# Patient Record
Sex: FEMALE | Race: WHITE | NOT HISPANIC OR LATINO
[De-identification: names, ages, dates, MRNs, and addresses within clinical notes are randomized per-mention and may not be internally consistent; named-entity substitution may affect disease eponyms.]

---

## 2018-12-03 ENCOUNTER — APPOINTMENT (OUTPATIENT)
Dept: INTERNAL MEDICINE | Facility: CLINIC | Age: 73
End: 2018-12-03
Payer: MEDICARE

## 2018-12-03 DIAGNOSIS — Z87.19 PERSONAL HISTORY OF OTHER DISEASES OF THE DIGESTIVE SYSTEM: ICD-10-CM

## 2018-12-03 DIAGNOSIS — Z87.39 PERSONAL HISTORY OF OTHER DISEASES OF THE MUSCULOSKELETAL SYSTEM AND CONNECTIVE TISSUE: ICD-10-CM

## 2018-12-03 DIAGNOSIS — Z80.3 FAMILY HISTORY OF MALIGNANT NEOPLASM OF BREAST: ICD-10-CM

## 2018-12-03 DIAGNOSIS — Z85.6 PERSONAL HISTORY OF LEUKEMIA: ICD-10-CM

## 2018-12-03 DIAGNOSIS — F31.70 BIPOLAR DISORDER, CURRENTLY IN REMISSION, MOST RECENT EPISODE UNSPECIFIED: ICD-10-CM

## 2018-12-03 PROCEDURE — 99204 OFFICE O/P NEW MOD 45 MIN: CPT

## 2018-12-03 NOTE — HEALTH RISK ASSESSMENT
[No falls in past year] : Patient reported no falls in the past year [0] : 2) Feeling down, depressed, or hopeless: Not at all (0) [] : Yes

## 2018-12-04 ENCOUNTER — RX RENEWAL (OUTPATIENT)
Age: 73
End: 2018-12-04

## 2018-12-17 ENCOUNTER — TRANSCRIPTION ENCOUNTER (OUTPATIENT)
Age: 73
End: 2018-12-17

## 2018-12-18 PROBLEM — F31.70 HISTORY OF DEPRESSED BIPOLAR DISORDER: Status: RESOLVED | Noted: 2018-12-03 | Resolved: 2018-12-18

## 2018-12-18 PROBLEM — Z87.19 HISTORY OF GASTROESOPHAGEAL REFLUX (GERD): Status: RESOLVED | Noted: 2018-12-03 | Resolved: 2018-12-18

## 2018-12-18 PROBLEM — Z80.3 FAMILY HISTORY OF MALIGNANT NEOPLASM OF FEMALE BREAST: Status: ACTIVE | Noted: 2018-12-03

## 2018-12-18 PROBLEM — Z87.19 HISTORY OF IRRITABLE BOWEL SYNDROME: Status: RESOLVED | Noted: 2018-12-03 | Resolved: 2018-12-18

## 2018-12-18 PROBLEM — Z85.6: Status: RESOLVED | Noted: 2018-12-03 | Resolved: 2018-12-18

## 2018-12-18 PROBLEM — Z87.39 HISTORY OF ARTHRITIS: Status: RESOLVED | Noted: 2018-12-03 | Resolved: 2018-12-18

## 2018-12-18 NOTE — PLAN
[FreeTextEntry1] : 1. spoke to patient for a long time. Ativan 4mg 4 times a day is not something i am willing to continue. She will have to find a psychiatrist for medication management. \par 2. cold virus , no intervention for now

## 2019-01-03 ENCOUNTER — RX RENEWAL (OUTPATIENT)
Age: 74
End: 2019-01-03

## 2019-01-03 RX ORDER — QUETIAPINE FUMARATE 100 MG/1
100 TABLET ORAL AT BEDTIME
Qty: 30 | Refills: 0 | Status: ACTIVE | COMMUNITY
Start: 2018-12-03 | End: 1900-01-01

## 2019-01-04 ENCOUNTER — RX RENEWAL (OUTPATIENT)
Age: 74
End: 2019-01-04

## 2019-01-04 ENCOUNTER — LABORATORY RESULT (OUTPATIENT)
Age: 74
End: 2019-01-04

## 2019-01-04 ENCOUNTER — APPOINTMENT (OUTPATIENT)
Dept: INTERNAL MEDICINE | Facility: CLINIC | Age: 74
End: 2019-01-04
Payer: MEDICARE

## 2019-01-04 ENCOUNTER — NON-APPOINTMENT (OUTPATIENT)
Age: 74
End: 2019-01-04

## 2019-01-04 VITALS
DIASTOLIC BLOOD PRESSURE: 72 MMHG | OXYGEN SATURATION: 98 % | BODY MASS INDEX: 8.64 KG/M2 | HEART RATE: 76 BPM | HEIGHT: 60 IN | SYSTOLIC BLOOD PRESSURE: 108 MMHG | WEIGHT: 44 LBS | TEMPERATURE: 98.2 F

## 2019-01-04 PROCEDURE — 93000 ELECTROCARDIOGRAM COMPLETE: CPT

## 2019-01-04 PROCEDURE — 36415 COLL VENOUS BLD VENIPUNCTURE: CPT

## 2019-01-04 PROCEDURE — G0439: CPT

## 2019-01-04 NOTE — HISTORY OF PRESENT ILLNESS
[FreeTextEntry1] : physical  [de-identified] : 1. patient is here for physical exam. \par 2 currently is  following a psychiatry and is on medication and getting therapy. \par 3. diagnosis of CLL and being monitored by hematology

## 2019-01-04 NOTE — HEALTH RISK ASSESSMENT
[Two or more falls in past year] : Patient reported two or more falls in the past year [0] : 2) Feeling down, depressed, or hopeless: Not at all (0) [Good] : ~his/her~  mood as  good [] : No [de-identified] : patient stopped smoking a 3 months ago [Change in mental status noted] : No change in mental status noted [Language] : denies difficulty with language [Behavior] : denies difficulty with behavior [Learning/Retaining New Information] : denies difficulty learning/retaining new information [Handling Complex Tasks] : denies difficulty handling complex tasks [Reasoning] : denies difficulty with reasoning [Spatial Ability and Orientation] : denies difficulty with spatial ability and orientation [Reports changes in hearing] : Reports no changes in hearing [Reports changes in vision] : Reports no changes in vision [Reports changes in dental health] : Reports no changes in dental health [Smoke Detector] : no smoke detector [Safety elements used in home] : no safety elements used in home [Seat Belt] : does not use seat belt [TB Exposure] : is not being exposed to tuberculosis [MammogramDate] : 2017 [PapSmearDate] : 2015 [BoneDensityDate] : 2015 [ColonoscopyDate] : many years

## 2019-01-08 LAB
25(OH)D3 SERPL-MCNC: 27.7 NG/ML
ALBUMIN SERPL ELPH-MCNC: 4.1 G/DL
ALP BLD-CCNC: 71 U/L
ALT SERPL-CCNC: 12 U/L
ANION GAP SERPL CALC-SCNC: 12 MMOL/L
AST SERPL-CCNC: 18 U/L
BASOPHILS # BLD AUTO: 0.18 K/UL
BASOPHILS NFR BLD AUTO: 0.2 %
BILIRUB SERPL-MCNC: 0.5 MG/DL
BUN SERPL-MCNC: 22 MG/DL
CALCIUM SERPL-MCNC: 9.1 MG/DL
CHLORIDE SERPL-SCNC: 105 MMOL/L
CHOLEST SERPL-MCNC: 248 MG/DL
CHOLEST/HDLC SERPL: 4.2 RATIO
CO2 SERPL-SCNC: 27 MMOL/L
CREAT SERPL-MCNC: 0.77 MG/DL
EOSINOPHIL # BLD AUTO: 0.29 K/UL
EOSINOPHIL NFR BLD AUTO: 0.4 %
GLUCOSE SERPL-MCNC: 77 MG/DL
HBA1C MFR BLD HPLC: 6.1 %
HCT VFR BLD CALC: 35.9 %
HCV AB SER QL: ABNORMAL
HCV S/CO RATIO: 1.4 S/CO
HDLC SERPL-MCNC: 59 MG/DL
HGB BLD-MCNC: 11.1 G/DL
HIV1+2 AB SPEC QL IA.RAPID: NONREACTIVE
IMM GRANULOCYTES NFR BLD AUTO: 0.1 %
LDLC SERPL CALC-MCNC: 160 MG/DL
LYMPHOCYTES # BLD AUTO: 77.47 K/UL
LYMPHOCYTES NFR BLD AUTO: 94.9 %
MAN DIFF?: NORMAL
MCHC RBC-ENTMCNC: 30.9 GM/DL
MCHC RBC-ENTMCNC: 31.6 PG
MCV RBC AUTO: 102.3 FL
MONOCYTES # BLD AUTO: 0.66 K/UL
MONOCYTES NFR BLD AUTO: 0.8 %
NEUTROPHILS # BLD AUTO: 2.91 K/UL
NEUTROPHILS NFR BLD AUTO: 3.6 %
PLATELET # BLD AUTO: 139 K/UL
POTASSIUM SERPL-SCNC: 4.2 MMOL/L
PROT SERPL-MCNC: 6.1 G/DL
RBC # BLD: 3.51 M/UL
RBC # FLD: 14.7 %
SODIUM SERPL-SCNC: 144 MMOL/L
T4 SERPL-MCNC: 6.7 UG/DL
TRIGL SERPL-MCNC: 145 MG/DL
TSH SERPL-ACNC: 7.16 UIU/ML
VIT B12 SERPL-MCNC: 443 PG/ML
WBC # FLD AUTO: 81.61 K/UL

## 2019-04-03 ENCOUNTER — TRANSCRIPTION ENCOUNTER (OUTPATIENT)
Age: 74
End: 2019-04-03

## 2019-04-10 ENCOUNTER — LABORATORY RESULT (OUTPATIENT)
Age: 74
End: 2019-04-10

## 2019-04-10 ENCOUNTER — APPOINTMENT (OUTPATIENT)
Dept: INTERNAL MEDICINE | Facility: CLINIC | Age: 74
End: 2019-04-10
Payer: MEDICARE

## 2019-04-10 VITALS
SYSTOLIC BLOOD PRESSURE: 120 MMHG | DIASTOLIC BLOOD PRESSURE: 80 MMHG | WEIGHT: 146 LBS | OXYGEN SATURATION: 98 % | HEIGHT: 64 IN | BODY MASS INDEX: 24.92 KG/M2 | HEART RATE: 76 BPM

## 2019-04-10 DIAGNOSIS — R35.0 FREQUENCY OF MICTURITION: ICD-10-CM

## 2019-04-10 DIAGNOSIS — R22.1 LOCALIZED SWELLING, MASS AND LUMP, NECK: ICD-10-CM

## 2019-04-10 PROCEDURE — 99214 OFFICE O/P EST MOD 30 MIN: CPT

## 2019-04-10 NOTE — PHYSICAL EXAM
[No Acute Distress] : no acute distress [Well Developed] : well developed [Well Nourished] : well nourished [Well-Appearing] : well-appearing [Normal Sclera/Conjunctiva] : normal sclera/conjunctiva [PERRL] : pupils equal round and reactive to light [EOMI] : extraocular movements intact [Normal Oropharynx] : the oropharynx was normal [Normal Outer Ear/Nose] : the outer ears and nose were normal in appearance [No Lymphadenopathy] : no lymphadenopathy [No JVD] : no jugular venous distention [Supple] : supple [Thyroid Normal, No Nodules] : the thyroid was normal and there were no nodules present [Clear to Auscultation] : lungs were clear to auscultation bilaterally [No Respiratory Distress] : no respiratory distress  [No Accessory Muscle Use] : no accessory muscle use [Normal Rate] : normal rate  [Regular Rhythm] : with a regular rhythm [Normal S1, S2] : normal S1 and S2 [No Murmur] : no murmur heard [No Carotid Bruits] : no carotid bruits [No Varicosities] : no varicosities [No Abdominal Bruit] : a ~M bruit was not heard ~T in the abdomen [Pedal Pulses Present] : the pedal pulses are present [No Edema] : there was no peripheral edema [No Extremity Clubbing/Cyanosis] : no extremity clubbing/cyanosis [Soft] : abdomen soft [No Palpable Aorta] : no palpable aorta [Non-distended] : non-distended [Non Tender] : non-tender [No Masses] : no abdominal mass palpated [Normal Bowel Sounds] : normal bowel sounds [No HSM] : no HSM [Normal Posterior Cervical Nodes] : no posterior cervical lymphadenopathy [Normal Anterior Cervical Nodes] : no anterior cervical lymphadenopathy [No CVA Tenderness] : no CVA  tenderness [No Spinal Tenderness] : no spinal tenderness [No Joint Swelling] : no joint swelling [No Rash] : no rash [Grossly Normal Strength/Tone] : grossly normal strength/tone [Normal Gait] : normal gait [Coordination Grossly Intact] : coordination grossly intact [No Focal Deficits] : no focal deficits [Deep Tendon Reflexes (DTR)] : deep tendon reflexes were 2+ and symmetric [Normal Affect] : the affect was normal [Normal Insight/Judgement] : insight and judgment were intact

## 2019-04-11 NOTE — HISTORY OF PRESENT ILLNESS
[FreeTextEntry8] : 1. patient reports she is always tired. She does not have the energy to do basic house work. \par 2. she would like to see a hematologist in the area for management of her CLL\par 3. She has frequent urination and would like to be evaluated by urology.

## 2019-04-15 LAB
APPEARANCE: CLEAR
BILIRUBIN URINE: NEGATIVE
BLOOD URINE: ABNORMAL
COLOR: YELLOW
GLUCOSE QUALITATIVE U: NEGATIVE
KETONES URINE: NEGATIVE
LEUKOCYTE ESTERASE URINE: ABNORMAL
NITRITE URINE: NEGATIVE
PH URINE: 5
PROTEIN URINE: NORMAL
SPECIFIC GRAVITY URINE: 1.02
UROBILINOGEN URINE: NORMAL

## 2019-04-19 ENCOUNTER — APPOINTMENT (OUTPATIENT)
Dept: HEMATOLOGY ONCOLOGY | Facility: CLINIC | Age: 74
End: 2019-04-19
Payer: MEDICARE

## 2019-05-06 ENCOUNTER — RESULT REVIEW (OUTPATIENT)
Age: 74
End: 2019-05-06

## 2019-05-06 ENCOUNTER — APPOINTMENT (OUTPATIENT)
Dept: HEMATOLOGY ONCOLOGY | Facility: CLINIC | Age: 74
End: 2019-05-06
Payer: MEDICARE

## 2019-05-06 VITALS
HEART RATE: 72 BPM | TEMPERATURE: 98.2 F | DIASTOLIC BLOOD PRESSURE: 74 MMHG | WEIGHT: 146 LBS | HEIGHT: 60.24 IN | RESPIRATION RATE: 20 BRPM | OXYGEN SATURATION: 97 % | SYSTOLIC BLOOD PRESSURE: 129 MMHG | BODY MASS INDEX: 28.29 KG/M2

## 2019-05-06 DIAGNOSIS — Z80.8 FAMILY HISTORY OF MALIGNANT NEOPLASM OF OTHER ORGANS OR SYSTEMS: ICD-10-CM

## 2019-05-06 DIAGNOSIS — M19.90 UNSPECIFIED OSTEOARTHRITIS, UNSPECIFIED SITE: ICD-10-CM

## 2019-05-06 PROCEDURE — 99205 OFFICE O/P NEW HI 60 MIN: CPT

## 2019-05-06 RX ORDER — CLONAZEPAM 1 MG/1
1 TABLET ORAL
Refills: 0 | Status: ACTIVE | COMMUNITY

## 2019-05-06 NOTE — ASSESSMENT
[FreeTextEntry1] : CLL \par Diagnosed in 2012\par Peripheral blood FISH showed 13q del\par Previously cared for by Dr Selina Carver ([facility])\par CT 1/2017 without any lymphadenopathy\par Mcintyre stage 0, low risk\par \par Co fatigue\par Has mild anemia Hgb 11.1, platelets 139 per labs 1/2019\par Repeat blood work including anemia panel\par DEXA scan given her reported ho osteoporosis\par \par Follow up in a few weeks to review findings\par May need imaging CT/PET \par NO repeat labs on follow up

## 2019-05-06 NOTE — HISTORY OF PRESENT ILLNESS
[de-identified] : 73 year old female presents today for CLL, referred by Dr. Jim.  \par \par Around 2012- She was not feeling well, weakness, fatigue, frequent infections- saw her PCP- who did blood work which showed elevated WBC- was referred to hematologist (Dr Selina Carver) - who diagnosed with CLL- she has been following with her since. Has intermittently had issues, which were not satisfactorily answered. \par She was last seen 4-5 years back. SHe saw Dr Monet another oncologist in the same practice in 2016 who did the FISH panel which showed del 13 q\par SHe decided to switch care and is here to establish care\par \par She continues to be tired. Has to sit after minimal activity\par She does not sleep very well - due to neuropathy - has had tingling numbness, burning bl feet x 3 years\par \par No fevers chills night sweats\par No fatigue, tiredness, apathy\par No appetite loss or weight loss/weight gain\par No chest pain, shortness of breath, palpitation, dizziness\par No abdominal pain, nausea, vomiting, diarrhea, constipation\par No bright red blood per rectum, hematemesis or melena\par No hematuria, dysuria, frequency, urgency\par No headaches, visual changes, focal weakness, tingling, numbness\par

## 2019-05-13 ENCOUNTER — RESULT REVIEW (OUTPATIENT)
Age: 74
End: 2019-05-13

## 2019-05-20 ENCOUNTER — APPOINTMENT (OUTPATIENT)
Dept: THORACIC SURGERY | Facility: CLINIC | Age: 74
End: 2019-05-20
Payer: MEDICARE

## 2019-05-20 PROCEDURE — 99205 OFFICE O/P NEW HI 60 MIN: CPT

## 2019-05-21 ENCOUNTER — APPOINTMENT (OUTPATIENT)
Dept: GASTROENTEROLOGY | Facility: CLINIC | Age: 74
End: 2019-05-21
Payer: MEDICARE

## 2019-05-21 VITALS
BODY MASS INDEX: 26.87 KG/M2 | DIASTOLIC BLOOD PRESSURE: 69 MMHG | SYSTOLIC BLOOD PRESSURE: 126 MMHG | HEIGHT: 62 IN | HEART RATE: 71 BPM | WEIGHT: 146 LBS

## 2019-05-21 PROCEDURE — 99203 OFFICE O/P NEW LOW 30 MIN: CPT

## 2019-05-21 RX ORDER — CLONAZEPAM 0.5 MG/1
0.5 TABLET ORAL
Qty: 60 | Refills: 0 | Status: DISCONTINUED | COMMUNITY
Start: 2018-12-04 | End: 2019-05-21

## 2019-05-21 RX ORDER — NITROFURANTOIN (MONOHYDRATE/MACROCRYSTALS) 25; 75 MG/1; MG/1
100 CAPSULE ORAL
Qty: 10 | Refills: 0 | Status: DISCONTINUED | COMMUNITY
Start: 2019-04-15 | End: 2019-05-21

## 2019-05-21 RX ORDER — QUETIAPINE 200 MG/1
200 TABLET, FILM COATED ORAL
Refills: 0 | Status: DISCONTINUED | COMMUNITY
End: 2019-05-21

## 2019-05-22 ENCOUNTER — RESULT REVIEW (OUTPATIENT)
Age: 74
End: 2019-05-22

## 2019-05-22 NOTE — HISTORY OF PRESENT ILLNESS
[FreeTextEntry1] : This is a 72 y/o F with pmhx of bipolar d/o, CLL, hiatal hernia (prior fundoplication) with GERD & Meza's Esophagitis who was referred to our office for surgical consultation for repair of recurrent hiatal hernia.  \par \par She states that she has had GERD symptoms for many years.  Her last endoscopy (approx 4 years ago) revealed recurrent hiatal hernia.  She states that she has taken many medications with very little symptomatic relief.  She sleeps with multiple pillows at night.  \par \par She admits to dpigastric pain, difficult and painful swallowing, choking on food at times.  She denies weight loss, nausea, vomiting.

## 2019-05-22 NOTE — PHYSICAL EXAM
[General Appearance - Alert] : alert [General Appearance - In No Acute Distress] : in no acute distress [Sclera] : the sclera and conjunctiva were normal [PERRL With Normal Accommodation] : pupils were equal in size, round, and reactive to light [Extraocular Movements] : extraocular movements were intact [Outer Ear] : the ears and nose were normal in appearance [Oropharynx] : the oropharynx was normal [Neck Appearance] : the appearance of the neck was normal [Neck Cervical Mass (___cm)] : no neck mass was observed [Jugular Venous Distention Increased] : there was no jugular-venous distention [Thyroid Diffuse Enlargement] : the thyroid was not enlarged [Thyroid Nodule] : there were no palpable thyroid nodules [Auscultation Breath Sounds / Voice Sounds] : lungs were clear to auscultation bilaterally [Heart Rate And Rhythm] : heart rate was normal and rhythm regular [Heart Sounds] : normal S1 and S2 [Heart Sounds Gallop] : no gallops [Murmurs] : no murmurs [Heart Sounds Pericardial Friction Rub] : no pericardial rub [Examination Of The Chest] : the chest was normal in appearance [Chest Visual Inspection Thoracic Asymmetry] : no chest asymmetry [Diminished Respiratory Excursion] : normal chest expansion [Bowel Sounds] : normal bowel sounds [Abdomen Soft] : soft [Abdomen Tenderness] : non-tender [Abdomen Mass (___ Cm)] : no abdominal mass palpated [Cervical Lymph Nodes Enlarged Posterior Bilaterally] : posterior cervical [Cervical Lymph Nodes Enlarged Anterior Bilaterally] : anterior cervical [Supraclavicular Lymph Nodes Enlarged Bilaterally] : supraclavicular [Axillary Lymph Nodes Enlarged Bilaterally] : axillary [Femoral Lymph Nodes Enlarged Bilaterally] : femoral [Inguinal Lymph Nodes Enlarged Bilaterally] : inguinal [No CVA Tenderness] : no ~M costovertebral angle tenderness [No Spinal Tenderness] : no spinal tenderness [Abnormal Walk] : normal gait [Nail Clubbing] : no clubbing  or cyanosis of the fingernails [Musculoskeletal - Swelling] : no joint swelling seen [Motor Tone] : muscle strength and tone were normal [Skin Color & Pigmentation] : normal skin color and pigmentation [Skin Turgor] : normal skin turgor [] : no rash [Deep Tendon Reflexes (DTR)] : deep tendon reflexes were 2+ and symmetric [Sensation] : the sensory exam was normal to light touch and pinprick [No Focal Deficits] : no focal deficits [Oriented To Time, Place, And Person] : oriented to person, place, and time [Impaired Insight] : insight and judgment were intact [Affect] : the affect was normal [FreeTextEntry1] : multiple 1 - 2 cm well healed scar from prior laparoscopic fundoplication

## 2019-05-22 NOTE — DATA REVIEWED
[No studies available for review at this time.] : No studies available for review at this time. [FreeTextEntry1] : N

## 2019-05-22 NOTE — ASSESSMENT
[FreeTextEntry1] : This is a 74 y/o F with hiatal hernia (s/p fundoplication in 2012) and refractory GERD symptoms.  She was told many years ago that her fundoplication had failed and she had a recurrent hiatal hernia.  Since then she has tried multiple medications without relief. She may a good candidate for robotic assisted reoperative hiatal hernia repair.  Prior to scheduling her for surgery we will need to obtain more accurate and up to date studies.  At this time she has been instructed to undergo CT chest, barium swallow and endoscopy to determine size of hiatal hernia.  She will return to our office with the results and we will reevaluate her for surgical repair.

## 2019-05-22 NOTE — CONSULT LETTER
[Dear  ___] : Dear  [unfilled], [Courtesy Letter:] : I had the pleasure of seeing your patient, [unfilled], in my office today. [Please see my note below.] : Please see my note below. [Consult Closing:] : Thank you very much for allowing me to participate in the care of this patient.  If you have any questions, please do not hesitate to contact me. [Sincerely,] : Sincerely, [FreeTextEntry3] : Mookie Monroy MD\par Thoracic Surgery\par [facility]

## 2019-05-22 NOTE — HISTORY OF PRESENT ILLNESS
[FreeTextEntry1] : this patient, in her first visit, has presented for several issues...\par 1.  mmain issue is gerd, patient has a history of a fundoplication operation,  laporoscopically, dr Roman, at [facility]., \par \par patient continued to have reflux...\par \par a mesh was placed..and a hernia was discovered at the time of the surgery..\par \par appar a ventral hernia.\par \par patient continued to need ppi treatment by dr Neal, GI at [facility], and was treated with several PPI drugs, including...nexium and one other \par they were unsuccessful...SHe continued to get regurgitation, and acid reflux,or heartburn along with the regurgitation.\par \par uses TUms, which helps..the most.\par \par it was recomm that the PPI meds be continued, but they seemed to have relativeluy little if any efect on the symptoms\par \par after the surgery, patient was later on seen at [facility].. and another surgical consult was advised..as a repair of the original surgery..\par \par patient decided against the surgery, partly because she might require an open procedure.\par \par but patient had already been dx with Leukemia [CLL], so it was decided not to do the Surgery.\par \par patient has continued to have heartburn almost every day or daily..\par It doesn’t seem to matter what she has to eat..\par \par has to avoid onions, red sauce, pepper, etc.\par there is also odynophagiqa\par \par even swallowing water can be painful..\par and for the last two or three years it is painful just to swallow food.\par \par sometimes there is dysphagia.\par \par for solids.\par \par episode of dysphagia swallowing a cracker.\par \par patient's previous md had treated patient with Nexium for a no of years, and antacids\par but she has not had endoscopy or EGD for about seven years, or six to seven years.\par \par patient has not been on therapy for her CLL\par \par the patient ihas sx of IBS..\par last colonoscopy over seven years ago\par \par she has altern constipation and diarrhea..\par and she gets bloated..\par \par patient has lost a considerable amount of weight voluntarily over the last four years, from 240 to 146 pounds.\par \par there is no cardio vascular disease disease, no exertional chest pain or angina\par \par not a smoker\par has five children three daughters and two sons\par \par no melena, no blood in bowlel movement

## 2019-05-22 NOTE — ASSESSMENT
[FreeTextEntry1] : patient has considerable ongoing reflux and regurgitation, occas dysphagia\par plan is to perform egd in assessing her for re operation\par \par More than 50% of the face to face time was devoted to counseling and /or coordination of care.  THis coordination of care may have included reviewing other medical notes and reports, and communicating with other health professionals\par

## 2019-05-22 NOTE — REVIEW OF SYSTEMS
[Abdominal Pain] : abdominal pain [Vomiting] : vomiting [Heartburn] : heartburn [Negative] : Psychiatric [Constipation] : no constipation [Diarrhea] : no diarrhea [Melena] : no melena

## 2019-05-23 ENCOUNTER — APPOINTMENT (OUTPATIENT)
Dept: GASTROENTEROLOGY | Facility: HOSPITAL | Age: 74
End: 2019-05-23

## 2019-05-28 ENCOUNTER — APPOINTMENT (OUTPATIENT)
Dept: HEMATOLOGY ONCOLOGY | Facility: CLINIC | Age: 74
End: 2019-05-28
Payer: MEDICARE

## 2019-05-28 VITALS
DIASTOLIC BLOOD PRESSURE: 75 MMHG | HEART RATE: 78 BPM | TEMPERATURE: 97.3 F | OXYGEN SATURATION: 99 % | HEIGHT: 62.01 IN | RESPIRATION RATE: 20 BRPM | SYSTOLIC BLOOD PRESSURE: 122 MMHG | WEIGHT: 148 LBS | BODY MASS INDEX: 26.89 KG/M2

## 2019-05-28 PROCEDURE — 99214 OFFICE O/P EST MOD 30 MIN: CPT

## 2019-05-28 NOTE — HISTORY OF PRESENT ILLNESS
[de-identified] : 73 year old female presents today for CLL, referred by Dr. Jim.  \par \par Around 2012- She was not feeling well, weakness, fatigue, frequent infections- saw her PCP- who did blood work which showed elevated WBC- was referred to hematologist (Dr Selina Carver) - who diagnosed with CLL- she has been following with her since. Has intermittently had issues, which were not satisfactorily answered. \par She was last seen 4-5 years back. SHe saw Dr Monet another oncologist in the same practice in 2016 who did the FISH panel which showed del 13 q\par SHe decided to switch care and is here to establish care\par \par She continues to be tired. Has to sit after minimal activity\par She does not sleep very well - due to neuropathy - has had tingling numbness, burning bl feet x 3 years\par \par No fevers chills night sweats\par No fatigue, tiredness, apathy\par No appetite loss or weight loss/weight gain\par No chest pain, shortness of breath, palpitation, dizziness\par No abdominal pain, nausea, vomiting, diarrhea, constipation\par No bright red blood per rectum, hematemesis or melena\par No hematuria, dysuria, frequency, urgency\par No headaches, visual changes, focal weakness, tingling, numbness\par   [de-identified] : She is seen today for follow up\par \par s/p IV injectafer on 5/21/19; next dose today\par \par She feels dramatic improvement in her symptoms\par She has more energy, is more awake\par \par She saw Dr Austen Haley (GI) -scheduled for CT, \par Had EGD (5/19/19)- FINAL ENDOSCOPIC DIAGNOSES:\par 1.  Erosive esophagitis with grade A erosions x2 in a patient with a previous fundoplication surgery but intractable symptoms of reflux and regurgitation and occasional dysphagia.\par 2.  Patchy granularity in the descending duodenum, of uncertain significance.

## 2019-05-28 NOTE — CONSULT LETTER
[Dear  ___] : Dear  [unfilled], [Please see my note below.] : Please see my note below. [Consult Closing:] : Thank you very much for allowing me to participate in the care of this patient.  If you have any questions, please do not hesitate to contact me. [Sincerely,] : Sincerely, [Courtesy Letter:] : I had the pleasure of seeing your patient, [unfilled], in my office today. [FreeTextEntry3] : Ron Bennett MD, MPH\par Attending Physician\par Hematology Oncology\par [facility]\par [facility]\par  [DrCamilo  ___] : Dr. KIMBROUGH

## 2019-05-28 NOTE — ASSESSMENT
[FreeTextEntry1] : CLL \par Diagnosed in 2012\par Peripheral blood FISH showed 13q del\par Previously cared for by Dr Selina Carver ([facility])\par CT 1/2017 without any lymphadenopathy\par Mcintyre stage 0, low risk\par \par Had fatigue and mild anemia Hgb 11.1\par Findings suggested ALEXIS- dramatic improvement in symptoms with IV iron\par Followed up with Dr Austen godinez and getting work up\par Close observation of counts for now\par \par DEXA scan shows osteopenia\par \par Follow up in 6 weeks. CBC, CMP, ferritin\par

## 2019-05-29 ENCOUNTER — RESULT REVIEW (OUTPATIENT)
Age: 74
End: 2019-05-29

## 2019-05-30 ENCOUNTER — RESULT REVIEW (OUTPATIENT)
Age: 74
End: 2019-05-30

## 2019-06-06 ENCOUNTER — RX CHANGE (OUTPATIENT)
Age: 74
End: 2019-06-06

## 2019-06-10 ENCOUNTER — APPOINTMENT (OUTPATIENT)
Dept: THORACIC SURGERY | Facility: CLINIC | Age: 74
End: 2019-06-10
Payer: MEDICARE

## 2019-06-16 ENCOUNTER — RX CHANGE (OUTPATIENT)
Age: 74
End: 2019-06-16

## 2019-06-19 ENCOUNTER — MEDICATION RENEWAL (OUTPATIENT)
Age: 74
End: 2019-06-19

## 2019-06-24 ENCOUNTER — RESULT REVIEW (OUTPATIENT)
Age: 74
End: 2019-06-24

## 2019-06-24 ENCOUNTER — APPOINTMENT (OUTPATIENT)
Dept: HEMATOLOGY ONCOLOGY | Facility: CLINIC | Age: 74
End: 2019-06-24
Payer: MEDICARE

## 2019-06-24 ENCOUNTER — APPOINTMENT (OUTPATIENT)
Dept: THORACIC SURGERY | Facility: CLINIC | Age: 74
End: 2019-06-24
Payer: MEDICARE

## 2019-06-24 VITALS
RESPIRATION RATE: 18 BRPM | BODY MASS INDEX: 26.17 KG/M2 | SYSTOLIC BLOOD PRESSURE: 132 MMHG | TEMPERATURE: 98.1 F | HEIGHT: 62.01 IN | OXYGEN SATURATION: 97 % | DIASTOLIC BLOOD PRESSURE: 81 MMHG | WEIGHT: 144 LBS | HEART RATE: 79 BPM

## 2019-06-24 DIAGNOSIS — B96.81 GASTRITIS, UNSPECIFIED, W/OUT BLEEDING: ICD-10-CM

## 2019-06-24 DIAGNOSIS — K29.70 GASTRITIS, UNSPECIFIED, W/OUT BLEEDING: ICD-10-CM

## 2019-06-24 PROCEDURE — 99214 OFFICE O/P EST MOD 30 MIN: CPT

## 2019-06-24 PROCEDURE — 99215 OFFICE O/P EST HI 40 MIN: CPT

## 2019-06-24 NOTE — PHYSICAL EXAM
[Sclera] : the sclera and conjunctiva were normal [PERRL With Normal Accommodation] : pupils were equal in size, round, and reactive to light [Extraocular Movements] : extraocular movements were intact [Neck Cervical Mass (___cm)] : no neck mass was observed [Neck Appearance] : the appearance of the neck was normal [Thyroid Nodule] : there were no palpable thyroid nodules [Jugular Venous Distention Increased] : there was no jugular-venous distention [Thyroid Diffuse Enlargement] : the thyroid was not enlarged [Heart Rate And Rhythm] : heart rate was normal and rhythm regular [Auscultation Breath Sounds / Voice Sounds] : lungs were clear to auscultation bilaterally [Heart Sounds Gallop] : no gallops [Heart Sounds] : normal S1 and S2 [Murmurs] : no murmurs [Heart Sounds Pericardial Friction Rub] : no pericardial rub [Examination Of The Chest] : the chest was normal in appearance [Chest Visual Inspection Thoracic Asymmetry] : no chest asymmetry [Diminished Respiratory Excursion] : normal chest expansion [Bowel Sounds] : normal bowel sounds [Abdomen Soft] : soft [Abdomen Tenderness] : non-tender [Abdomen Mass (___ Cm)] : no abdominal mass palpated [Cervical Lymph Nodes Enlarged Posterior Bilaterally] : posterior cervical [Cervical Lymph Nodes Enlarged Anterior Bilaterally] : anterior cervical [Axillary Lymph Nodes Enlarged Bilaterally] : axillary [Femoral Lymph Nodes Enlarged Bilaterally] : femoral [Supraclavicular Lymph Nodes Enlarged Bilaterally] : supraclavicular [No Spinal Tenderness] : no spinal tenderness [Inguinal Lymph Nodes Enlarged Bilaterally] : inguinal [No CVA Tenderness] : no ~M costovertebral angle tenderness [Nail Clubbing] : no clubbing  or cyanosis of the fingernails [Abnormal Walk] : normal gait [Musculoskeletal - Swelling] : no joint swelling seen [Motor Tone] : muscle strength and tone were normal [Skin Color & Pigmentation] : normal skin color and pigmentation [] : no rash [Skin Turgor] : normal skin turgor [Sensation] : the sensory exam was normal to light touch and pinprick [Deep Tendon Reflexes (DTR)] : deep tendon reflexes were 2+ and symmetric [No Focal Deficits] : no focal deficits [Oriented To Time, Place, And Person] : oriented to person, place, and time [Impaired Insight] : insight and judgment were intact [Affect] : the affect was normal

## 2019-06-26 NOTE — ASSESSMENT
[FreeTextEntry1] : This is a 74 y/o F with hiatal hernia (s/p fundoplication in 2012) and refractory GERD symptoms who initially presented to our office 1 month ago for evaluation of her recurrent hiatal hernia.   At that time she was instructed to undergo CT chest, barium swallow and endoscopy to determine size of hiatal hernia. Results of this imaging demonstrated a small paraesophageal hernia & moderate GERD.  She is continuing to follow with her gastroenterologist and has been started on Dexilant. She states that this medication has helped significantly. \par \par GIven the fact that she has improved with medication, it would be appropriate to continue to monitor her.  She has multiple comorbidites and this would be a higher risk operation given her prior history of hernia repair.  \par \par She has been instructed to continue to follow with our office as needed.

## 2019-06-26 NOTE — DATA REVIEWED
[FreeTextEntry1] : Esophagogastroduodenoscopy OR Path 5/24/19:  Duodenum- duodenal-type follicular lymphoma\par Stomach - focal intestinal metaplasia & (=) H. pylori associated chronic active gastritis with prominent reactive lymphoid aggregates\par Esophagus - esophageal squamous mucosa showing hyperplastic changes\par \par Esophagogastroduodenoscopy Endoscopic Diagnosis:  erosive esophagitis with Grade A erosions x 2 in a patient with a previous fundoplication surgery but intractable symptoms of reflux and regurgiation and occasional dyspnea.  Patchy granularity in the descending duodenum. \par \par XR Esophagram: 5/30/19:  Intermittent tertiary contraction s in lower esophagus.  Hiatal hernia measuring 5 cm in width.  Multiple episodes of moderate spontaneous GERD observed. \par \par CT Chest 5/29/19:  Small paraesophageal hernia.  No lung mass, infiltrate or suspicious pulmonary nodules noted.  2 cm bullae present anteriorly in the RUL

## 2019-06-26 NOTE — CONSULT LETTER
[Dear  ___] : Dear  [unfilled], [Please see my note below.] : Please see my note below. [Courtesy Letter:] : I had the pleasure of seeing your patient, [unfilled], in my office today. [Sincerely,] : Sincerely, [Consult Closing:] : Thank you very much for allowing me to participate in the care of this patient.  If you have any questions, please do not hesitate to contact me. [DrCamilo  ___] : Dr. KIMBROUGH [DrCamilo ___] : Dr. KIMBROUGH [FreeTextEntry3] : Mookie Monroy MD\par Thoracic Surgery\par [facility]

## 2019-07-05 ENCOUNTER — APPOINTMENT (OUTPATIENT)
Dept: UROLOGY | Facility: CLINIC | Age: 74
End: 2019-07-05

## 2019-07-05 ENCOUNTER — APPOINTMENT (OUTPATIENT)
Dept: HEMATOLOGY ONCOLOGY | Facility: CLINIC | Age: 74
End: 2019-07-05
Payer: MEDICARE

## 2019-07-08 ENCOUNTER — APPOINTMENT (OUTPATIENT)
Dept: GASTROENTEROLOGY | Facility: CLINIC | Age: 74
End: 2019-07-08

## 2019-07-30 ENCOUNTER — RX CHANGE (OUTPATIENT)
Age: 74
End: 2019-07-30

## 2019-08-03 ENCOUNTER — RX RENEWAL (OUTPATIENT)
Age: 74
End: 2019-08-03

## 2019-08-03 DIAGNOSIS — K44.9 DIAPHRAGMATIC HERNIA W/OUT OBSTRUCTION OR GANGRENE: ICD-10-CM

## 2019-08-20 ENCOUNTER — APPOINTMENT (OUTPATIENT)
Dept: OBGYN | Facility: CLINIC | Age: 74
End: 2019-08-20

## 2019-08-20 ENCOUNTER — APPOINTMENT (OUTPATIENT)
Dept: GASTROENTEROLOGY | Facility: CLINIC | Age: 74
End: 2019-08-20
Payer: MEDICARE

## 2019-08-20 VITALS
HEIGHT: 62 IN | WEIGHT: 145 LBS | DIASTOLIC BLOOD PRESSURE: 60 MMHG | HEART RATE: 76 BPM | BODY MASS INDEX: 26.68 KG/M2 | SYSTOLIC BLOOD PRESSURE: 110 MMHG

## 2019-08-20 PROCEDURE — 99214 OFFICE O/P EST MOD 30 MIN: CPT

## 2019-08-20 RX ORDER — AMOXICILLIN 500 MG/1
500 TABLET, FILM COATED ORAL TWICE DAILY
Qty: 56 | Refills: 1 | Status: DISCONTINUED | COMMUNITY
Start: 2019-06-06 | End: 2019-08-20

## 2019-08-20 RX ORDER — TETRACYCLINE HYDROCHLORIDE 500 MG/1
500 CAPSULE ORAL EVERY 6 HOURS
Qty: 56 | Refills: 1 | Status: DISCONTINUED | COMMUNITY
Start: 2019-06-16 | End: 2019-08-20

## 2019-08-20 RX ORDER — OMEPRAZOLE 20 MG/1
20 CAPSULE, DELAYED RELEASE ORAL TWICE DAILY
Qty: 180 | Refills: 3 | Status: DISCONTINUED | COMMUNITY
Start: 2019-08-03 | End: 2019-08-20

## 2019-08-20 RX ORDER — OMEPRAZOLE 20 MG/1
20 CAPSULE, DELAYED RELEASE ORAL EVERY 8 HOURS
Qty: 90 | Refills: 5 | Status: DISCONTINUED | COMMUNITY
Start: 2019-07-30 | End: 2019-08-20

## 2019-08-20 RX ORDER — CLARITHROMYCIN 500 MG/1
500 TABLET, FILM COATED ORAL TWICE DAILY
Qty: 28 | Refills: 1 | Status: DISCONTINUED | COMMUNITY
Start: 2019-06-06 | End: 2019-08-20

## 2019-08-20 RX ORDER — CLONAZEPAM 0.5 MG/1
0.5 TABLET ORAL TWICE DAILY
Qty: 120 | Refills: 0 | Status: DISCONTINUED | COMMUNITY
Start: 2018-12-07 | End: 2019-08-20

## 2019-08-20 RX ORDER — METRONIDAZOLE 250 MG/1
250 TABLET ORAL EVERY 6 HOURS
Qty: 56 | Refills: 1 | Status: DISCONTINUED | COMMUNITY
Start: 2019-06-16 | End: 2019-08-20

## 2019-08-20 RX ORDER — CLONAZEPAM 1 MG/1
1 TABLET ORAL TWICE DAILY
Qty: 60 | Refills: 0 | Status: DISCONTINUED | COMMUNITY
Start: 2018-12-03 | End: 2019-08-20

## 2019-08-20 NOTE — HISTORY OF PRESENT ILLNESS
[FreeTextEntry1] : 1.  patient is here post treatment of her h pylori, with combo therapy including\par a.  tetracycline\par b.  peptobismol\par c.  flagyl\par d.  bid omeprazole\par \par all for a total of fourteen days\par she tolerated the therapy well.\par \par meanwhile, in addition to the h pylori gastritis, this patient, who is under treatment and evaluation for her CLL, was found in addition to have Follicular Lymphoma of the descending duodenum\par \par THis was a most unusual circumstance;  I observed two dime sized patches of some increased granularity, wasn’t sure of the etiology, biopsied both, and somewhat to my surprise, the biopsies came back 'follicular lymphoma'\par \par 3.  meanwhile the patient, who originally was being evaluated by Dr Monroy for possible reop of Hiatal Hernia repair and anti reflux surgery..has continued to experience frequent but not as frequent heartburn\par \par she avoids spicy foods, greasy foods, to limit her symptoms\par \par there is also, still rather frequent dysphagia, with hesitation as she swallows, even liquids, but solids seeming to hesitate in throat, but then a sense that the food hesitates in lower esophagus, so she needs to swallow more liquids  to get the food into stomach.\par \par finally, there has not been a colonoscopy for ten or more years..

## 2019-08-20 NOTE — REASON FOR VISIT
[Follow-Up: _____] : a [unfilled] follow-up visit [FreeTextEntry1] : h pylori gastritis, intractable esophageal reflux, duodenal follicular lymphoma

## 2019-08-20 NOTE — ASSESSMENT
[FreeTextEntry1] : 1.We will plan and schedule colonoscopy which most certainly is due as it has not been done for 10 years\par \par 2.  EGD is also being repeatedfirst to see if there is anythingI can do a balloon on since the patient is clinically improved but still suffers from hesitation of the food-just possibly I can balloon opened the GE junction even if the impingement of the lumen is rather modest\par \par 3. Finally I would very much like to look at the descending duodenum again; partly to see if the patches of follicular lymphoma have improved in any way now that we've treated the H. pylori; I know this is a bit of a stretch but we have seen this so frequently with MALT lymphoma of stomach, and I would like to see how this affects the duodenal abnormalities, espec since they were visible.\par \par 4.  also, I need to do more random duodenal biopsies, just to make sure there are no diffuse changes of Lymphoma.\par \par 5.  it is possible that these abnl if still present, could be treated with Laser \par \par finally, we should be prepared if needed to do a Capsule endoscopy\par \par AS WE OBTAIN INFORMED CONSENT FOR COLONOSCOPY, UPPER ENDOSCOPY [EGD], OR BOTH PROCEDURES TOGETHER;\par \par As with all procedures, there are risks of which the patient should be aware\par \par 1.  Anesthesia; deep sedation with Propofol;  there is a small risk of aspiration and pulmonary infection.  The Anesthesiologist meets with the patient the morning of the procedure to discuss in more detail\par \par 2.  risk of bleeding; from removal of a polyp, or rarely, from biopsies, 1 % or less\par \par 3.  risk of injury or perforation of the colon or upper GI tract; one in a thousand or less,  from removing a polyp or from advancing the instrument\par \par \par More than 50% of the face to face time was devoted to counseling and /or coordination of care.  THis coordination of care may have included reviewing other medical notes and reports, and communicating with other health professionals\par \par \par \par Addendum; \par I have spoken to Dr Bennett, who called back from a conference in [place]...\par We are going to hold off on the EGD for the time being, since it is possible that after a couple months post treatment for H pylori, there might be a greater chance of improvement.\par also, dr Bennett feels ben this follicular lymphoma might end up as a very benign issue that does not change the whole picture.\par \par therefore, I will have Nancy make a fu visit to Dr Rivera to discuss having a Capsule Endoscopy

## 2019-08-24 ENCOUNTER — APPOINTMENT (OUTPATIENT)
Dept: GASTROENTEROLOGY | Facility: HOSPITAL | Age: 74
End: 2019-08-24

## 2019-08-28 ENCOUNTER — RESULT REVIEW (OUTPATIENT)
Age: 74
End: 2019-08-28

## 2019-08-28 ENCOUNTER — APPOINTMENT (OUTPATIENT)
Dept: HEMATOLOGY ONCOLOGY | Facility: CLINIC | Age: 74
End: 2019-08-28
Payer: MEDICARE

## 2019-08-28 VITALS
OXYGEN SATURATION: 96 % | WEIGHT: 142 LBS | RESPIRATION RATE: 18 BRPM | TEMPERATURE: 99 F | DIASTOLIC BLOOD PRESSURE: 69 MMHG | HEIGHT: 62.01 IN | HEART RATE: 85 BPM | BODY MASS INDEX: 25.8 KG/M2 | SYSTOLIC BLOOD PRESSURE: 120 MMHG

## 2019-08-28 PROCEDURE — 99215 OFFICE O/P EST HI 40 MIN: CPT

## 2019-08-28 NOTE — ASSESSMENT
[FreeTextEntry1] : CLL \par Diagnosed in 2012\par Peripheral blood FISH showed 13q del\par Previously cared for by Dr Selina Carver ([facility])\par CT 1/2017 without any lymphadenopathy\par Mcintyre stage 0, low risk\par \par Overall feels good\par Fatigue better\par Labs show stable Hgb, better WBC\par However platelets continue to be less than 100K\par No major bleeding or bruising\par Will monitor closely for now\par \par Duodenal type follicular lymphoma\par Incidental finding on EGD\par PET/CT (7/1/19) - Left retropharyngeal Lymph node (SUV 6.7)\par Unlikely causing any symptoms\par Monitor for now\par Can reasses with EGD in a few months\par ??? May resolve with H Pylori therapy\par \par DEXA scan shows osteopenia\par \par Follow up in 3 months. CBC, CMP, ferritin, peripheral blood FISH for CLL and Rowe code BCLL\par

## 2019-08-28 NOTE — CONSULT LETTER
[Dear  ___] : Dear  [unfilled], [Courtesy Letter:] : I had the pleasure of seeing your patient, [unfilled], in my office today. [Please see my note below.] : Please see my note below. [Consult Closing:] : Thank you very much for allowing me to participate in the care of this patient.  If you have any questions, please do not hesitate to contact me. [Sincerely,] : Sincerely, [DrCamilo  ___] : Dr. KIMBROUGH [FreeTextEntry3] : Ron Bennett MD, MPH\par Attending Physician\par Hematology Oncology\par [facility] [place]\par [facility]\par

## 2019-08-28 NOTE — HISTORY OF PRESENT ILLNESS
[de-identified] : 73 year old female presents today for CLL, referred by Dr. Jim.  \par \par Around 2012- She was not feeling well, weakness, fatigue, frequent infections- saw her PCP- who did blood work which showed elevated WBC- was referred to hematologist (Dr Selina Carver) - who diagnosed with CLL- she has been following with her since. Has intermittently had issues, which were not satisfactorily answered. \par She was last seen 4-5 years back. SHe saw Dr Monet another oncologist in the same practice in 2016 who did the FISH panel which showed del 13 q\par SHe decided to switch care and is here to establish care\par \par She continues to be tired. Has to sit after minimal activity\par She does not sleep very well - due to neuropathy - has had tingling numbness, burning bl feet x 3 years\par \par No fevers chills night sweats\par No fatigue, tiredness, apathy\par No appetite loss or weight loss/weight gain\par No chest pain, shortness of breath, palpitation, dizziness\par No abdominal pain, nausea, vomiting, diarrhea, constipation\par No bright red blood per rectum, hematemesis or melena\par No hematuria, dysuria, frequency, urgency\par No headaches, visual changes, focal weakness, tingling, numbness\par  \par s/p IV injectafer on 5/21/19; next dose today\par \par She feels dramatic improvement in her symptoms\par She has more energy, is more awake\par \par She saw Dr Austen Haley (GI) -scheduled for CT, \par Had EGD (5/19/19)- FINAL ENDOSCOPIC DIAGNOSES:\par 1.  Erosive esophagitis with grade A erosions x2 in a patient with a previous fundoplication surgery but intractable symptoms of reflux and regurgitation and occasional dysphagia.\par 2.  Patchy granularity in the descending duodenum, of uncertain significance. [de-identified] : She is seen today for follow up\par Accompanied by her daughter\par \par She completed triple therapy for Hpylori since \par Her energy levels continue to be good\par No obvious bleeding or bruising. Except for a bruise under the right arm which she incidentally noted today\par No B symtpoms

## 2019-09-05 PROBLEM — K29.70 HELICOBACTER PYLORI GASTRITIS: Status: ACTIVE | Noted: 2019-06-06

## 2019-09-05 NOTE — CONSULT LETTER
[Dear  ___] : Dear  [unfilled], [Please see my note below.] : Please see my note below. [Courtesy Letter:] : I had the pleasure of seeing your patient, [unfilled], in my office today. [Consult Closing:] : Thank you very much for allowing me to participate in the care of this patient.  If you have any questions, please do not hesitate to contact me. [Sincerely,] : Sincerely, [DrCamilo  ___] : Dr. KIMBROUGH [FreeTextEntry3] : Ron Bennett MD, MPH\par Attending Physician\par Hematology Oncology\par [facility] [place]\par [facility]\par

## 2019-09-05 NOTE — HISTORY OF PRESENT ILLNESS
[de-identified] : 73 year old female presents today for CLL, referred by Dr. Jim.  \par \par Around 2012- She was not feeling well, weakness, fatigue, frequent infections- saw her PCP- who did blood work which showed elevated WBC- was referred to hematologist (Dr Selina Carver) - who diagnosed with CLL- she has been following with her since. Has intermittently had issues, which were not satisfactorily answered. \par She was last seen 4-5 years back. SHe saw Dr Monet another oncologist in the same practice in 2016 who did the FISH panel which showed del 13 q\par SHe decided to switch care and is here to establish care\par \par She continues to be tired. Has to sit after minimal activity\par She does not sleep very well - due to neuropathy - has had tingling numbness, burning bl feet x 3 years\par \par No fevers chills night sweats\par No fatigue, tiredness, apathy\par No appetite loss or weight loss/weight gain\par No chest pain, shortness of breath, palpitation, dizziness\par No abdominal pain, nausea, vomiting, diarrhea, constipation\par No bright red blood per rectum, hematemesis or melena\par No hematuria, dysuria, frequency, urgency\par No headaches, visual changes, focal weakness, tingling, numbness\par  \par s/p IV injectafer on 5/21/19; next dose today\par \par She feels dramatic improvement in her symptoms\par She has more energy, is more awake\par \par She saw Dr Austen Haley (GI) -scheduled for CT, \par Had EGD (5/19/19)- FINAL ENDOSCOPIC DIAGNOSES:\par 1.  Erosive esophagitis with grade A erosions x2 in a patient with a previous fundoplication surgery but intractable symptoms of reflux and regurgitation and occasional dysphagia.\par 2.  Patchy granularity in the descending duodenum, of uncertain significance. [de-identified] : SHe is seen today for follow up\par On triple therapy for Hpylori since 6/18/19-present\par Her energy levels are better but has different symptoms from triple therapy\par \par No fevers, chills, night sweats\par

## 2019-09-05 NOTE — ASSESSMENT
[FreeTextEntry1] : Follicular lymphoma- duodenal type\par Discussed with patient that this is an incidental finding\par Majority of times this is a indolent variety\par Schedule PET/CT \par \par CLL \par Diagnosed in 2012\par Peripheral blood FISH showed 13q del\par Previously cared for by Dr Selina Carver ([facility])\par CT 1/2017 without any lymphadenopathy\par Mcintyre stage 0, low risk\par \par Had fatigue and mild anemia Hgb 11.1\par Findings suggested ALEXIS- dramatic improvement in symptoms with IV iron\par Followed up with Dr Austen godinez and getting work up\par Close observation of counts for now\par \par DEXA scan shows osteopenia\par \par Follow up in 8-12 weeks. CBC, CMP, ferritin\par

## 2019-09-23 ENCOUNTER — APPOINTMENT (OUTPATIENT)
Dept: INTERNAL MEDICINE | Facility: CLINIC | Age: 74
End: 2019-09-23
Payer: MEDICARE

## 2019-09-23 VITALS
DIASTOLIC BLOOD PRESSURE: 78 MMHG | HEART RATE: 82 BPM | WEIGHT: 146 LBS | BODY MASS INDEX: 26.87 KG/M2 | SYSTOLIC BLOOD PRESSURE: 124 MMHG | OXYGEN SATURATION: 98 % | TEMPERATURE: 97.1 F | HEIGHT: 62 IN

## 2019-09-23 DIAGNOSIS — R32 UNSPECIFIED URINARY INCONTINENCE: ICD-10-CM

## 2019-09-23 PROCEDURE — 99213 OFFICE O/P EST LOW 20 MIN: CPT | Mod: 25

## 2019-09-23 PROCEDURE — 81002 URINALYSIS NONAUTO W/O SCOPE: CPT

## 2019-09-23 RX ORDER — QUETIAPINE FUMARATE 25 MG/1
25 TABLET ORAL ONCE
Qty: 30 | Refills: 0 | Status: COMPLETED | COMMUNITY
Start: 2018-12-03 | End: 2019-09-23

## 2019-09-23 RX ORDER — DOXYCYCLINE HYCLATE 100 MG/1
100 CAPSULE ORAL TWICE DAILY
Qty: 28 | Refills: 0 | Status: COMPLETED | COMMUNITY
Start: 2019-06-19 | End: 2019-09-23

## 2019-09-24 LAB
APPEARANCE: CLEAR
BACTERIA: NEGATIVE
BILIRUB UR QL STRIP: NEGATIVE
BILIRUBIN URINE: NEGATIVE
BLOOD URINE: NEGATIVE
CLARITY UR: CLEAR
COLLECTION METHOD: NORMAL
COLOR: NORMAL
GLUCOSE QUALITATIVE U: NEGATIVE
GLUCOSE UR-MCNC: NEGATIVE
HCG UR QL: 0.2 EU/DL
HGB UR QL STRIP.AUTO: NORMAL
HYALINE CASTS: 0 /LPF
KETONES UR-MCNC: NEGATIVE
KETONES URINE: NEGATIVE
LEUKOCYTE ESTERASE UR QL STRIP: NEGATIVE
LEUKOCYTE ESTERASE URINE: NEGATIVE
MICROSCOPIC-UA: NORMAL
NITRITE UR QL STRIP: NEGATIVE
NITRITE URINE: NEGATIVE
PH UR STRIP: 5.5
PH URINE: 5.5
PROT UR STRIP-MCNC: NEGATIVE
PROTEIN URINE: NEGATIVE
RED BLOOD CELLS URINE: 4 /HPF
SP GR UR STRIP: 1
SPECIFIC GRAVITY URINE: 1.01
SQUAMOUS EPITHELIAL CELLS: 0 /HPF
UROBILINOGEN URINE: NORMAL
WHITE BLOOD CELLS URINE: 0 /HPF

## 2019-09-25 ENCOUNTER — APPOINTMENT (OUTPATIENT)
Dept: GASTROENTEROLOGY | Facility: HOSPITAL | Age: 74
End: 2019-09-25

## 2019-09-25 LAB — BACTERIA UR CULT: NORMAL

## 2019-10-21 ENCOUNTER — APPOINTMENT (OUTPATIENT)
Dept: GASTROENTEROLOGY | Facility: CLINIC | Age: 74
End: 2019-10-21

## 2019-10-30 ENCOUNTER — APPOINTMENT (OUTPATIENT)
Dept: OBGYN | Facility: CLINIC | Age: 74
End: 2019-10-30

## 2019-12-02 ENCOUNTER — APPOINTMENT (OUTPATIENT)
Dept: INTERNAL MEDICINE | Facility: CLINIC | Age: 74
End: 2019-12-02

## 2019-12-10 ENCOUNTER — APPOINTMENT (OUTPATIENT)
Dept: HEMATOLOGY ONCOLOGY | Facility: CLINIC | Age: 74
End: 2019-12-10

## 2019-12-13 ENCOUNTER — RESULT REVIEW (OUTPATIENT)
Age: 74
End: 2019-12-13

## 2019-12-13 ENCOUNTER — APPOINTMENT (OUTPATIENT)
Dept: HEMATOLOGY ONCOLOGY | Facility: CLINIC | Age: 74
End: 2019-12-13
Payer: MEDICARE

## 2019-12-13 ENCOUNTER — APPOINTMENT (OUTPATIENT)
Dept: FAMILY MEDICINE | Facility: CLINIC | Age: 74
End: 2019-12-13

## 2019-12-13 VITALS
RESPIRATION RATE: 16 BRPM | SYSTOLIC BLOOD PRESSURE: 107 MMHG | DIASTOLIC BLOOD PRESSURE: 68 MMHG | TEMPERATURE: 98 F | HEIGHT: 62.01 IN | OXYGEN SATURATION: 98 % | BODY MASS INDEX: 27.07 KG/M2 | HEART RATE: 80 BPM | WEIGHT: 149 LBS

## 2019-12-13 PROCEDURE — 99215 OFFICE O/P EST HI 40 MIN: CPT

## 2019-12-13 NOTE — HISTORY OF PRESENT ILLNESS
[de-identified] : 73 year old female presents today for CLL, referred by Dr. Jim.  \par \par Around 2012- She was not feeling well, weakness, fatigue, frequent infections- saw her PCP- who did blood work which showed elevated WBC- was referred to hematologist (Dr Selina Carver) - who diagnosed with CLL- she has been following with her since. Has intermittently had issues, which were not satisfactorily answered. \par She was last seen 4-5 years back. SHe saw Dr Monet another oncologist in the same practice in 2016 who did the FISH panel which showed del 13 q\par SHe decided to switch care and is here to establish care\par \par She continues to be tired. Has to sit after minimal activity\par She does not sleep very well - due to neuropathy - has had tingling numbness, burning bl feet x 3 years\par \par No fevers chills night sweats\par No fatigue, tiredness, apathy\par No appetite loss or weight loss/weight gain\par No chest pain, shortness of breath, palpitation, dizziness\par No abdominal pain, nausea, vomiting, diarrhea, constipation\par No bright red blood per rectum, hematemesis or melena\par No hematuria, dysuria, frequency, urgency\par No headaches, visual changes, focal weakness, tingling, numbness\par  \par s/p IV injectafer on 5/21/19; next dose today\par \par She feels dramatic improvement in her symptoms\par She has more energy, is more awake\par \par She saw Dr Austen Haley (GI) -scheduled for CT, \par Had EGD (5/19/19)- FINAL ENDOSCOPIC DIAGNOSES:\par 1.  Erosive esophagitis with grade A erosions x2 in a patient with a previous fundoplication surgery but intractable symptoms of reflux and regurgitation and occasional dysphagia.\par 2.  Patchy granularity in the descending duodenum, of uncertain significance. [de-identified] : She is seen today for follow up\par \par She had tick bite x 3 in November- went to urgent care- required antibiotics\par \par Has bl feet numbness,burning pain, which is chronic (going on for years)\par Also get cramps UE and LE\par \par Also has GERD- is going to follow up with Dr Haley\par \par Her energy levels continue to be good\par No B symtpoms

## 2019-12-13 NOTE — ASSESSMENT
[FreeTextEntry1] : CLL \par Diagnosed in 2012\par Peripheral blood FISH showed 13q del\par Previously cared for by Dr Selina Carver ([facility])\par CT 1/2017 without any lymphadenopathy\par Mcintyre stage 0, low risk\par \par Overall feels good\par Fatigue better\par Labs show rising WBC, stable Hgb, platelets\par Follow up peripheral blood FISH for CLL and Rowe code BCLL from today\par However platelets continue to be less than 100K\par Close monitoring\par \par Duodenal type follicular lymphoma\par Incidental finding on EGD\par PET/CT (7/1/19) - Left retropharyngeal Lymph node (SUV 6.7)\par Unlikely causing any symptoms\par Monitor for now\par Follwo up with Dr Cordova for repeat EGD \par \par DEXA scan shows osteopenia\par \par Follow up in 3 months. CBC, CMP, ferritin, \par

## 2019-12-13 NOTE — CONSULT LETTER
[Dear  ___] : Dear  [unfilled], [Please see my note below.] : Please see my note below. [Courtesy Letter:] : I had the pleasure of seeing your patient, [unfilled], in my office today. [Consult Closing:] : Thank you very much for allowing me to participate in the care of this patient.  If you have any questions, please do not hesitate to contact me. [Sincerely,] : Sincerely, [DrCamilo  ___] : Dr. KIMBROUGH [FreeTextEntry3] : Ron Bennett MD, MPH\par Attending Physician\par Hematology Oncology\par [facility] [place]\par [facility]\par

## 2019-12-20 ENCOUNTER — APPOINTMENT (OUTPATIENT)
Dept: NEUROLOGY | Facility: CLINIC | Age: 74
End: 2019-12-20
Payer: MEDICARE

## 2019-12-20 VITALS
HEIGHT: 60 IN | OXYGEN SATURATION: 99 % | HEART RATE: 78 BPM | BODY MASS INDEX: 29.25 KG/M2 | TEMPERATURE: 97.2 F | WEIGHT: 149 LBS | DIASTOLIC BLOOD PRESSURE: 64 MMHG | SYSTOLIC BLOOD PRESSURE: 117 MMHG

## 2019-12-20 DIAGNOSIS — R29.810 FACIAL WEAKNESS: ICD-10-CM

## 2019-12-20 PROCEDURE — 99205 OFFICE O/P NEW HI 60 MIN: CPT

## 2019-12-20 NOTE — REASON FOR VISIT
[Consultation] : a consultation visit [FreeTextEntry1] : PT has been expericencing numbess and cold feet. Tight pressure on ankles. Sensatoions of tingling and feels like pins and needles.

## 2019-12-20 NOTE — HISTORY OF PRESENT ILLNESS
[FreeTextEntry1] : This is a pleasant 74-year-old right-handed woman who is being seen in neurologic consultation for evaluation of numbness and pain in her feet. Patient states that the numbness and tingling has been present for greater than a year but of late has been getting very intense. She describes constantly having cold feet. There is intense tingling. There is a lot of pins and needles and pain in her feet. It is present day and night. It tends to bother her more at night because she cannot sleep. She is endorsing intense muscle spasms and cramps.She has been losing balance and falling more frequently. Patient also states that she has noticed her left eyelid drooping. She also has a facial droop and thinks it has been present for greater than a year. She does not recall any episode of Bell's palsy.

## 2019-12-20 NOTE — ASSESSMENT
[FreeTextEntry1] : She needs imaging and electrodiagnostic studies. She went to the ED and was placed on gabapentin 100 mg 3 times a day. I will increase this to 300 mg twice daily. She may need further blood work but I will base this on her EMG findings. She will also begin magnesium 250 mg for the muscle cramps she is experiencing.

## 2019-12-20 NOTE — PHYSICAL EXAM
[FreeTextEntry1] : Physical examination \par General: No acute distress, Awake, Alert. \par Fundus: Unable\par Neck: no Carotid bruit. \par Cardiovascular: Normal rate, Regular rhythm, No murmur. \par Chest - clear bilaterally\par \par Mental status \par Awake, alert, and oriented to person, time and place, Normal attention span and concentration, Recent and remote memory intact, Language intact, Fund of knowledge intact. \par Cranial Nerves \par II: VFF \par III, IV, VI: PERRL, EOMI. ?mild left eyelid ptosis\par V: Facial sensation is normal on the right, dec on the left.\par VII: dec NL fold on the left\par VIII: Gross hearing is intact. \par IX, X: Palate is midline and elevates symmetrically. \par XI: Trapezius normal strength. \par XII: Tongue midline without atrophy or fasciculations. \par \par Motor exam \par Muscle tone - no evidence of rigidity or resistance in all 4 extremities. \par No atrophy or fasciculations \par Muscle Strength: arms and legs, proximal and distal flexors and extensors are normal \par No UE drift.\par \par Reflexes \par Present in bilateral upper extremities; reduced at knees, absent at ankles\par Plantars right: mute. \par Plantars left: mute. \par \par Coordination \par Finger to nose: Normal. \par Heel to shin: Normal. \par \par Sensory \par Decreased pp in s/g distribution; absent vibration; impaired proprioception; Romberg positive\par \par Gait \par Wide-based; difficulty with heel knee shin; unable to tip toe.

## 2019-12-24 ENCOUNTER — RESULT REVIEW (OUTPATIENT)
Age: 74
End: 2019-12-24

## 2019-12-31 ENCOUNTER — APPOINTMENT (OUTPATIENT)
Dept: VASCULAR SURGERY | Facility: CLINIC | Age: 74
End: 2019-12-31
Payer: MEDICARE

## 2019-12-31 PROCEDURE — 99204 OFFICE O/P NEW MOD 45 MIN: CPT

## 2020-01-23 NOTE — ASSESSMENT
[FreeTextEntry1] : 73 yo F with multiple comorbidities and severe LE pain. \par Patient's legs are warm and well perfused. She has palpable pulses and mild edema. Her pain is unlikely to be due to vascular etiology. \par \par

## 2020-01-23 NOTE — HISTORY OF PRESENT ILLNESS
[FreeTextEntry1] : Patient is a 75 yo F with PMHx of CLL, HTN, HLD, facial droop, bipolar disorder presented for an evaluation of her chronic leg pain that has been persistent for a year. The feet are tingling, burning and painful in a sock-like distribution. Pain is not alleviated by leg elevation and is not worsened by prolonged standing.  Symptoms are most severe at night, preventing her from sleeping. She complains of being tired and exhausted. She was seen by a neurologist and started on gabapentin without significant relief. \par No rest pain or claudication. \par No ulcers.

## 2020-01-23 NOTE — REVIEW OF SYSTEMS
[As Noted in HPI] : as noted in HPI [Chills] : no chills [Fever] : no fever [Feeling Poorly] : not feeling poorly [Feeling Tired] : feeling tired [Leg Claudication] : no intermittent leg claudication [Lower Ext Edema] : lower extremity edema [Skin Lesions] : no skin lesions [Skin Wound] : no skin wound [Negative] : Heme/Lymph

## 2020-01-23 NOTE — PHYSICAL EXAM
[JVD] : no jugular venous distention  [Normal Breath Sounds] : Normal breath sounds [Normal Heart Sounds] : normal heart sounds [2+] : left 2+ [Ankle Swelling (On Exam)] : present [Ankle Swelling Bilaterally] : bilaterally  [Ankle Swelling On The Right] : mild [Varicose Veins Of Lower Extremities] : bilaterally [] : bilaterally [Alert] : alert [No HSM] : no hepatosplenomegaly [Oriented to Person] : oriented to person [Oriented to Place] : oriented to place [Oriented to Time] : oriented to time [de-identified] : NAD, AAO3 [Anxious] : anxious [de-identified] : NCAT [de-identified] : NO CVAT [FreeTextEntry1] : legs warm and well perfused. \par  [de-identified] : Soft, NT, ND [de-identified] : Normal muscle bulk and tone. \par FROM in all extremities. \par Strength 5/5 throughout [de-identified] : No skin lesions. No ulcers. No venous stasis changes.  [de-identified] : Strength 5/5 in all extremities. Sensation diminished in b/l toes.

## 2020-01-29 ENCOUNTER — APPOINTMENT (OUTPATIENT)
Dept: GASTROENTEROLOGY | Facility: CLINIC | Age: 75
End: 2020-01-29
Payer: MEDICARE

## 2020-01-29 ENCOUNTER — APPOINTMENT (OUTPATIENT)
Dept: NEUROLOGY | Facility: CLINIC | Age: 75
End: 2020-01-29
Payer: MEDICARE

## 2020-01-29 VITALS
HEART RATE: 84 BPM | DIASTOLIC BLOOD PRESSURE: 70 MMHG | BODY MASS INDEX: 30.04 KG/M2 | WEIGHT: 153 LBS | HEIGHT: 60 IN | SYSTOLIC BLOOD PRESSURE: 100 MMHG

## 2020-01-29 PROCEDURE — 95886 MUSC TEST DONE W/N TEST COMP: CPT

## 2020-01-29 PROCEDURE — 95911 NRV CNDJ TEST 9-10 STUDIES: CPT

## 2020-01-29 PROCEDURE — 99214 OFFICE O/P EST MOD 30 MIN: CPT

## 2020-01-29 RX ORDER — GABAPENTIN 100 MG/1
100 CAPSULE ORAL
Refills: 0 | Status: DISCONTINUED | COMMUNITY
End: 2020-01-29

## 2020-01-29 RX ORDER — QUETIAPINE 200 MG/1
200 TABLET, FILM COATED ORAL
Refills: 0 | Status: DISCONTINUED | COMMUNITY
End: 2020-01-29

## 2020-01-29 RX ORDER — NITROFURANTOIN (MONOHYDRATE/MACROCRYSTALS) 25; 75 MG/1; MG/1
100 CAPSULE ORAL
Qty: 14 | Refills: 0 | Status: DISCONTINUED | COMMUNITY
Start: 2019-09-23 | End: 2020-01-29

## 2020-01-29 RX ORDER — OMEPRAZOLE 40 MG/1
40 CAPSULE, DELAYED RELEASE ORAL
Qty: 90 | Refills: 0 | Status: DISCONTINUED | COMMUNITY
Start: 2019-08-03 | End: 2020-01-29

## 2020-01-29 NOTE — ASSESSMENT
[FreeTextEntry1] : #1.Severe intractable esophageal reflux, as described above even with some dysphasia, with erosions on her last EGD, and now with worsening symptoms\par #2 Patient has2 patches of granularity in the mid descending duodenum each of them being the shape of a dime The biopsies came back positive for lymphoma and it was a very localized process.\par 3.  very localized follicular lymphoma patches were noted on previous egd in May, and a capsule endoscopy susequently was negaive\par 4.  i HAVE SPOKEN TO DR Mota RE TREATMENT WITH APC CAUTERY AS THE PROCESS SEEMED LOCALIZED, AND PERHAPS A COMBINATION OF SNARE AND APC WILL BE SUCCESSFUL\par \par 5.  my plan is to rescope, attempt and proceed with treatment, as discussed above, and also, to re eval erosive esophagitis\par 6.  no blood thinners being used  \par although painful swallowing is a problem, it does seem to be a real dysphagia.\par \par More than 50% of the face to face time was devoted to counseling and /or coordination of care.  THis coordination of care may have included reviewing other medical notes and reports, and communicating with other health professionals\par

## 2020-01-29 NOTE — HISTORY OF PRESENT ILLNESS
[FreeTextEntry1] : 1. Intractable esophageal reflux, history of previous fundoplication surgery for esophageal reflux, ongoing symptomatology, and patient has a EGD in May 2019 showing a 3 cm hiatal hernia, classic esophageal jaw all erosions at the GE junction, no Meza's and no stricture\par \par Symptomatically, she has virtually daily substernal burning consistent with reflux, regurgitation, frequently a sense of hesitation as the food is swallowed, and finally a lump in her throat that seems to move around a bit as she tries to swallow. The regurgitation and the heartburn seemed to be her worst symptoms. Back in MayI did prescribe textile and 60 mg every morningwhich we both recall as being rather helpful natalya was unable to afford the $150 monthly see to keep up this medication and since that time she has been on pantoprazole most recently b.i.d.\par I have recommended Gaviscon advance\par

## 2020-01-29 NOTE — PHYSICAL EXAM
[Heart Rate And Rhythm] : heart rate was normal and rhythm regular [Heart Sounds Gallop] : no gallops [Murmurs] : no murmurs [Heart Sounds] : normal S1 and S2 [Heart Sounds Pericardial Friction Rub] : no pericardial rub [Bowel Sounds] : normal bowel sounds [Abdomen Soft] : soft [Abdomen Tenderness] : non-tender [] : no hepato-splenomegaly [Abdomen Mass (___ Cm)] : no abdominal mass palpated

## 2020-02-04 ENCOUNTER — RESULT REVIEW (OUTPATIENT)
Age: 75
End: 2020-02-04

## 2020-02-04 ENCOUNTER — APPOINTMENT (OUTPATIENT)
Dept: NEUROLOGY | Facility: CLINIC | Age: 75
End: 2020-02-04
Payer: MEDICARE

## 2020-02-04 VITALS
BODY MASS INDEX: 29.25 KG/M2 | SYSTOLIC BLOOD PRESSURE: 115 MMHG | HEIGHT: 60 IN | TEMPERATURE: 98.1 F | HEART RATE: 79 BPM | OXYGEN SATURATION: 97 % | WEIGHT: 149 LBS | DIASTOLIC BLOOD PRESSURE: 71 MMHG

## 2020-02-04 DIAGNOSIS — R29.6 REPEATED FALLS: ICD-10-CM

## 2020-02-04 DIAGNOSIS — G60.9 HEREDITARY AND IDIOPATHIC NEUROPATHY, UNSPECIFIED: ICD-10-CM

## 2020-02-04 PROCEDURE — 99213 OFFICE O/P EST LOW 20 MIN: CPT

## 2020-02-05 ENCOUNTER — APPOINTMENT (OUTPATIENT)
Dept: GASTROENTEROLOGY | Facility: HOSPITAL | Age: 75
End: 2020-02-05

## 2020-02-05 PROBLEM — R29.6 FREQUENT FALLS: Status: ACTIVE | Noted: 2019-12-20

## 2020-02-05 PROBLEM — G60.9 IDIOPATHIC PERIPHERAL NEUROPATHY: Status: RESOLVED | Noted: 2019-12-20 | Resolved: 2020-02-05

## 2020-02-05 NOTE — HISTORY OF PRESENT ILLNESS
[FreeTextEntry1] : 2/4/2020\par Here in follow up. We discussed EMG results. She does have moderate peripheral neuropathy. Her strength is actually intact. She began Lyrica last week. Since beginning at, she notices at least a 50% improvement in her pain. She is able to walk better. Even the numbness is somewhat improved.\par \par 12/20/19\par This is a pleasant 74-year-old right-handed woman who is being seen in neurologic consultation for evaluation of numbness and pain in her feet. Patient states that the numbness and tingling has been present for greater than a year but of late has been getting very intense. She describes constantly having cold feet. There is intense tingling. There is a lot of pins and needles and pain in her feet. It is present day and night. It tends to bother her more at night because she cannot sleep. She is endorsing intense muscle spasms and cramps.She has been losing balance and falling more frequently. Patient also states that she has noticed her left eyelid drooping. She also has a facial droop and thinks it has been present for greater than a year. She does not recall any episode of Bell's palsy.

## 2020-02-05 NOTE — DATA REVIEWED
[de-identified] : \par  [facility] MRI Report             Final\par \par No Documents Attached\par \par \par \par \par   [facility]\par                                          [address]\par                                    [place], [place]  [address]\par                                        Department of Radiology\par                                             [phone]\par \par \par Patient Name:      JARAD RAMIREZ                Location:       [facility]\par Med Rec #:        [id number]                    Account #:      [id number]\par YOB: 1945                    Ordering:       Angel Leary MD\par Age: 74               Sex:    F                 Attending:      Angel Leary MD\par PCP:        NO PRIMARY CARE PHYSICIAN\par ______________________________________________________________________________________\par \par Exam Date:      12/24/19\par Exam:         MRI BRAIN WAW IC\par Order#:       [id number]\par \par \par \par INDICATION: Left facial droop and left eyelid ptosis. Frequent falls.\par \par  TECHNIQUE: MRI of the brain was performed without and with contrast. 7 mL of Gadavist was\par administered intravenously. 0 mL was discarded.\par \par  COMPARISON: None.\par \par  FINDINGS:\par  There is no evidence of acute infarct, acute intracranial hemorrhage, mass effect or hydrocephalus.\par No extra-axial collection. Basal cisterns are patent.\par \par  No brain parenchymal signal abnormality.\par \par  There is no abnormal intracranial enhancement.\par \par  Ventricles and sulci are age-appropriate in size.\par \par  Major intracranial flow-voids are preserved.\par \par  Bilateral cataract surgery. The orbits, sellar and suprasellar structures, and craniocervical\par junction are otherwise unremarkable. Mucosal thickening involving the left maxillary sinus. Visu\par alized paranasal sinuses and mastoid air cells are otherwise clear.\par \par \par  IMPRESSION:\par  Unremarkable study.\par \par ***Electronically Signed ***\par -----------------------------------------------\par Ananth Masters MD              12/26/19 1415\par \par Dictated on 12/24/19 1455\par \par \par Report cc:  Angel Leary MD;\par \par  \par \par  Ordered by: ANGEL LEARY       Collected/Examined: 24Dec2019 01:18PM       \par Verified by: ANGEL LEARY 26Dec2019 02:37PM       \par  Result Communication: No patient communication needed at this time;\par Stage: Final       \par  Performed at: [facility]       Resulted: 24Dec2019 02:55PM       Last Updated: 26Dec2019 02:37PM       Accession: [id number]

## 2020-02-05 NOTE — PHYSICAL EXAM
[FreeTextEntry1] : Physical examination \par General: No acute distress, Awake, Alert. \par Cardiovascular: Normal rate, Regular rhythm, No murmur. \par Chest - clear bilaterally\par \par Mental status \par Awake, alert, and oriented to person, time and place, Normal attention span and concentration, Recent and remote memory intact, Language intact, Fund of knowledge intact. \par Cranial Nerves \par II: VFF \par III, IV, VI: PERRL, EOMI. ?mild left eyelid ptosis\par V: Facial sensation is normal on the right, dec on the left.\par VII: dec NL fold on the left\par VIII: Gross hearing is intact. \par IX, X: Palate is midline and elevates symmetrically. \par XI: Trapezius normal strength. \par XII: Tongue midline without atrophy or fasciculations. \par \par Motor exam \par Muscle tone - no evidence of rigidity or resistance in all 4 extremities. \par No atrophy or fasciculations \par Muscle Strength: arms and legs, proximal and distal flexors and extensors are normal \par No UE drift.\par \par Reflexes \par Present in bilateral upper extremities; reduced at knees, absent at ankles\par Plantars right: mute. \par Plantars left: mute. \par \par Coordination \par Finger to nose: Normal. \par Heel to shin: Normal. \par \par Sensory \par Decreased pp in s/g distribution; absent vibration; impaired proprioception; Romberg positive\par \par Gait \par Wide-based; difficulty with heel knee shin; unable to tip toe.

## 2020-02-05 NOTE — ASSESSMENT
[FreeTextEntry1] : Patient's balance issues secondary to the peripheral neuropathy. She has not had any recent falls. She is exercising more which I suspect will help both her balance and her tendency to fall. She declined PT for the moment. She will continue Lyrica. If needed we can increase this. Followup in 6 months or sooner if needed.

## 2020-02-18 ENCOUNTER — APPOINTMENT (OUTPATIENT)
Dept: OBGYN | Facility: CLINIC | Age: 75
End: 2020-02-18

## 2020-03-18 NOTE — HISTORY OF PRESENT ILLNESS
[de-identified] : 73 year old female presents today for CLL, referred by Dr. Jim.  \par \par Around 2012- She was not feeling well, weakness, fatigue, frequent infections- saw her PCP- who did blood work which showed elevated WBC- was referred to hematologist (Dr Selina Carver) - who diagnosed with CLL- she has been following with her since. Has intermittently had issues, which were not satisfactorily answered. \par She was last seen 4-5 years back. SHe saw Dr Monet another oncologist in the same practice in 2016 who did the FISH panel which showed del 13 q\par SHe decided to switch care and is here to establish care\par \par She continues to be tired. Has to sit after minimal activity\par She does not sleep very well - due to neuropathy - has had tingling numbness, burning bl feet x 3 years\par \par No fevers chills night sweats\par No fatigue, tiredness, apathy\par No appetite loss or weight loss/weight gain\par No chest pain, shortness of breath, palpitation, dizziness\par No abdominal pain, nausea, vomiting, diarrhea, constipation\par No bright red blood per rectum, hematemesis or melena\par No hematuria, dysuria, frequency, urgency\par No headaches, visual changes, focal weakness, tingling, numbness\par  \par s/p IV injectafer on 5/21/19; next dose today\par \par She feels dramatic improvement in her symptoms\par She has more energy, is more awake\par \par She saw Dr Austen Haley (GI) -scheduled for CT, \par Had EGD (5/19/19)- FINAL ENDOSCOPIC DIAGNOSES:\par 1.  Erosive esophagitis with grade A erosions x2 in a patient with a previous fundoplication surgery but intractable symptoms of reflux and regurgitation and occasional dysphagia.\par 2.  Patchy granularity in the descending duodenum, of uncertain significance. [de-identified] : She is seen today for follow up\par \par She had tick bite x 3 in November- went to urgent care- required antibiotics\par \par Has bl feet numbness,burning pain, which is chronic (going on for years)\par Also get cramps UE and LE\par \par Also has GERD- is going to follow up with Dr Haley\par \par Her energy levels continue to be good\par No B symtpoms

## 2020-03-18 NOTE — CONSULT LETTER
[Dear  ___] : Dear  [unfilled], [Courtesy Letter:] : I had the pleasure of seeing your patient, [unfilled], in my office today. [Please see my note below.] : Please see my note below. [Consult Closing:] : Thank you very much for allowing me to participate in the care of this patient.  If you have any questions, please do not hesitate to contact me. [Sincerely,] : Sincerely, [FreeTextEntry3] : Ron Bennett MD, MPH\par Attending Physician\par Hematology Oncology\par [facility] [place]\par [facility]\par  [DrCamilo  ___] : Dr. KIMBROUGH

## 2020-03-18 NOTE — ASSESSMENT
[FreeTextEntry1] : CLL \par Diagnosed in 2012\par Peripheral blood FISH showed 13q del\par Previously cared for by Dr Selina Carver ([facility])\par CT 1/2017 without any lymphadenopathy\par Mcintyre stage 0, low risk\par \par Overall feels good\par Fatigue better\par Labs show rising WBC, stable Hgb, platelets\par Follow up peripheral blood FISH for CLL and Rowe code BCLL from today\par However platelets continue to be less than 100K\par Close monitoring\par \par Duodenal type follicular lymphoma\par Incidental finding on EGD\par PET/CT (7/1/19) - Left retropharyngeal Lymph node (SUV 6.7)\par Unlikely causing any symptoms\par Monitor for now\par Follwo up with Dr Cordova for repeat EGD \par \par DEXA scan shows osteopenia\par \par Follow up in 3 months. CBC, CMP, ferritin, \par

## 2020-03-20 ENCOUNTER — APPOINTMENT (OUTPATIENT)
Dept: HEMATOLOGY ONCOLOGY | Facility: CLINIC | Age: 75
End: 2020-03-20

## 2020-04-28 ENCOUNTER — APPOINTMENT (OUTPATIENT)
Dept: GASTROENTEROLOGY | Facility: CLINIC | Age: 75
End: 2020-04-28

## 2020-04-29 ENCOUNTER — APPOINTMENT (OUTPATIENT)
Dept: HEMATOLOGY ONCOLOGY | Facility: CLINIC | Age: 75
End: 2020-04-29
Payer: MEDICARE

## 2020-04-29 PROCEDURE — 99214 OFFICE O/P EST MOD 30 MIN: CPT | Mod: 95

## 2020-04-29 NOTE — ASSESSMENT
[FreeTextEntry1] : CLL \par Diagnosed in 2012\par Peripheral blood FISH showed 13q del (favorable prognosis)\par IgHV mutated (favorable prognosis)\par Previously cared for by Dr Selina Carver ([facility])\par CT 1/2017 without any lymphadenopathy\par Mcintyre stage 0, low risk\par \par Overall feels tired and exhausted\par No fevers, chills, night sweats or weight loss\par Last labs from December 2019. \par She does not want home blood draw and will come to my office in 2-3 weeks\par She has 13q del and IgHV mutation which are both favorable prognostic markers\par Close monitoring\par \par Duodenal type follicular lymphoma\par Incidental finding on EGD\par PET/CT (7/1/19) - Left retropharyngeal Lymph node (SUV 6.7)\par Unlikely causing any symptoms\par Repeat EGD (2/2020) showed 3 plaques (max 2.5 cm) with biopsy cw duodenal type follicular lymphoma\par Explained that most likely it does not need treatment\par \par If she continues to feel exhausted, not explained by any other reasons, can consider rituximab with or without other agent for treatment\par \par DEXA scan shows osteopenia\par \par Follow up in few weeks CBC, CMP, ferritin, LDH

## 2020-04-29 NOTE — HISTORY OF PRESENT ILLNESS
[Home] : at home, [unfilled] , at the time of the visit. [Medical Office: ([facility])___] : at the medical office located in  [Patient] : the patient [Self] : self [de-identified] : 73 year old female presents today for CLL, referred by Dr. Jim.  \par \par Around 2012- She was not feeling well, weakness, fatigue, frequent infections- saw her PCP- who did blood work which showed elevated WBC- was referred to hematologist (Dr Selina Carver) - who diagnosed with CLL- she has been following with her since. Has intermittently had issues, which were not satisfactorily answered. \par She was last seen 4-5 years back. SHe saw Dr Monet another oncologist in the same practice in 2016 who did the FISH panel which showed del 13 q\par SHe decided to switch care and is here to establish care\par \par She continues to be tired. Has to sit after minimal activity\par She does not sleep very well - due to neuropathy - has had tingling numbness, burning bl feet x 3 years\par \par No fevers chills night sweats\par No fatigue, tiredness, apathy\par No appetite loss or weight loss/weight gain\par No chest pain, shortness of breath, palpitation, dizziness\par No abdominal pain, nausea, vomiting, diarrhea, constipation\par No bright red blood per rectum, hematemesis or melena\par No hematuria, dysuria, frequency, urgency\par No headaches, visual changes, focal weakness, tingling, numbness\par  \par s/p IV injectafer on 5/21/19; next dose today\par \par She feels dramatic improvement in her symptoms\par She has more energy, is more awake\par \par She saw Dr Austen Haley (GI) -scheduled for CT, \par Had EGD (5/19/19)- FINAL ENDOSCOPIC DIAGNOSES:\par 1.  Erosive esophagitis with grade A erosions x2 in a patient with a previous fundoplication surgery but intractable symptoms of reflux and regurgitation and occasional dysphagia.\par 2.  Patchy granularity in the descending duodenum, of uncertain significance. [de-identified] : Telemedicine (video, audio) done today for follow up\par Consent obtained from the patient\par \par She complains of extreme fatigue. \par No chest pain, sob\par \par She had a repeat EGD with Dr Haley (2/5/20)\par 1.  Esophageal ulcer, moderately large hiatal hernia recurrence, and a slight narrowing at the gastroesophageal junction, which was dilated.\par 2.  Multifocal mucosal abnormality with 3 plaques minimum noted in the descending duodenum, and APC treatment was not felt to be an appropriate intervention on the basis of this exam.\par \par Pathology\par A. Lower descending duodenum, biopsy:\par      B-cell lymphoma, follicular type.  (See comment)\par B. Mid descending duodenum, biopsy:\par      B-cell lymphoma, follicular type, low-grade.\par C. Duodenum jejunal junction, biopsy:\par      B-cell lymphoma, follicular type, low-grade.\par D. Stomach, biopsy:\par      Mild chronic gastritis without activity.\par      A Giemsa stain is negative for H. pylori.\par

## 2020-04-29 NOTE — PHYSICAL EXAM
[Normal] : full range of motion and no deformities appreciated [Ambulatory and capable of all self care but unable to carry out any work activities] : Status 2- Ambulatory and capable of all self care but unable to carry out any work activities. Up and about more than 50% of waking hours

## 2020-04-29 NOTE — CONSULT LETTER
[Dear  ___] : Dear  [unfilled], [Courtesy Letter:] : I had the pleasure of seeing your patient, [unfilled], in my office today. [Please see my note below.] : Please see my note below. [Consult Closing:] : Thank you very much for allowing me to participate in the care of this patient.  If you have any questions, please do not hesitate to contact me. [Sincerely,] : Sincerely, [DrCamilo  ___] : Dr. KIMBROUGH [FreeTextEntry3] : Ron Bennett MD, MPH\par Attending Physician\par Hematology Oncology\par [facility] [place]\par [facility]\par

## 2020-05-19 ENCOUNTER — APPOINTMENT (OUTPATIENT)
Dept: HEMATOLOGY ONCOLOGY | Facility: CLINIC | Age: 75
End: 2020-05-19

## 2020-06-15 ENCOUNTER — RX RENEWAL (OUTPATIENT)
Age: 75
End: 2020-06-15

## 2020-07-07 ENCOUNTER — APPOINTMENT (OUTPATIENT)
Dept: HEMATOLOGY ONCOLOGY | Facility: CLINIC | Age: 75
End: 2020-07-07
Payer: MEDICARE

## 2020-07-07 DIAGNOSIS — R53.83 OTHER FATIGUE: ICD-10-CM

## 2020-07-07 PROCEDURE — 99215 OFFICE O/P EST HI 40 MIN: CPT | Mod: 95

## 2020-07-07 NOTE — ASSESSMENT
[FreeTextEntry1] : CLL \par Diagnosed in 2012\par Peripheral blood FISH showed 13q del (favorable prognosis)\par IgHV mutated (favorable prognosis)\par Previously cared for by Dr Selina Carver ([facility])\par CT 1/2017 without any lymphadenopathy\par Mcintyre stage 0, low risk\par \par She has extreme fatigue/ exhaustion - which is possibly multifactorial\par No fevers, chills, night sweats or weight loss\par Recent labs reviewed and discussed\par Advised to schedule office visit and repeat blood work in 1 month\par If no other clear cause of fatigue can consider rituximab/gazvya with or without another agent\par Explained that we can try rituximab once a week x 4 which can cover CLL and Follicular lymphoma\par \par Duodenal type follicular lymphoma\par Incidental finding on EGD\par PET/CT (7/1/19) - Left retropharyngeal Lymph node (SUV 6.7)\par Unlikely causing any symptoms\par Repeat EGD (2/2020) showed 3 plaques (max 2.5 cm) with biopsy cw duodenal type follicular lymphoma\par Explained that most likely it does not need treatment\par She co stomach cramps x 2 weeks. Doubt if related to lymphoma\par \par DEXA scan shows osteopenia\par \par Follow up in 1 month\par CBC, CMP, ferritin, LDH

## 2020-07-07 NOTE — CONSULT LETTER
[Dear  ___] : Dear  [unfilled], [Courtesy Letter:] : I had the pleasure of seeing your patient, [unfilled], in my office today. [Consult Closing:] : Thank you very much for allowing me to participate in the care of this patient.  If you have any questions, please do not hesitate to contact me. [Please see my note below.] : Please see my note below. [Sincerely,] : Sincerely, [DrCamilo  ___] : Dr. KIMBROUGH [FreeTextEntry3] : Ron Bennett MD, MPH\par Attending Physician\par Hematology Oncology\par [facility] [place]\par [facility]\par

## 2020-07-07 NOTE — HISTORY OF PRESENT ILLNESS
[Medical Office: ([facility])___] : at the medical office located in  [Home] : at home, [unfilled] , at the time of the visit. [Patient] : the patient [Self] : self [de-identified] : 73 year old female presents today for CLL, referred by Dr. Jim.  \par \par Around 2012- She was not feeling well, weakness, fatigue, frequent infections- saw her PCP- who did blood work which showed elevated WBC- was referred to hematologist (Dr Selina Carver) - who diagnosed with CLL- she has been following with her since. Has intermittently had issues, which were not satisfactorily answered. \par She was last seen 4-5 years back. SHe saw Dr Monet another oncologist in the same practice in 2016 who did the FISH panel which showed del 13 q\par SHe decided to switch care and is here to establish care\par \par She continues to be tired. Has to sit after minimal activity\par She does not sleep very well - due to neuropathy - has had tingling numbness, burning bl feet x 3 years\par \par No fevers chills night sweats\par No fatigue, tiredness, apathy\par No appetite loss or weight loss/weight gain\par No chest pain, shortness of breath, palpitation, dizziness\par No abdominal pain, nausea, vomiting, diarrhea, constipation\par No bright red blood per rectum, hematemesis or melena\par No hematuria, dysuria, frequency, urgency\par No headaches, visual changes, focal weakness, tingling, numbness\par  \par s/p IV injectafer on 5/21/19; next dose today\par \par She feels dramatic improvement in her symptoms\par She has more energy, is more awake\par \par She saw Dr Austen Haley (GI) -scheduled for CT, \par Had EGD (5/19/19)- FINAL ENDOSCOPIC DIAGNOSES:\par 1.  Erosive esophagitis with grade A erosions x2 in a patient with a previous fundoplication surgery but intractable symptoms of reflux and regurgitation and occasional dysphagia.\par 2.  Patchy granularity in the descending duodenum, of uncertain significance.\par \par Repeat EGD with Dr Haley (2/5/20)\par 1.  Esophageal ulcer, moderately large hiatal hernia recurrence, and a slight narrowing at the gastroesophageal junction, which was dilated.\par 2.  Multifocal mucosal abnormality with 3 plaques minimum noted in the descending duodenum, and APC treatment was not felt to be an appropriate intervention on the basis of this exam.\par \par Pathology\par A. Lower descending duodenum, biopsy:\par      B-cell lymphoma, follicular type.  (See comment)\par B. Mid descending duodenum, biopsy:\par      B-cell lymphoma, follicular type, low-grade.\par C. Duodenum jejunal junction, biopsy:\par      B-cell lymphoma, follicular type, low-grade.\par D. Stomach, biopsy:\par      Mild chronic gastritis without activity.\par      A Giemsa stain is negative for H. pylori.\par  [de-identified] : Telemedicine (video, audio) done today for follow up\par Consent obtained from the patient\par \par She has now moved to [facility] due to the COVID pandemic\par She complains of extreme fatigue. \par Attributes to lyrica which she is on for neuropathy. She plans to discuss with Dr Leary (neurology) about tapering it off\par \par Reports improvement in acid reflux\par \par No fevers, night sweats\par \par She had blood work done 6/15/20 at [facility] which were obtained and reviewed

## 2020-07-14 RX ORDER — GABAPENTIN 300 MG/1
300 CAPSULE ORAL TWICE DAILY
Qty: 60 | Refills: 2 | Status: DISCONTINUED | COMMUNITY
Start: 2019-12-20 | End: 2020-07-14

## 2020-08-03 ENCOUNTER — APPOINTMENT (OUTPATIENT)
Dept: NEUROLOGY | Facility: CLINIC | Age: 75
End: 2020-08-03

## 2020-08-04 ENCOUNTER — APPOINTMENT (OUTPATIENT)
Dept: INTERNAL MEDICINE | Facility: CLINIC | Age: 75
End: 2020-08-04

## 2020-08-04 ENCOUNTER — APPOINTMENT (OUTPATIENT)
Dept: HEMATOLOGY ONCOLOGY | Facility: CLINIC | Age: 75
End: 2020-08-04

## 2020-08-13 ENCOUNTER — APPOINTMENT (OUTPATIENT)
Dept: HEMATOLOGY ONCOLOGY | Facility: CLINIC | Age: 75
End: 2020-08-13
Payer: MEDICARE

## 2020-08-13 PROCEDURE — 99215 OFFICE O/P EST HI 40 MIN: CPT | Mod: 95

## 2020-08-13 NOTE — HISTORY OF PRESENT ILLNESS
[Home] : at home, [unfilled] , at the time of the visit. [Medical Office: ([facility])___] : at the medical office located in  [Patient] : the patient [Self] : self [de-identified] : 73 year old female presents today for CLL, referred by Dr. Jim.  \par \par Around 2012- She was not feeling well, weakness, fatigue, frequent infections- saw her PCP- who did blood work which showed elevated WBC- was referred to hematologist (Dr Selina Carver) - who diagnosed with CLL- she has been following with her since. Has intermittently had issues, which were not satisfactorily answered. \par She was last seen 4-5 years back. SHe saw Dr Monet another oncologist in the same practice in 2016 who did the FISH panel which showed del 13 q\par SHe decided to switch care and is here to establish care\par \par She continues to be tired. Has to sit after minimal activity\par She does not sleep very well - due to neuropathy - has had tingling numbness, burning bl feet x 3 years\par \par No fevers chills night sweats\par No fatigue, tiredness, apathy\par No appetite loss or weight loss/weight gain\par No chest pain, shortness of breath, palpitation, dizziness\par No abdominal pain, nausea, vomiting, diarrhea, constipation\par No bright red blood per rectum, hematemesis or melena\par No hematuria, dysuria, frequency, urgency\par No headaches, visual changes, focal weakness, tingling, numbness\par  \par s/p IV injectafer on 5/21/19; next dose today\par \par She feels dramatic improvement in her symptoms\par She has more energy, is more awake\par \par She saw Dr Austen Haley (GI) -scheduled for CT, \par Had EGD (5/19/19)- FINAL ENDOSCOPIC DIAGNOSES:\par 1.  Erosive esophagitis with grade A erosions x2 in a patient with a previous fundoplication surgery but intractable symptoms of reflux and regurgitation and occasional dysphagia.\par 2.  Patchy granularity in the descending duodenum, of uncertain significance.\par \par Repeat EGD with Dr Haley (2/5/20)\par 1.  Esophageal ulcer, moderately large hiatal hernia recurrence, and a slight narrowing at the gastroesophageal junction, which was dilated.\par 2.  Multifocal mucosal abnormality with 3 plaques minimum noted in the descending duodenum, and APC treatment was not felt to be an appropriate intervention on the basis of this exam.\par \par Pathology\par A. Lower descending duodenum, biopsy:\par      B-cell lymphoma, follicular type.  (See comment)\par B. Mid descending duodenum, biopsy:\par      B-cell lymphoma, follicular type, low-grade.\par C. Duodenum jejunal junction, biopsy:\par      B-cell lymphoma, follicular type, low-grade.\par D. Stomach, biopsy:\par      Mild chronic gastritis without activity.\par      A Giemsa stain is negative for H. pylori.\par  [de-identified] : Telemedicine (video, audio) done today for follow up\par Consent obtained from the patient\par \par She continues to be at the [facility] due to the COVID pandemic. Her daughter lives in [place]. but she is resistant to come to [place]\par \par She co worsening neuropathy- spoke to Dr Leary-> was prescribed muscle relaxants\par She cannot feel her feet\par \par She complains of extreme fatigue. \par Acid reflux is starting to get worse. She feels like she required the dilation procedure again\par No fevers, night sweats\par \par She also reports getting tick bite x 5- she is planning to get Lymes test with her PCP\par \par She had blood work done 6/15/20 at [facility] which were obtained and reviewed

## 2020-08-13 NOTE — CONSULT LETTER
[Dear  ___] : Dear  [unfilled], [Courtesy Letter:] : I had the pleasure of seeing your patient, [unfilled], in my office today. [Please see my note below.] : Please see my note below. [Consult Closing:] : Thank you very much for allowing me to participate in the care of this patient.  If you have any questions, please do not hesitate to contact me. [Sincerely,] : Sincerely, [DrCamilo  ___] : Dr. KIMBROUGH [DrCamilo ___] : Dr. KIMBROUGH [FreeTextEntry3] : Ron Bennett MD, MPH\par Attending Physician\par Hematology Oncology\par [facility] [place]\par [facility]\par

## 2020-08-13 NOTE — ASSESSMENT
[FreeTextEntry1] : CLL \par Diagnosed in 2012\par Peripheral blood FISH showed 13q del (favorable prognosis)\par IgHV mutated (favorable prognosis)\par Previously cared for by Dr Selina Carver ([facility])\par CT 1/2017 without any lymphadenopathy\par Mcintyre stage 0, low risk\par \par She has extreme fatigue/ exhaustion - which is possibly multifactorial\par No fevers, chills, night sweats or weight loss\par No new labs\par Advised to do a office visit where we can do labs and examine her\par SHe is scheduled to see her PCP next week, where she will have blood work, She will see me a week after the blood work\par If no other clear cause of fatigue can consider rituximab/gazvya with or without another agent\par Explained that we can try rituximab once a week x 4 which can cover CLL and Follicular lymphoma\par \par Duodenal type follicular lymphoma\par Incidental finding on EGD\par PET/CT (7/1/19) - Left retropharyngeal Lymph node (SUV 6.7)\par Unlikely causing any symptoms\par Repeat EGD (2/2020) showed 3 plaques (max 2.5 cm) with biopsy cw duodenal type follicular lymphoma\par Explained that most likely it does not need treatment\par Although rituximab does work partially for follicular lymphoma\par \par Neuropathy\par FOllows  with neurology\par \par Follow up in 2 weeks\par CBC, CMP, ferritin, LDH, Serum protein electorphoresis, Serum immunofixation, Serum free light chains ratio

## 2020-08-20 ENCOUNTER — LABORATORY RESULT (OUTPATIENT)
Age: 75
End: 2020-08-20

## 2020-08-20 ENCOUNTER — RESULT REVIEW (OUTPATIENT)
Age: 75
End: 2020-08-20

## 2020-08-20 ENCOUNTER — APPOINTMENT (OUTPATIENT)
Dept: INTERNAL MEDICINE | Facility: CLINIC | Age: 75
End: 2020-08-20
Payer: MEDICARE

## 2020-08-20 VITALS
OXYGEN SATURATION: 97 % | HEART RATE: 101 BPM | SYSTOLIC BLOOD PRESSURE: 110 MMHG | DIASTOLIC BLOOD PRESSURE: 68 MMHG | HEIGHT: 60 IN | BODY MASS INDEX: 29.06 KG/M2 | WEIGHT: 148 LBS | TEMPERATURE: 97 F

## 2020-08-20 DIAGNOSIS — K21.9 GASTRO-ESOPHAGEAL REFLUX DISEASE W/OUT ESOPHAGITIS: ICD-10-CM

## 2020-08-20 DIAGNOSIS — J00 ACUTE NASOPHARYNGITIS [COMMON COLD]: ICD-10-CM

## 2020-08-20 DIAGNOSIS — R07.81 PLEURODYNIA: ICD-10-CM

## 2020-08-20 PROCEDURE — 36415 COLL VENOUS BLD VENIPUNCTURE: CPT

## 2020-08-20 PROCEDURE — 99214 OFFICE O/P EST MOD 30 MIN: CPT | Mod: 25

## 2020-08-20 NOTE — ASSESSMENT
[FreeTextEntry1] : Plan as above, x-rays today as ordered.  I will review the x-ray results with the patient tomorrow morning with the possibility of starting an NSAID.\par \par The patient was advised to call for any problems or questions.\par \par The patient will arrange her comprehensive annual evaluation, which is overdue, with her PCP in the very near future.

## 2020-08-20 NOTE — REVIEW OF SYSTEMS
[Fatigue] : fatigue [Chest Pain] : chest pain [Negative] : Integumentary [FreeTextEntry7] : Chronic epigastric burning from refractory GERD. [de-identified] : Chronic lower extremity neuropathy with associated pain.

## 2020-08-20 NOTE — PHYSICAL EXAM
[Normal Sclera/Conjunctiva] : normal sclera/conjunctiva [Normal Outer Ear/Nose] : the outer ears and nose were normal in appearance [Normal Percussion] : the chest was normal to percussion [No Edema] : there was no peripheral edema [Normal] : soft, non-tender, non-distended, no masses palpated, no HSM and normal bowel sounds [Pedal Pulses Present] : the pedal pulses are present [de-identified] : Engorged tick right upper back, with surrounding erythema, 1 cm diameter. [de-identified] : Very anxious with pressured speech.

## 2020-08-20 NOTE — HISTORY OF PRESENT ILLNESS
[FreeTextEntry8] : CC: 75-year-old female here because of recent right flank pain.\par \par HPI: The patient was originally scheduled to have a preoperative evaluation today, but reports that she decided 2 weeks ago to forego the planned elective surgery at this time.  She initially stated that her decision was based on the increasing prevalence of COVID-19 in her [facility], but then reported that it was really because of pain she developed in her right flank 2 weeks ago.  She first noticed this pain 2 weeks ago, but then it seemed to subside.  It then recurred approximately 1 week ago and has been particularly bad in the past 2 days.  She describes the pain as being in the region of her lower right anterolateral chest, and that it worsens with deep breathing or twisting.  She denies having had any shortness of breath and in fact states that she has been using her elliptical machine without any dyspnea.  She has had no anterior chest pain or any other cardiovascular symptoms.  She also reports that she has had no new gastrointestinal symptoms.\par The patient is also requesting a Lyme test because of a history of recurrent tick bites.  She denies any recent symptoms suggestive of Lyme disease, but is concerned about her numerous previous tick bites.

## 2020-08-21 ENCOUNTER — RESULT REVIEW (OUTPATIENT)
Age: 75
End: 2020-08-21

## 2020-08-21 LAB
ALBUMIN SERPL ELPH-MCNC: 4 G/DL
ALP BLD-CCNC: 180 U/L
ALT SERPL-CCNC: 21 U/L
ANION GAP SERPL CALC-SCNC: 11 MMOL/L
AST SERPL-CCNC: 23 U/L
BASOPHILS # BLD AUTO: 0.04 K/UL
BASOPHILS NFR BLD AUTO: 0.1 %
BILIRUB SERPL-MCNC: 0.6 MG/DL
BUN SERPL-MCNC: 19 MG/DL
CALCIUM SERPL-MCNC: 9.4 MG/DL
CHLORIDE SERPL-SCNC: 105 MMOL/L
CO2 SERPL-SCNC: 24 MMOL/L
CREAT SERPL-MCNC: 0.95 MG/DL
EOSINOPHIL # BLD AUTO: 0.13 K/UL
EOSINOPHIL NFR BLD AUTO: 0.3 %
FERRITIN SERPL-MCNC: 275 NG/ML
GLUCOSE SERPL-MCNC: 112 MG/DL
HCT VFR BLD CALC: 35.6 %
HGB BLD-MCNC: 11 G/DL
IMM GRANULOCYTES NFR BLD AUTO: 0.1 %
IRON SATN MFR SERPL: 17 %
IRON SERPL-MCNC: 35 UG/DL
LDH SERPL-CCNC: 160 U/L
LYMPHOCYTES # BLD AUTO: 47.13 K/UL
LYMPHOCYTES NFR BLD AUTO: 92.4 %
MAN DIFF?: NORMAL
MCHC RBC-ENTMCNC: 30.9 GM/DL
MCHC RBC-ENTMCNC: 32.1 PG
MCV RBC AUTO: 103.8 FL
MONOCYTES # BLD AUTO: 0.4 K/UL
MONOCYTES NFR BLD AUTO: 0.8 %
NEUTROPHILS # BLD AUTO: 3.24 K/UL
NEUTROPHILS NFR BLD AUTO: 6.3 %
PLATELET # BLD AUTO: 193 K/UL
POTASSIUM SERPL-SCNC: 4.8 MMOL/L
PROT SERPL-MCNC: 7.3 G/DL
RBC # BLD: 3.43 M/UL
RBC # FLD: 14.4 %
SARS-COV-2 IGG SERPL IA-ACNC: 0.1 INDEX
SARS-COV-2 IGG SERPL QL IA: NEGATIVE
SODIUM SERPL-SCNC: 140 MMOL/L
TIBC SERPL-MCNC: 208 UG/DL
UIBC SERPL-MCNC: 173 UG/DL
WBC # FLD AUTO: 51 K/UL

## 2020-08-21 RX ORDER — AZITHROMYCIN 250 MG/1
250 TABLET, FILM COATED ORAL
Qty: 1 | Refills: 0 | Status: COMPLETED | COMMUNITY
Start: 2020-08-21 | End: 2020-08-26

## 2020-08-25 ENCOUNTER — TRANSCRIPTION ENCOUNTER (OUTPATIENT)
Age: 75
End: 2020-08-25

## 2020-08-25 RX ORDER — DOXYCYCLINE HYCLATE 100 MG/1
100 TABLET, DELAYED RELEASE ORAL
Qty: 2 | Refills: 0 | Status: COMPLETED | COMMUNITY
Start: 2020-08-20 | End: 2020-08-25

## 2020-08-28 ENCOUNTER — APPOINTMENT (OUTPATIENT)
Dept: INTERNAL MEDICINE | Facility: CLINIC | Age: 75
End: 2020-08-28
Payer: MEDICARE

## 2020-08-28 ENCOUNTER — TRANSCRIPTION ENCOUNTER (OUTPATIENT)
Age: 75
End: 2020-08-28

## 2020-08-28 VITALS
SYSTOLIC BLOOD PRESSURE: 125 MMHG | HEIGHT: 60 IN | DIASTOLIC BLOOD PRESSURE: 80 MMHG | WEIGHT: 144 LBS | BODY MASS INDEX: 28.27 KG/M2 | TEMPERATURE: 99.1 F

## 2020-08-28 LAB — B BURGDOR IGG+IGM SER QL IB: NORMAL

## 2020-08-28 PROCEDURE — 99204 OFFICE O/P NEW MOD 45 MIN: CPT

## 2020-08-28 RX ORDER — DOXYCYCLINE HYCLATE 100 MG/1
100 CAPSULE ORAL
Qty: 42 | Refills: 0 | Status: COMPLETED | COMMUNITY
Start: 2020-08-28 | End: 2020-09-18

## 2020-08-28 NOTE — REVIEW OF SYSTEMS
[Fatigue] : fatigue [Lower Ext Edema] : lower extremity edema [Chest Pain] : chest pain [Negative] : Heme/Lymph

## 2020-08-28 NOTE — ASSESSMENT
[FreeTextEntry1] : Patient presents with right-sided pleuritic chest pain and a very small right pleural effusion. The most likely diagnosis is viral pleurisy. However in view of the history of CLL we must be observant for possible superimposed opportunistic pathogens. I see no evidence of definite infiltrates on the CAT scan. The density at the right base highly represents compressive atelectasis due to the pleural effusion. I doubt we are dealing with TB or fungal disease. I think that was appropriate to treat the patient with a course of an antibiotic. I will asked the patient to take Aleve 2 tablets b.i.d. with food for the next 7 days. She is to call me in 7 days for followup.

## 2020-08-28 NOTE — PHYSICAL EXAM
[No Acute Distress] : no acute distress [Well Nourished] : well nourished [Well Developed] : well developed [Well-Appearing] : well-appearing [Normal Sclera/Conjunctiva] : normal sclera/conjunctiva [PERRL] : pupils equal round and reactive to light [EOMI] : extraocular movements intact [Normal Outer Ear/Nose] : the outer ears and nose were normal in appearance [Normal Oropharynx] : the oropharynx was normal [No JVD] : no jugular venous distention [Supple] : supple [Thyroid Normal, No Nodules] : the thyroid was normal and there were no nodules present [No Lymphadenopathy] : no lymphadenopathy [No Accessory Muscle Use] : no accessory muscle use [No Respiratory Distress] : no respiratory distress  [Normal Rate] : normal rate  [Clear to Auscultation] : lungs were clear to auscultation bilaterally [Regular Rhythm] : with a regular rhythm [No Murmur] : no murmur heard [Normal S1, S2] : normal S1 and S2 [No Abdominal Bruit] : a ~M bruit was not heard ~T in the abdomen [No Carotid Bruits] : no carotid bruits [No Varicosities] : no varicosities [Pedal Pulses Present] : the pedal pulses are present [No Palpable Aorta] : no palpable aorta [No Edema] : there was no peripheral edema [Soft] : abdomen soft [No Extremity Clubbing/Cyanosis] : no extremity clubbing/cyanosis [Non Tender] : non-tender [Non-distended] : non-distended [No Masses] : no abdominal mass palpated [No HSM] : no HSM [Normal Bowel Sounds] : normal bowel sounds [Normal Posterior Cervical Nodes] : no posterior cervical lymphadenopathy [Normal Anterior Cervical Nodes] : no anterior cervical lymphadenopathy [No CVA Tenderness] : no CVA  tenderness [No Spinal Tenderness] : no spinal tenderness [No Joint Swelling] : no joint swelling [Grossly Normal Strength/Tone] : grossly normal strength/tone [No Rash] : no rash [Coordination Grossly Intact] : coordination grossly intact [No Focal Deficits] : no focal deficits [Normal Gait] : normal gait [Normal Affect] : the affect was normal [Normal Insight/Judgement] : insight and judgment were intact

## 2020-08-28 NOTE — HISTORY OF PRESENT ILLNESS
[FreeTextEntry1] : Evaluation for right pleuritic chest pain [de-identified] : This is a 75-year-old female with a history of CLL diagnosed 8 years ago. She is on no medication for CLL. She states that 2 weeks ago she developed sharp right anterolateral subcostal pain. The pain is sharp and pleuritic in nature. There was no trauma. It is worse with certain movements. She gets slightly short of breath because of the pleuritic chest pain but otherwise is not really short of breath. She states the pain is now better about 50%. She was placed on a Z-Garrick which she completed 2 days ago. She states it still hurts with deep breath. She underwent chest x-ray and CT angiogram of the chest which reveals a small pleural effusion and very small compressive area of atelectasis adjacent to the pleural effusion. There is also a 5 mm groundglass nodule in the left apex. There is no significant cough. No fever or chills. No sore throat or rhinorrhea. Her past medical history is reviewed. There is no history of heart disease. She does state that she was told of possible asthma in her 30s when she developed wheezing which she caught a cold. However she has not used regular bronchodilator therapy in many years. She did smoke socially from the ages of [age] but has not smoked since. Of note her labs were reviewed significant for a white count of 51,000 with 92% lymphs. Medications were reviewed.

## 2020-09-01 ENCOUNTER — APPOINTMENT (OUTPATIENT)
Dept: NEUROLOGY | Facility: CLINIC | Age: 75
End: 2020-09-01
Payer: MEDICARE

## 2020-09-01 VITALS
BODY MASS INDEX: 24.75 KG/M2 | HEART RATE: 81 BPM | SYSTOLIC BLOOD PRESSURE: 119 MMHG | HEIGHT: 64 IN | WEIGHT: 145 LBS | DIASTOLIC BLOOD PRESSURE: 79 MMHG | TEMPERATURE: 97 F

## 2020-09-01 PROCEDURE — 99215 OFFICE O/P EST HI 40 MIN: CPT

## 2020-09-01 RX ORDER — PREGABALIN 50 MG/1
50 CAPSULE ORAL 3 TIMES DAILY
Qty: 90 | Refills: 1 | Status: DISCONTINUED | COMMUNITY
Start: 2020-01-30 | End: 2020-09-01

## 2020-09-01 RX ORDER — METHOCARBAMOL 500 MG/1
500 TABLET, FILM COATED ORAL TWICE DAILY
Qty: 60 | Refills: 0 | Status: DISCONTINUED | COMMUNITY
Start: 2020-07-14 | End: 2020-09-01

## 2020-09-02 LAB
ALBUMIN MFR SERPL ELPH: 48.7 %
ALBUMIN SERPL-MCNC: 3.6 G/DL
ALBUMIN/GLOB SERPL: 1 RATIO
ALPHA1 GLOB MFR SERPL ELPH: 5.9 %
ALPHA1 GLOB SERPL ELPH-MCNC: 0.4 G/DL
ALPHA2 GLOB MFR SERPL ELPH: 10.6 %
ALPHA2 GLOB SERPL ELPH-MCNC: 0.8 G/DL
B-GLOBULIN MFR SERPL ELPH: 10.1 %
B-GLOBULIN SERPL ELPH-MCNC: 0.7 G/DL
GAMMA GLOB FLD ELPH-MCNC: 1.8 G/DL
GAMMA GLOB MFR SERPL ELPH: 24.7 %
INTERPRETATION SERPL IEP-IMP: NORMAL
M PROTEIN MFR SERPL ELPH: NORMAL
MONOCLON BAND OBS SERPL: NORMAL
PROT SERPL-MCNC: 7.3 G/DL
PROT SERPL-MCNC: 7.3 G/DL

## 2020-09-02 NOTE — HISTORY OF PRESENT ILLNESS
[FreeTextEntry1] : 9/2/2020\par The patient is here in followup. She is accompanied to this visit by her daughter. She continues to have significant numbness and tingling in her feet. She also has significant burning. Putting pressure on her feet makes it much worse especially when she walks.\par Patient has been isolating in her home stating her. Apparently she did have 4-5 tick bites. She started noticing excessive swelling involving her feet. Because of these and other symptoms which included a left facial droop she went to see her primary care physician. Dr. Elizondo removed a tick from her back. She was Lyme positive. She had developed some pleurisy. She was treated with doxycycline and Advil. Once her infection was treated, she noted swelling in her feet improved considerably. \par \par When I had first started on Lyrica, she noted tremendous improvement in her pain. She had been holding off the Lyrica because she thought that was causing her neuropathic symptoms and swollen feet.\par \par Gabapentin caused her headaches.\par \par Following closes with psychiatrist- currently taking Seroquel and Klonopin. Bipolar disease under good control.\par 2/4/2020\par Here in follow up. We discussed EMG results. She does have moderate peripheral neuropathy. Her strength is actually intact. She began Lyrica last week. Since beginning at, she notices at least a 50% improvement in her pain. She is able to walk better. Even the numbness is somewhat improved.\par \par 12/20/19\par This is a pleasant 74-year-old right-handed woman who is being seen in neurologic consultation for evaluation of numbness and pain in her feet. Patient states that the numbness and tingling has been present for greater than a year but of late has been getting very intense. She describes constantly having cold feet. There is intense tingling. There is a lot of pins and needles and pain in her feet. It is present day and night. It tends to bother her more at night because she cannot sleep. She is endorsing intense muscle spasms and cramps.She has been losing balance and falling more frequently. Patient also states that she has noticed her left eyelid drooping. She also has a facial droop and thinks it has been present for greater than a year. She does not recall any episode of Bell's palsy.

## 2020-09-02 NOTE — DATA REVIEWED
[de-identified] : \par  [place] MRI Report             Final\par \par No Documents Attached\par \par \par \par \par   [facility]\par                                          [address]\par                                    [place], [place]  [address]\par                                        Department of Radiology\par                                             [phone]\par \par \par Patient Name:      JARAD RAMIREZ                Location:       [facility]\par Med Rec #:        [id number]                    Account #:      [id number]\par YOB: 1945                    Ordering:       Angel Leary MD\par Age: 74               Sex:    F                 Attending:      Angel Leary MD\par PCP:        NO PRIMARY CARE PHYSICIAN\par ______________________________________________________________________________________\par \par Exam Date:      12/24/19\par Exam:         MRI BRAIN WAW IC\par Order#:       MRI [id number]\par \par \par \par INDICATION: Left facial droop and left eyelid ptosis. Frequent falls.\par \par  TECHNIQUE: MRI of the brain was performed without and with contrast. 7 mL of Gadavist was\par administered intravenously. 0 mL was discarded.\par \par  COMPARISON: None.\par \par  FINDINGS:\par  There is no evidence of acute infarct, acute intracranial hemorrhage, mass effect or hydrocephalus.\par No extra-axial collection. Basal cisterns are patent.\par \par  No brain parenchymal signal abnormality.\par \par  There is no abnormal intracranial enhancement.\par \par  Ventricles and sulci are age-appropriate in size.\par \par  Major intracranial flow-voids are preserved.\par \par  Bilateral cataract surgery. The orbits, sellar and suprasellar structures, and craniocervical\par junction are otherwise unremarkable. Mucosal thickening involving the left maxillary sinus. Visu\par alized paranasal sinuses and mastoid air cells are otherwise clear.\par \par \par  IMPRESSION:\par  Unremarkable study.\par \par ***Electronically Signed ***\par -----------------------------------------------\par Ananth Masters MD              12/26/19 1415\par \par Dictated on 12/24/19 1455\par \par \par Report cc:  Angel Leary MD;\par \par  \par \par  Ordered by: ANGEL LEARY       Collected/Examined: 24Dec2019 01:18PM       \par Verified by: ANGEL LEARY 26Dec2019 02:37PM       \par  Result Communication: No patient communication needed at this time;\par Stage: Final       \par  Performed at: [facility]       Resulted: 24Dec2019 02:55PM       Last Updated: 26Dec2019 02:37PM       Accession: [id number]

## 2020-09-02 NOTE — ASSESSMENT
[FreeTextEntry1] : She is willing to try Lyrica again. I am going to prescribe Lyrica 50 mg twice daily. She will start initially at 50 mg daily. \par If she has swelling issues again, I would consider trying her on Cymbalta. Would need to discuss with Dr. Epifanio Saba her psychiatrist ([phone])\par She will keep me posted. I will see her between 6 months.

## 2020-09-08 ENCOUNTER — RX RENEWAL (OUTPATIENT)
Age: 75
End: 2020-09-08

## 2020-09-10 ENCOUNTER — APPOINTMENT (OUTPATIENT)
Dept: HEMATOLOGY ONCOLOGY | Facility: CLINIC | Age: 75
End: 2020-09-10
Payer: MEDICARE

## 2020-09-10 ENCOUNTER — RESULT REVIEW (OUTPATIENT)
Age: 75
End: 2020-09-10

## 2020-09-10 VITALS
WEIGHT: 143 LBS | HEIGHT: 63.98 IN | BODY MASS INDEX: 24.41 KG/M2 | DIASTOLIC BLOOD PRESSURE: 81 MMHG | RESPIRATION RATE: 18 BRPM | TEMPERATURE: 97 F | SYSTOLIC BLOOD PRESSURE: 123 MMHG | HEART RATE: 85 BPM | OXYGEN SATURATION: 98 %

## 2020-09-10 DIAGNOSIS — J90 PLEURAL EFFUSION, NOT ELSEWHERE CLASSIFIED: ICD-10-CM

## 2020-09-10 PROCEDURE — 99215 OFFICE O/P EST HI 40 MIN: CPT

## 2020-09-10 NOTE — CONSULT LETTER
[Dear  ___] : Dear  [unfilled], [Courtesy Letter:] : I had the pleasure of seeing your patient, [unfilled], in my office today. [Please see my note below.] : Please see my note below. [Consult Closing:] : Thank you very much for allowing me to participate in the care of this patient.  If you have any questions, please do not hesitate to contact me. [DrCamilo  ___] : Dr. KIMBROUGH [Sincerely,] : Sincerely, [DrCamilo ___] : Dr. KIMBROUGH [FreeTextEntry3] : Ron Bennett MD, MPH\par Attending Physician\par Hematology Oncology\par [facility]\par [facility]\par

## 2020-09-10 NOTE — ASSESSMENT
[FreeTextEntry1] : CLL \par Diagnosed in 2012\par Peripheral blood FISH showed 13q del (favorable prognosis)\par IgHV mutated (favorable prognosis)\par Previously cared for by Dr Selina Carver ([facility])\par CT 1/2017 without any lymphadenopathy\par Mcintyre stage 0, low risk\par \par She has extreme fatigue/ exhaustion - which is possibly multifactorial\par No fevers, chills, night sweats or weight loss\par Labs reviewed and discussed\par Has anemia, thrombocytopenia, leucocytosis related to CLL\par No obvious lymphadenopathy\par Discussed about treatment options including rituximab/gazvya with or without another agent (ibrutininb/venetoclax/bendamustine)\par Explained that we can also try rituximab once a week x 4 which can cover CLL and Follicular lymphoma\par She wants to complete Abx for her Lymes disease and assess the outcome of her cardiac work up before agreeing to any treatment\par \par Duodenal type follicular lymphoma\par Incidental finding on EGD\par PET/CT (7/1/19) - Left retropharyngeal Lymph node (SUV 6.7)\par Unlikely causing any symptoms\par Repeat EGD (2/2020) showed 3 plaques (max 2.5 cm) with biopsy cw duodenal type follicular lymphoma\par Explained that most likely it does not need treatment\par Although rituximab does work partially for follicular lymphoma\par \par Neuropathy\par FOllows  with neurology\par \par Follow up in 2 months\par CBC, CMP, ferritin, LDH

## 2020-09-10 NOTE — PHYSICAL EXAM
[Ambulatory and capable of all self care but unable to carry out any work activities] : Status 2- Ambulatory and capable of all self care but unable to carry out any work activities. Up and about more than 50% of waking hours [Normal] : grossly intact [de-identified] : Basline anxious

## 2020-09-10 NOTE — HISTORY OF PRESENT ILLNESS
[Home] : at home, [unfilled] , at the time of the visit. [Medical Office: ([facility])___] : at the medical office located in  [Self] : self [Patient] : the patient [de-identified] : SHe is seen today for folow up.\par Accompanied by her grand-daughter who is a nurse\par She is now back in [place]\par \par She had extreme fatigue\par Has sob- CT showed cardiomegaly, pleural effusion- saw Dr Elizondo. She is scheduled for echo, etc\par She also has worsening neuropathy- follows with Dr Leary\par Acid reflux is starting to get worse. She feels like she requires the dilation procedure with Dr Haley again\par No fevers, night sweats\par \par  [de-identified] : 73 year old female presents today for CLL, referred by Dr. Jim.  \par \par Around 2012- She was not feeling well, weakness, fatigue, frequent infections- saw her PCP- who did blood work which showed elevated WBC- was referred to hematologist (Dr Selina Carver) - who diagnosed with CLL- she has been following with her since. Has intermittently had issues, which were not satisfactorily answered. \par She was last seen 4-5 years back. SHe saw Dr Monet another oncologist in the same practice in 2016 who did the FISH panel which showed del 13 q\par SHe decided to switch care and is here to establish care\par \par She continues to be tired. Has to sit after minimal activity\par She does not sleep very well - due to neuropathy - has had tingling numbness, burning bl feet x 3 years\par \par No fevers chills night sweats\par No fatigue, tiredness, apathy\par No appetite loss or weight loss/weight gain\par No chest pain, shortness of breath, palpitation, dizziness\par No abdominal pain, nausea, vomiting, diarrhea, constipation\par No bright red blood per rectum, hematemesis or melena\par No hematuria, dysuria, frequency, urgency\par No headaches, visual changes, focal weakness, tingling, numbness\par  \par s/p IV injectafer on 5/21/19; next dose today\par \par She feels dramatic improvement in her symptoms\par She has more energy, is more awake\par \par She saw Dr Austen Haley (GI) -scheduled for CT, \par Had EGD (5/19/19)- FINAL ENDOSCOPIC DIAGNOSES:\par 1.  Erosive esophagitis with grade A erosions x2 in a patient with a previous fundoplication surgery but intractable symptoms of reflux and regurgitation and occasional dysphagia.\par 2.  Patchy granularity in the descending duodenum, of uncertain significance.\par \par Repeat EGD with Dr Haley (2/5/20)\par 1.  Esophageal ulcer, moderately large hiatal hernia recurrence, and a slight narrowing at the gastroesophageal junction, which was dilated.\par 2.  Multifocal mucosal abnormality with 3 plaques minimum noted in the descending duodenum, and APC treatment was not felt to be an appropriate intervention on the basis of this exam.\par \par Pathology\par A. Lower descending duodenum, biopsy:\par      B-cell lymphoma, follicular type.  (See comment)\par B. Mid descending duodenum, biopsy:\par      B-cell lymphoma, follicular type, low-grade.\par C. Duodenum jejunal junction, biopsy:\par      B-cell lymphoma, follicular type, low-grade.\par D. Stomach, biopsy:\par      Mild chronic gastritis without activity.\par      A Giemsa stain is negative for H. pylori.\par

## 2020-09-21 ENCOUNTER — APPOINTMENT (OUTPATIENT)
Dept: INTERNAL MEDICINE | Facility: CLINIC | Age: 75
End: 2020-09-21
Payer: MEDICARE

## 2020-09-21 VITALS
HEIGHT: 63.5 IN | HEART RATE: 83 BPM | DIASTOLIC BLOOD PRESSURE: 80 MMHG | SYSTOLIC BLOOD PRESSURE: 120 MMHG | WEIGHT: 143 LBS | BODY MASS INDEX: 25.02 KG/M2 | OXYGEN SATURATION: 98 %

## 2020-09-21 DIAGNOSIS — J18.9 PNEUMONIA, UNSPECIFIED ORGANISM: ICD-10-CM

## 2020-09-21 PROCEDURE — 99214 OFFICE O/P EST MOD 30 MIN: CPT

## 2020-09-23 PROBLEM — J18.9 PNEUMONIA: Status: ACTIVE | Noted: 2020-08-21

## 2020-09-25 ENCOUNTER — APPOINTMENT (OUTPATIENT)
Dept: CARDIOLOGY | Facility: CLINIC | Age: 75
End: 2020-09-25
Payer: MEDICARE

## 2020-09-25 ENCOUNTER — NON-APPOINTMENT (OUTPATIENT)
Age: 75
End: 2020-09-25

## 2020-09-25 VITALS
HEIGHT: 63 IN | WEIGHT: 146 LBS | DIASTOLIC BLOOD PRESSURE: 80 MMHG | BODY MASS INDEX: 25.87 KG/M2 | SYSTOLIC BLOOD PRESSURE: 120 MMHG

## 2020-09-25 PROCEDURE — 93000 ELECTROCARDIOGRAM COMPLETE: CPT

## 2020-09-25 PROCEDURE — 99204 OFFICE O/P NEW MOD 45 MIN: CPT

## 2020-09-25 NOTE — HISTORY OF PRESENT ILLNESS
[FreeTextEntry1] : Dr. Jim\par \par 75-year-old female with CLL who underwent CAT scan found to have cardiomegaly. Patient complains of shortness of breath on minimal exertion. Her other symptoms also include bilateral leg numbness attributed to some form of peripheral neuropathy. Patient feels fatigued throughout the day she also complains of episodes of chest pain that are nonradiating. There is no temporal sequence to food and pain is not reproducible by palpation. She has noticed a significant decrease in exercise tolerance. She also states that she has asthma. She is unable to tolerate the treadmill.\par EKG normal sinus rhythm with anterior infarct

## 2020-09-25 NOTE — ASSESSMENT
[FreeTextEntry1] : 2-D echocardiogram to assess enlarged heart\par Pharmacologic stress test or shortness of breath and chest pain on exertion

## 2020-09-25 NOTE — PHYSICAL EXAM
[General Appearance - Well Developed] : well developed [Normal Appearance] : normal appearance [Well Groomed] : well groomed [General Appearance - Well Nourished] : well nourished [No Deformities] : no deformities [General Appearance - In No Acute Distress] : no acute distress [Normal Conjunctiva] : the conjunctiva exhibited no abnormalities [Eyelids - No Xanthelasma] : the eyelids demonstrated no xanthelasmas [Normal Oral Mucosa] : normal oral mucosa [No Oral Pallor] : no oral pallor [No Oral Cyanosis] : no oral cyanosis [Normal Jugular Venous A Waves Present] : normal jugular venous A waves present [Normal Jugular Venous V Waves Present] : normal jugular venous V waves present [No Jugular Venous Phillips A Waves] : no jugular venous phillips A waves [Heart Rate And Rhythm] : heart rate and rhythm were normal [Heart Sounds] : normal S1 and S2 [Murmurs] : no murmurs present [Respiration, Rhythm And Depth] : normal respiratory rhythm and effort [Exaggerated Use Of Accessory Muscles For Inspiration] : no accessory muscle use [Auscultation Breath Sounds / Voice Sounds] : lungs were clear to auscultation bilaterally [Abdomen Soft] : soft [Abdomen Tenderness] : non-tender [Abdomen Mass (___ Cm)] : no abdominal mass palpated [Abnormal Walk] : normal gait [Gait - Sufficient For Exercise Testing] : the gait was sufficient for exercise testing [Nail Clubbing] : no clubbing of the fingernails [Cyanosis, Localized] : no localized cyanosis [Petechial Hemorrhages (___cm)] : no petechial hemorrhages [Skin Color & Pigmentation] : normal skin color and pigmentation [] : no rash [No Venous Stasis] : no venous stasis [Skin Lesions] : no skin lesions [No Skin Ulcers] : no skin ulcer [No Xanthoma] : no  xanthoma was observed [Oriented To Time, Place, And Person] : oriented to person, place, and time [Affect] : the affect was normal [Mood] : the mood was normal [No Anxiety] : not feeling anxious

## 2020-09-29 ENCOUNTER — RESULT REVIEW (OUTPATIENT)
Age: 75
End: 2020-09-29

## 2020-10-02 ENCOUNTER — APPOINTMENT (OUTPATIENT)
Dept: CARDIOLOGY | Facility: CLINIC | Age: 75
End: 2020-10-02
Payer: MEDICARE

## 2020-10-02 VITALS
DIASTOLIC BLOOD PRESSURE: 70 MMHG | OXYGEN SATURATION: 98 % | RESPIRATION RATE: 20 BRPM | SYSTOLIC BLOOD PRESSURE: 120 MMHG | HEIGHT: 63 IN | WEIGHT: 146 LBS | HEART RATE: 90 BPM | BODY MASS INDEX: 25.87 KG/M2

## 2020-10-02 PROCEDURE — 99215 OFFICE O/P EST HI 40 MIN: CPT

## 2020-10-02 NOTE — ASSESSMENT
[FreeTextEntry1] : 2-D echocardiogram to assess enlarged heart - pending\par \par Pharmacologic stress test or shortness of breath and chest pain on exertion - mildly apical ischemia\par \par recommend cardiac ct\par Discussed risks and benefits of cardiac catheterization.\par Risks include but not limited to bleeding, infection, vascular compromise, stroke, heart attack, kidney failure, death.\par Benefits include revascularization and resolution of symptoms.\par \par total time > 40 min. \par

## 2020-10-02 NOTE — PHYSICAL EXAM
[General Appearance - Well Developed] : well developed [Normal Appearance] : normal appearance [Well Groomed] : well groomed [General Appearance - Well Nourished] : well nourished [No Deformities] : no deformities [General Appearance - In No Acute Distress] : no acute distress [Normal Conjunctiva] : the conjunctiva exhibited no abnormalities [Eyelids - No Xanthelasma] : the eyelids demonstrated no xanthelasmas [Normal Oral Mucosa] : normal oral mucosa [No Oral Pallor] : no oral pallor [No Oral Cyanosis] : no oral cyanosis [Normal Jugular Venous A Waves Present] : normal jugular venous A waves present [Normal Jugular Venous V Waves Present] : normal jugular venous V waves present [No Jugular Venous Phillips A Waves] : no jugular venous phillips A waves [Respiration, Rhythm And Depth] : normal respiratory rhythm and effort [Exaggerated Use Of Accessory Muscles For Inspiration] : no accessory muscle use [Auscultation Breath Sounds / Voice Sounds] : lungs were clear to auscultation bilaterally [Heart Rate And Rhythm] : heart rate and rhythm were normal [Heart Sounds] : normal S1 and S2 [Murmurs] : no murmurs present [Abdomen Soft] : soft [Abdomen Tenderness] : non-tender [Abdomen Mass (___ Cm)] : no abdominal mass palpated [Abnormal Walk] : normal gait [Gait - Sufficient For Exercise Testing] : the gait was sufficient for exercise testing [Nail Clubbing] : no clubbing of the fingernails [Cyanosis, Localized] : no localized cyanosis [Petechial Hemorrhages (___cm)] : no petechial hemorrhages [Skin Color & Pigmentation] : normal skin color and pigmentation [] : no rash [No Venous Stasis] : no venous stasis [Skin Lesions] : no skin lesions [No Skin Ulcers] : no skin ulcer [No Xanthoma] : no  xanthoma was observed [Oriented To Time, Place, And Person] : oriented to person, place, and time [Affect] : the affect was normal [Mood] : the mood was normal [No Anxiety] : not feeling anxious

## 2020-10-05 ENCOUNTER — RESULT REVIEW (OUTPATIENT)
Age: 75
End: 2020-10-05

## 2020-10-06 RX ORDER — METOPROLOL TARTRATE 100 MG/1
100 TABLET, FILM COATED ORAL
Qty: 2 | Refills: 0 | Status: COMPLETED | COMMUNITY
Start: 2020-10-06 | End: 2020-10-28

## 2020-10-13 ENCOUNTER — RESULT REVIEW (OUTPATIENT)
Age: 75
End: 2020-10-13

## 2020-10-28 ENCOUNTER — APPOINTMENT (OUTPATIENT)
Dept: OBGYN | Facility: CLINIC | Age: 75
End: 2020-10-28
Payer: MEDICARE

## 2020-10-28 ENCOUNTER — NON-APPOINTMENT (OUTPATIENT)
Age: 75
End: 2020-10-28

## 2020-10-28 ENCOUNTER — LABORATORY RESULT (OUTPATIENT)
Age: 75
End: 2020-10-28

## 2020-10-28 VITALS
HEIGHT: 63 IN | TEMPERATURE: 98.7 F | WEIGHT: 142 LBS | SYSTOLIC BLOOD PRESSURE: 130 MMHG | BODY MASS INDEX: 25.16 KG/M2 | DIASTOLIC BLOOD PRESSURE: 90 MMHG

## 2020-10-28 DIAGNOSIS — Z01.419 ENCOUNTER FOR GYNECOLOGICAL EXAMINATION (GENERAL) (ROUTINE) W/OUT ABNORMAL FINDINGS: ICD-10-CM

## 2020-10-28 DIAGNOSIS — Z12.4 ENCOUNTER FOR SCREENING FOR MALIGNANT NEOPLASM OF CERVIX: ICD-10-CM

## 2020-10-28 DIAGNOSIS — Z78.9 OTHER SPECIFIED HEALTH STATUS: ICD-10-CM

## 2020-10-28 DIAGNOSIS — Z12.39 ENCOUNTER FOR OTHER SCREENING FOR MALIGNANT NEOPLASM OF BREAST: ICD-10-CM

## 2020-10-28 DIAGNOSIS — N81.11 CYSTOCELE, MIDLINE: ICD-10-CM

## 2020-10-28 DIAGNOSIS — R92.2 INCONCLUSIVE MAMMOGRAM: ICD-10-CM

## 2020-10-28 PROCEDURE — G0101: CPT

## 2020-10-28 NOTE — PHYSICAL EXAM
[Appropriately responsive] : appropriately responsive [Alert] : alert [No Acute Distress] : no acute distress [No Lymphadenopathy] : no lymphadenopathy [Soft] : soft [Non-tender] : non-tender [Non-distended] : non-distended [No HSM] : No HSM [No Mass] : no mass [Oriented x3] : oriented x3 [FreeTextEntry3] : no masses/enlargement [FreeTextEntry6] : Examined sitting and recumbent - breast densities noted [Examination Of The Breasts] : a normal appearance [Breast Palpation Diffuse Fibrous Tissue Bilateral] : fibrocystic changes [No Masses] : no breast masses were palpable [No Lesions] : no lesions  [Labia Majora] : normal [Labia Minora] : normal [Atrophy] : atrophy [Cystocele] : a cystocele [No Bleeding] : There was no active vaginal bleeding [Normal] : normal [Normal Position] : in a normal position [Tenderness] : nontender [Enlarged ___ wks] : not enlarged [Mass ___ cm] : no uterine mass was palpated [Uterine Adnexae] : normal [FreeTextEntry5] : no masses/polyps/cmt [FreeTextEntry9] : deferred [FreeTextEntry8] : no pelvic masses

## 2020-10-28 NOTE — HISTORY OF PRESENT ILLNESS
[Patient reported mammogram was normal] : Patient reported mammogram was normal [Patient reported PAP Smear was normal] : Patient reported PAP Smear was normal [Patient reported bone density results were abnormal] : Patient reported bone density results were abnormal [Patient reported colonoscopy was abnormal] : Patient reported colonoscopy was abnormal [postmenopausal] : postmenopausal [N] : Patient is not sexually active [TextBox_4] : Ms Carmichael is a 76 yo  who presents for annual gynecological care\par Last annual was when she was 58 yo\par \par Reviewed medical, surgical and family history\par \par Biggest issue has been urinary incontinence\par She is under care with Dr Melgar -- urologist - planned to have anterior colporrhaphy, sling - does not want perineorrhaphy at this time so is discussing with surgeon\par Was using estradiol cream preoperatively\par No bleeding since menopause\par Not sexually active - partner passed away\par \par No change in bowel habits\par  [Mammogramdate] : ?? [PapSmeardate] : ??? [BoneDensityDate] : 2019 [TextBox_37] : osteopenia [ColonoscopyDate] : 2019 [TextBox_43] : recall 2022 [PGHxTotal] : 4 [[facility] : 4 [PGHxPremature] : 0 [PGHxAbortions] : 0 [[facility]] : 5 [PGHxMultBirths] : 1

## 2020-10-28 NOTE — PLAN
[FreeTextEntry1] : Annual gynecological care\par cervical cancer screening today since none since 58 yo\par mammo with ultrasound\par has surgery planned for cystocele and incontinence\par \par Answered questions\par if lower pelvic pressure continues after surgery - will consider a PUS\par \par Rocio Frederick MD, PhD\par

## 2020-10-28 NOTE — REVIEW OF SYSTEMS
[Fever] : no fever [Fatigue] : no fatigue [Night Sweats] : no night sweats [Dry Eyes] : no dry eyes [Eye Discharge] : no eye discharge [Dec Hearing] : no decreased hearing [Dyspnea] : no dyspnea [Cough] : no cough [SOB on Exertion] : no shortness of breath on exertion [Chest Pain] : no chest pain [Palpitations] : no palpitations [Abdominal Pain] : no abdominal pain [Constipation] : no constipation [Diarrhea] : diarrhea [Heartburn] : heartburn [Vomiting] : no vomiting [Melena] : no melena [Urgency] : urgency [Frequency] : frequency [Incontinence] : incontinence [Urethral Discharge] : no urethral discharge [Abn Vaginal bleeding] : no abnormal vaginal bleeding [CVA Pain] : no CVA pain [Genital Rash/Irritation] : no genital rash/irritation [Breast Pain] : no breast pain [Breast Lump] : no breast lump [Nipple Changes] : no nipple changes [Skin Lesion on Breast] : no skin lesion on breast [Skin Rash] : no skin rash [Mole Changes] : no mole changes [Headache] : no headache [Dizziness] : no dizziness [Anxiety] : no anxiety [Depression] : no depression [Deepening Voice] : no deepening voice [Feeling Weak] : not feeling weak [Easy Bleeding] : no easy bleeding [Easy Bruising] : no easy bruising [Negative] : Heme/Lymph

## 2020-10-29 ENCOUNTER — RESULT REVIEW (OUTPATIENT)
Age: 75
End: 2020-10-29

## 2020-10-30 LAB — HPV HIGH+LOW RISK DNA PNL CVX: DETECTED

## 2020-11-02 ENCOUNTER — TRANSCRIPTION ENCOUNTER (OUTPATIENT)
Age: 75
End: 2020-11-02

## 2020-11-02 LAB — CYTOLOGY CVX/VAG DOC THIN PREP: ABNORMAL

## 2020-11-04 ENCOUNTER — TRANSCRIPTION ENCOUNTER (OUTPATIENT)
Age: 75
End: 2020-11-04

## 2020-11-05 ENCOUNTER — TRANSCRIPTION ENCOUNTER (OUTPATIENT)
Age: 75
End: 2020-11-05

## 2020-12-07 ENCOUNTER — RX RENEWAL (OUTPATIENT)
Age: 75
End: 2020-12-07

## 2020-12-09 ENCOUNTER — RESULT REVIEW (OUTPATIENT)
Age: 75
End: 2020-12-09

## 2020-12-23 PROBLEM — Z01.419 ENCOUNTER FOR ANNUAL ROUTINE GYNECOLOGICAL EXAMINATION: Status: RESOLVED | Noted: 2019-10-29 | Resolved: 2020-12-23

## 2021-01-05 ENCOUNTER — APPOINTMENT (OUTPATIENT)
Dept: NEUROLOGY | Facility: CLINIC | Age: 76
End: 2021-01-05
Payer: MEDICARE

## 2021-01-05 ENCOUNTER — APPOINTMENT (OUTPATIENT)
Dept: HEMATOLOGY ONCOLOGY | Facility: CLINIC | Age: 76
End: 2021-01-05

## 2021-01-05 ENCOUNTER — APPOINTMENT (OUTPATIENT)
Dept: CARDIOLOGY | Facility: CLINIC | Age: 76
End: 2021-01-05

## 2021-01-05 PROCEDURE — 99214 OFFICE O/P EST MOD 30 MIN: CPT | Mod: 95

## 2021-01-06 RX ORDER — DOXYCYCLINE HYCLATE 100 MG/1
100 TABLET ORAL
Qty: 2 | Refills: 0 | Status: DISCONTINUED | COMMUNITY
Start: 2020-08-20 | End: 2021-01-06

## 2021-01-06 RX ORDER — PREGABALIN 50 MG/1
50 CAPSULE ORAL TWICE DAILY
Qty: 60 | Refills: 1 | Status: DISCONTINUED | COMMUNITY
Start: 2020-09-02 | End: 2021-01-06

## 2021-01-06 NOTE — ASSESSMENT
[FreeTextEntry1] : Off Lyrica.\par Will place her on Lamictal and a titrating fashion for her neuropathy.\par Tramadol twice daily as needed.\par Labs as outlined below.\par Three-month followup.\par \par If she has swelling issues again, I would consider trying her on Cymbalta. Would need to discuss with Dr. Epifanio Saba her psychiatrist ([phone])\par She will keep me posted. I will see her between 6 months.

## 2021-01-06 NOTE — PHYSICAL EXAM
[FreeTextEntry1] : Physical examination \par General: No acute distress, Awake, Alert. \par \par Mental status \par Awake, alert, and oriented to person, time and place, Normal attention span and concentration, Recent and remote memory intact, Language intact, Fund of knowledge intact. \par Cranial Nerves \par II: VFF \par III, IV, VI: PERRL, EOMI. ?mild left eyelid ptosis\par V: Facial sensation is normal on the right, dec on the left.\par VII: dec NL fold on the left\par VIII: Gross hearing is intact. \par IX, X: Palate is midline and elevates symmetrically. \par XI: Trapezius normal strength. \par XII: Tongue midline without atrophy or fasciculations. \par \par

## 2021-01-06 NOTE — HISTORY OF PRESENT ILLNESS
[Home] : at home, [unfilled] , at the time of the visit. [Medical Office: ([facility])___] : at the medical office located in  [Verbal consent obtained from patient] : the patient, [unfilled] [FreeTextEntry1] : 1/5/2021\par Patient being seen for follow up. She continues to have significant neuropathic pain. Discontinued Lyrica because she didn't think it was working. It did work initially. Increasing the dose didn't help her. She is trying to eat well and exercise regularly. She is walking. She describes numbness in her right leg up to her ankle. The left leg is numb up to the thigh. She had to massage it often. The tramadol with Tylenol has been beneficial and provides her 3 hours of relief at a time.\par With exercise balance has improved.\par \par She has been hesitant to add other medications but is willing to try Lamictal at this time.\par 9/2/2020\par The patient is here in followup. She is accompanied to this visit by her daughter. She continues to have significant numbness and tingling in her feet. She also has significant burning. Putting pressure on her feet makes it much worse especially when she walks.\par Patient has been isolating in her home stating her. Apparently she did have 4-5 tick bites. She started noticing excessive swelling involving her feet. Because of these and other symptoms which included a left facial droop she went to see her primary care physician. Dr. Elizondo removed a tick from her back. She was Lyme positive. She had developed some pleurisy. She was treated with doxycycline and Advil. Once her infection was treated, she noted swelling in her feet improved considerably. \par \par When I had first started on Lyrica, she noted tremendous improvement in her pain. She had been holding off the Lyrica because she thought that was causing her neuropathic symptoms and swollen feet.\par \par Gabapentin caused her headaches.\par \par Following closes with psychiatrist- currently taking Seroquel and Klonopin. Bipolar disease under good control.\par 2/4/2020\par Here in follow up. We discussed EMG results. She does have moderate peripheral neuropathy. Her strength is actually intact. She began Lyrica last week. Since beginning at, she notices at least a 50% improvement in her pain. She is able to walk better. Even the numbness is somewhat improved.\par \par 12/20/19\par This is a pleasant 74-year-old right-handed woman who is being seen in neurologic consultation for evaluation of numbness and pain in her feet. Patient states that the numbness and tingling has been present for greater than a year but of late has been getting very intense. She describes constantly having cold feet. There is intense tingling. There is a lot of pins and needles and pain in her feet. It is present day and night. It tends to bother her more at night because she cannot sleep. She is endorsing intense muscle spasms and cramps.She has been losing balance and falling more frequently. Patient also states that she has noticed her left eyelid drooping. She also has a facial droop and thinks it has been present for greater than a year. She does not recall any episode of Bell's palsy.

## 2021-01-06 NOTE — CONSULT LETTER
[Dear  ___] : Dear  [unfilled], [Courtesy Letter:] : I had the pleasure of seeing your patient, [unfilled], in my office today. [Please see my note below.] : Please see my note below. [FreeTextEntry3] : Sincerely,\par \par Jermaine Leary M.D.\par

## 2021-01-06 NOTE — DATA REVIEWED
[de-identified] : \par  [place] MRI Report             Final\par \par No Documents Attached\par \par \par \par \par   [facility]\par                                          [address]\par                                    [place], [place]  [address]\par                                        Department of Radiology\par                                             [phone]\par \par \par Patient Name:      JARAD RAMIREZ                Location:       [facility]\par Med Rec #:        [id number]                    Account #:      [id number]\par YOB: 1945                    Ordering:       Angel Leary MD\par Age: 74               Sex:    F                 Attending:      Angel Leary MD\par PCP:        NO PRIMARY CARE PHYSICIAN\par ______________________________________________________________________________________\par \par Exam Date:      12/24/19\par Exam:         MRI BRAIN WAW IC\par Order#:       MRI [id number]\par \par \par \par INDICATION: Left facial droop and left eyelid ptosis. Frequent falls.\par \par  TECHNIQUE: MRI of the brain was performed without and with contrast. 7 mL of Gadavist was\par administered intravenously. 0 mL was discarded.\par \par  COMPARISON: None.\par \par  FINDINGS:\par  There is no evidence of acute infarct, acute intracranial hemorrhage, mass effect or hydrocephalus.\par No extra-axial collection. Basal cisterns are patent.\par \par  No brain parenchymal signal abnormality.\par \par  There is no abnormal intracranial enhancement.\par \par  Ventricles and sulci are age-appropriate in size.\par \par  Major intracranial flow-voids are preserved.\par \par  Bilateral cataract surgery. The orbits, sellar and suprasellar structures, and craniocervical\par junction are otherwise unremarkable. Mucosal thickening involving the left maxillary sinus. Visu\par alized paranasal sinuses and mastoid air cells are otherwise clear.\par \par \par  IMPRESSION:\par  Unremarkable study.\par \par ***Electronically Signed ***\par -----------------------------------------------\par Ananth Masters MD              12/26/19 1415\par \par Dictated on 12/24/19 1455\par \par \par Report cc:  Angel Leary MD;\par \par  \par \par  Ordered by: ANGEL LEARY       Collected/Examined: 24Dec2019 01:18PM       \par Verified by: ANGEL LEARY 26Dec2019 02:37PM       \par  Result Communication: No patient communication needed at this time;\par Stage: Final       \par  Performed at: [facility]       Resulted: 24Dec2019 02:55PM       Last Updated: 26Dec2019 02:37PM       Accession: [id number]

## 2021-01-19 ENCOUNTER — APPOINTMENT (OUTPATIENT)
Dept: HEMATOLOGY ONCOLOGY | Facility: CLINIC | Age: 76
End: 2021-01-19
Payer: MEDICARE

## 2021-01-19 ENCOUNTER — RESULT REVIEW (OUTPATIENT)
Age: 76
End: 2021-01-19

## 2021-01-19 ENCOUNTER — NON-APPOINTMENT (OUTPATIENT)
Age: 76
End: 2021-01-19

## 2021-01-19 ENCOUNTER — APPOINTMENT (OUTPATIENT)
Dept: CARDIOLOGY | Facility: CLINIC | Age: 76
End: 2021-01-19
Payer: MEDICARE

## 2021-01-19 VITALS
SYSTOLIC BLOOD PRESSURE: 134 MMHG | TEMPERATURE: 96.6 F | RESPIRATION RATE: 18 BRPM | DIASTOLIC BLOOD PRESSURE: 76 MMHG | BODY MASS INDEX: 26.22 KG/M2 | HEIGHT: 63 IN | HEART RATE: 87 BPM | WEIGHT: 148 LBS | OXYGEN SATURATION: 97 %

## 2021-01-19 VITALS
SYSTOLIC BLOOD PRESSURE: 130 MMHG | WEIGHT: 150 LBS | DIASTOLIC BLOOD PRESSURE: 60 MMHG | HEIGHT: 63 IN | BODY MASS INDEX: 26.58 KG/M2

## 2021-01-19 DIAGNOSIS — D50.0 IRON DEFICIENCY ANEMIA SECONDARY TO BLOOD LOSS (CHRONIC): ICD-10-CM

## 2021-01-19 DIAGNOSIS — I51.7 CARDIOMEGALY: ICD-10-CM

## 2021-01-19 PROCEDURE — 99215 OFFICE O/P EST HI 40 MIN: CPT

## 2021-01-19 PROCEDURE — 93000 ELECTROCARDIOGRAM COMPLETE: CPT

## 2021-01-19 PROCEDURE — 99214 OFFICE O/P EST MOD 30 MIN: CPT

## 2021-01-19 NOTE — ASSESSMENT
[FreeTextEntry1] : 2-D echocardiogram oct 2020-normal LV size, EF 62%, mildly enlarged LA, echo free space pericardial fat pad. \par \par Pharmacologic stress test sept 20202 - mildly apical ischemia\par \par cardiac ct - oct 2020 minimal stenosis in prox LAD and D1, minimal ca on aortic valve. \par \par \par Pt with ongoing chest pain and palpitations\par zio patch and treadmill stress test. \par \par f/u 3 weeks

## 2021-01-19 NOTE — HISTORY OF PRESENT ILLNESS
[FreeTextEntry1] : Dr. Jim\par \par 75-year-old female with CLL who underwent CAT scan found to have cardiomegaly. Patient complains of shortness of breath on minimal exertion. Her other symptoms also include bilateral leg numbness attributed to some form of peripheral neuropathy. \par \par Patient feels fatigued throughout the day she also complains of episodes of chest pain that are nonradiating. There is no temporal sequence to food and pain is not reproducible by palpation. She has noticed a significant decrease in exercise tolerance. She also states that she has asthma. \par Still has episodes of chest pain. \par EKG normal sinus rhythm with anterior infarct

## 2021-01-20 NOTE — CONSULT LETTER
[Dear  ___] : Dear  [unfilled], [Courtesy Letter:] : I had the pleasure of seeing your patient, [unfilled], in my office today. [Please see my note below.] : Please see my note below. [Consult Closing:] : Thank you very much for allowing me to participate in the care of this patient.  If you have any questions, please do not hesitate to contact me. [Sincerely,] : Sincerely, [DrCamilo  ___] : Dr. KIMBROUGH [DrCamilo ___] : Dr. KIMBROUGH [FreeTextEntry3] : Ron Bennett MD, MPH\par Attending Physician\par Hematology Oncology\par [facility]\par [facility]\par

## 2021-01-20 NOTE — ASSESSMENT
[FreeTextEntry1] : Fatigue\par Chronic indigestion\par Although multifactorial, could be related to the combination of her CLL, follicular lymphoma (duodenal type)\par Again discussed about the indications treatment\par She has done a lot of reading about the treatment options and is willing to try treatment\par Discussed about the treatment options including rituximab +/- ibrutinib, Gazvya+calquence, venetoclax+/-gazvya\par I have reviewed the risks, benefits and side effects of chemotherapy with the patient. All questions were answered to satisfaction. Patient agrees to pursue the planned chemotherapy.\par We will obtain the restaging scans, hepatitis panel prior to starting treatment\par She does not want PET, as apparently she had copay with PET despite her medicare \par \par CLL \par Diagnosed in 2012\par Peripheral blood FISH showed 13q del (favorable prognosis)\par IgHV mutated (favorable prognosis)\par Previously cared for by Dr Selina Carver ([facility])\par CT 1/2017 without any lymphadenopathy\par Mcintyre stage 0, low risk\par \par She has extreme fatigue/ exhaustion - which is possibly multifactorial\par No fevers, chills, night sweats or weight loss\par Labs reviewed and discussed\par Has anemia, thrombocytopenia, leucocytosis related to CLL\par Palpable cervical lymphadenopathy\par \par Duodenal type follicular lymphoma\par Incidental finding on EGD\par PET/CT (7/1/19) - Left retropharyngeal Lymph node (SUV 6.7)\par Unlikely causing any symptoms\par Repeat EGD (2/2020) showed 3 plaques (max 2.5 cm) with biopsy cw duodenal type follicular lymphoma\par \par Neuropathy\par FOllows  with neurology\par \par COVID vaccine\par Discussed about lack of evidence guiding management in patients with CLL\par Advised to register on the [facility] website and get vaccine whenever she is eligible\par If patients are asymptomatic from a CLL standpoint, we would recommend holding B-cell depleting therapy until one month after completion of vaccination (both doses for mRNA vaccination).\par For small molecule therapy in symptomatic patients, we would recommend holding vaccination until 1 month after completion of treatment, once there is evidence of B-cell recovery (ALC = 1.0, B-cell count = 50 cells/ lymph by flow cytometry). When chronic therapy for symptomatic patients is required, vaccination should be considered, as it may still generate T-cell memory responses in the absence of B-cell recovery.\par \par Follow up in 2 weeks\par CBC, CMP, ferritin, LDH, Rowe code: FBCEL, BCLL

## 2021-01-20 NOTE — PHYSICAL EXAM
[Ambulatory and capable of all self care but unable to carry out any work activities] : Status 2- Ambulatory and capable of all self care but unable to carry out any work activities. Up and about more than 50% of waking hours [Normal] : affect appropriate [de-identified] : Palpable cervical lymphadenopathy

## 2021-01-20 NOTE — HISTORY OF PRESENT ILLNESS
[de-identified] : SHe is seen today for follow up.\par She is now back in [place], lives with her daughter\par \par Her grand daughter was diagnosed with COVID ~ November 2020, when patient lived with her\par Patient took utmost precautions and did not get the infection- tested negative three times\par \par She continues to be fatigued\par She is "annoyed" by Her indigestion. It has been chronic and she feel it is getting worse. SHe is going to see Dr Haley\par She also has worsening neuropathy- follows with Dr Leary\par  [de-identified] : 73 year old female presents today for CLL, referred by Dr. Jim.  \par \par Around 2012- She was not feeling well, weakness, fatigue, frequent infections- saw her PCP- who did blood work which showed elevated WBC- was referred to hematologist (Dr Selina Carver) - who diagnosed with CLL- she has been following with her since. Has intermittently had issues, which were not satisfactorily answered. \par She was last seen 4-5 years back. SHe saw Dr Monet another oncologist in the same practice in 2016 who did the FISH panel which showed del 13 q\par SHe decided to switch care and is here to establish care\par \par She continues to be tired. Has to sit after minimal activity\par She does not sleep very well - due to neuropathy - has had tingling numbness, burning bl feet x 3 years\par \par No fevers chills night sweats\par No fatigue, tiredness, apathy\par No appetite loss or weight loss/weight gain\par No chest pain, shortness of breath, palpitation, dizziness\par No abdominal pain, nausea, vomiting, diarrhea, constipation\par No bright red blood per rectum, hematemesis or melena\par No hematuria, dysuria, frequency, urgency\par No headaches, visual changes, focal weakness, tingling, numbness\par  \par s/p IV injectafer on 5/21/19; next dose today\par \par She feels dramatic improvement in her symptoms\par She has more energy, is more awake\par \par She saw Dr Austen Haley (GI) -scheduled for CT, \par Had EGD (5/19/19)- FINAL ENDOSCOPIC DIAGNOSES:\par 1.  Erosive esophagitis with grade A erosions x2 in a patient with a previous fundoplication surgery but intractable symptoms of reflux and regurgitation and occasional dysphagia.\par 2.  Patchy granularity in the descending duodenum, of uncertain significance.\par \par Repeat EGD with Dr Haley (2/5/20)\par 1.  Esophageal ulcer, moderately large hiatal hernia recurrence, and a slight narrowing at the gastroesophageal junction, which was dilated.\par 2.  Multifocal mucosal abnormality with 3 plaques minimum noted in the descending duodenum, and APC treatment was not felt to be an appropriate intervention on the basis of this exam.\par \par Pathology\par A. Lower descending duodenum, biopsy:\par      B-cell lymphoma, follicular type.  (See comment)\par B. Mid descending duodenum, biopsy:\par      B-cell lymphoma, follicular type, low-grade.\par C. Duodenum jejunal junction, biopsy:\par      B-cell lymphoma, follicular type, low-grade.\par D. Stomach, biopsy:\par      Mild chronic gastritis without activity.\par      A Giemsa stain is negative for H. pylori.\par

## 2021-01-25 ENCOUNTER — RESULT REVIEW (OUTPATIENT)
Age: 76
End: 2021-01-25

## 2021-01-28 ENCOUNTER — APPOINTMENT (OUTPATIENT)
Dept: INTERNAL MEDICINE | Facility: CLINIC | Age: 76
End: 2021-01-28

## 2021-01-29 ENCOUNTER — RESULT REVIEW (OUTPATIENT)
Age: 76
End: 2021-01-29

## 2021-02-05 ENCOUNTER — NON-APPOINTMENT (OUTPATIENT)
Age: 76
End: 2021-02-05

## 2021-02-05 ENCOUNTER — APPOINTMENT (OUTPATIENT)
Dept: HEMATOLOGY ONCOLOGY | Facility: CLINIC | Age: 76
End: 2021-02-05

## 2021-02-08 NOTE — PHYSICAL EXAM
[General Appearance - Well Developed] : well developed [Normal Appearance] : normal appearance [Well Groomed] : well groomed [General Appearance - Well Nourished] : well nourished [No Deformities] : no deformities [General Appearance - In No Acute Distress] : no acute distress [Normal Conjunctiva] : the conjunctiva exhibited no abnormalities [Eyelids - No Xanthelasma] : the eyelids demonstrated no xanthelasmas [Normal Oral Mucosa] : normal oral mucosa [No Oral Pallor] : no oral pallor [No Oral Cyanosis] : no oral cyanosis [Normal Jugular Venous A Waves Present] : normal jugular venous A waves present [Normal Jugular Venous V Waves Present] : normal jugular venous V waves present [No Jugular Venous Phillips A Waves] : no jugular venous phillips A waves [Respiration, Rhythm And Depth] : normal respiratory rhythm and effort [Exaggerated Use Of Accessory Muscles For Inspiration] : no accessory muscle use [Auscultation Breath Sounds / Voice Sounds] : lungs were clear to auscultation bilaterally [Heart Rate And Rhythm] : heart rate and rhythm were normal [Heart Sounds] : normal S1 and S2 [Murmurs] : no murmurs present [Abdomen Soft] : soft [Abdomen Tenderness] : non-tender [Abdomen Mass (___ Cm)] : no abdominal mass palpated [Abnormal Walk] : normal gait [Gait - Sufficient For Exercise Testing] : the gait was sufficient for exercise testing [Cyanosis, Localized] : no localized cyanosis [Nail Clubbing] : no clubbing of the fingernails [Petechial Hemorrhages (___cm)] : no petechial hemorrhages [Skin Color & Pigmentation] : normal skin color and pigmentation [] : no rash [No Venous Stasis] : no venous stasis [Skin Lesions] : no skin lesions [No Skin Ulcers] : no skin ulcer [No Xanthoma] : no  xanthoma was observed [Oriented To Time, Place, And Person] : oriented to person, place, and time [Affect] : the affect was normal [Mood] : the mood was normal [No Anxiety] : not feeling anxious

## 2021-02-09 ENCOUNTER — APPOINTMENT (OUTPATIENT)
Dept: CARDIOLOGY | Facility: CLINIC | Age: 76
End: 2021-02-09
Payer: MEDICARE

## 2021-02-09 PROCEDURE — 99213 OFFICE O/P EST LOW 20 MIN: CPT | Mod: 95

## 2021-02-09 NOTE — HISTORY OF PRESENT ILLNESS
[FreeTextEntry1] : Dr. Jim\par \par 75-year-old female with CLL who underwent CAT scan found to have cardiomegaly. Patient complains of shortness of breath on minimal exertion. Her other symptoms also include bilateral leg numbness attributed to some form of peripheral neuropathy. \par \par Patient feels fatigued throughout the day she also complains of episodes of chest pain that are nonradiating. There is no temporal sequence to food and pain is not reproducible by palpation. She has noticed a significant decrease in exercise tolerance. She also states that she has asthma. \par Still has episodes of chest pain. \par EKG normal sinus rhythm with anterior infarct\par \par Since last visit - still ahs chest pain.

## 2021-02-11 ENCOUNTER — LABORATORY RESULT (OUTPATIENT)
Age: 76
End: 2021-02-11

## 2021-02-11 ENCOUNTER — APPOINTMENT (OUTPATIENT)
Dept: INTERNAL MEDICINE | Facility: CLINIC | Age: 76
End: 2021-02-11
Payer: MEDICARE

## 2021-02-11 VITALS
DIASTOLIC BLOOD PRESSURE: 80 MMHG | BODY MASS INDEX: 25.51 KG/M2 | OXYGEN SATURATION: 98 % | SYSTOLIC BLOOD PRESSURE: 130 MMHG | TEMPERATURE: 97.8 F | WEIGHT: 144 LBS | HEART RATE: 86 BPM

## 2021-02-11 PROCEDURE — 99213 OFFICE O/P EST LOW 20 MIN: CPT | Mod: 25

## 2021-02-11 PROCEDURE — 36415 COLL VENOUS BLD VENIPUNCTURE: CPT

## 2021-02-11 NOTE — PHYSICAL EXAM
[Normal Sclera/Conjunctiva] : normal sclera/conjunctiva [Normal Outer Ear/Nose] : the outer ears and nose were normal in appearance [Pedal Pulses Present] : the pedal pulses are present [No Edema] : there was no peripheral edema [Normal Gait] : normal gait [Normal] : affect was normal and insight and judgment were intact [de-identified] : MCP joints of hands slightly swollen.  Right patella in the region where patient felt the deformity is totally normal, as is left patella.

## 2021-02-11 NOTE — HISTORY OF PRESENT ILLNESS
[FreeTextEntry1] : 75-year-old female here for a Lyme titer and several other medical issues. [de-identified] : Patient was treated by me for Lyme disease last summer because of a tick that was found during examination and report by her that she frequently gets tick bites, followed by a positive Lyme test indicating a recent infection.  She took doxycycline for 3 weeks without any side effects or problems.  She is followed by neurologist for lower extremity neuropathy, and the neurologist wants her to be retested for Lyme disease as a possible cause of her neuropathy.\par Patient is status post bilateral knee replacements and feels that she has some bony abnormality on the right knee.  Her attention was drawn to this recently because of a small black and blue codi just above the right knee.  She has no history of trauma, even minor, but is followed by hematologist for chronic lymphocytic leukemia and has a low platelet count.  Her most recent count, however, was 126,000, which was done less than 1 month ago.\par Patient also suffers from chronic arthritis and has noticed gradual swelling and deformity of the joints of her hands and feet.

## 2021-02-11 NOTE — ASSESSMENT
[FreeTextEntry1] : Plan as above.\par \par The patient was advised to call for any problems or questions.\par \par The patient will follow up with her own PCP as previously discussed.

## 2021-02-11 NOTE — REVIEW OF SYSTEMS
[Negative] : Gastrointestinal [FreeTextEntry9] : See HPI. [de-identified] : See HPI. [de-identified] : See HPI.

## 2021-02-16 LAB — B BURGDOR IGG+IGM SER QL IB: NORMAL

## 2021-03-01 ENCOUNTER — TRANSCRIPTION ENCOUNTER (OUTPATIENT)
Age: 76
End: 2021-03-01

## 2021-03-01 ENCOUNTER — APPOINTMENT (OUTPATIENT)
Dept: NEUROLOGY | Facility: CLINIC | Age: 76
End: 2021-03-01

## 2021-03-18 ENCOUNTER — APPOINTMENT (OUTPATIENT)
Dept: HEMATOLOGY ONCOLOGY | Facility: CLINIC | Age: 76
End: 2021-03-18

## 2021-03-18 ENCOUNTER — APPOINTMENT (OUTPATIENT)
Dept: CARDIOLOGY | Facility: CLINIC | Age: 76
End: 2021-03-18

## 2021-04-05 ENCOUNTER — APPOINTMENT (OUTPATIENT)
Dept: NEUROLOGY | Facility: CLINIC | Age: 76
End: 2021-04-05

## 2021-06-16 ENCOUNTER — APPOINTMENT (OUTPATIENT)
Dept: NEUROLOGY | Facility: CLINIC | Age: 76
End: 2021-06-16

## 2021-06-17 ENCOUNTER — RESULT REVIEW (OUTPATIENT)
Age: 76
End: 2021-06-17

## 2021-06-17 ENCOUNTER — APPOINTMENT (OUTPATIENT)
Dept: HEMATOLOGY ONCOLOGY | Facility: CLINIC | Age: 76
End: 2021-06-17
Payer: MEDICARE

## 2021-06-17 ENCOUNTER — APPOINTMENT (OUTPATIENT)
Dept: GASTROENTEROLOGY | Facility: CLINIC | Age: 76
End: 2021-06-17
Payer: MEDICARE

## 2021-06-17 VITALS
TEMPERATURE: 97.9 F | DIASTOLIC BLOOD PRESSURE: 75 MMHG | WEIGHT: 140 LBS | HEIGHT: 63 IN | OXYGEN SATURATION: 98 % | RESPIRATION RATE: 16 BRPM | BODY MASS INDEX: 24.8 KG/M2 | HEART RATE: 94 BPM | SYSTOLIC BLOOD PRESSURE: 124 MMHG

## 2021-06-17 VITALS
SYSTOLIC BLOOD PRESSURE: 128 MMHG | WEIGHT: 140 LBS | HEART RATE: 72 BPM | HEIGHT: 63 IN | DIASTOLIC BLOOD PRESSURE: 70 MMHG | BODY MASS INDEX: 24.8 KG/M2

## 2021-06-17 DIAGNOSIS — R32 UNSPECIFIED URINARY INCONTINENCE: ICD-10-CM

## 2021-06-17 PROCEDURE — 99213 OFFICE O/P EST LOW 20 MIN: CPT

## 2021-06-17 PROCEDURE — 99214 OFFICE O/P EST MOD 30 MIN: CPT

## 2021-06-17 RX ORDER — QUETIAPINE FUMARATE 50 MG/1
50 TABLET ORAL
Qty: 30 | Refills: 0 | Status: DISCONTINUED | COMMUNITY
Start: 2018-12-03 | End: 2021-06-17

## 2021-06-17 RX ORDER — TRAMADOL HYDROCHLORIDE AND ACETAMINOPHEN 37.5; 325 MG/1; MG/1
37.5-325 TABLET, FILM COATED ORAL TWICE DAILY
Qty: 60 | Refills: 0 | Status: DISCONTINUED | COMMUNITY
Start: 2020-10-28 | End: 2021-06-17

## 2021-06-17 RX ORDER — LAMOTRIGINE 25 MG/1
25 TABLET ORAL
Qty: 60 | Refills: 1 | Status: DISCONTINUED | COMMUNITY
Start: 2020-10-01 | End: 2021-06-17

## 2021-06-17 NOTE — ASSESSMENT
[FreeTextEntry1] : we are going to schedule the patient for an egd and possible dilatation of stricture at the ge junction\par for assessment of her esophagitis, \par for assessment of the plaques in the descending uodenum\par \par for consideration of doing a duodenoscopy with long scope, based on other findings\par \par Dr Bennett has requested endoscopic re evaluation of the degree of follicular cell lymphoma implants in the  duodenum\par \par patient is on Pantoprazole\par i had advised that she try  Dexilant, but insurance would not pay, and it was too much money\par \par More than 50% of the face to face time was devoted to counseling and /or coordination of care.  THis coordination of care may have included reviewing other medical notes and reports, and communicating with other health professionals\par

## 2021-06-17 NOTE — HISTORY OF PRESENT ILLNESS
[FreeTextEntry1] : in office visit\par \par problems as below\par \par 1.  follicular lymphoma of duodenum\par \par 2.  hiatal hernia repair, x1;  with Nissen's fundoplication, question raised of repeat surgery\par \par 3.  symptoms of esophageal reflux, recurrent;  \par worst food as a trigger; broccoli\par red sauce is the second worse\par oj is avoided, with citrus\par caffeine is avoided\par chocolate; no problem\par no drinking \par \par 4. hx of ulcer 1.2 cm ulceration just above the ge junction, last egd feb 2020\par \par 5.  patient is being followed for her follicular lymphoma, which i discovered during egd of late 2019, when doing an egd for reflux, and biopsies confirmed diagnosis\par \par i performed balloon dilatation in feb 2020, helped her swallow better after this was performed..\par \par this procedure helped patients swallowing for a while, but the dysphagia has returned recently, perhaps within the last two months

## 2021-06-18 ENCOUNTER — NON-APPOINTMENT (OUTPATIENT)
Age: 76
End: 2021-06-18

## 2021-06-18 PROBLEM — R32 URINARY INCONTINENCE IN FEMALE: Status: ACTIVE | Noted: 2020-10-28

## 2021-06-18 NOTE — CONSULT LETTER
[Dear  ___] : Dear  [unfilled], [Courtesy Letter:] : I had the pleasure of seeing your patient, [unfilled], in my office today. [Please see my note below.] : Please see my note below. [Consult Closing:] : Thank you very much for allowing me to participate in the care of this patient.  If you have any questions, please do not hesitate to contact me. [Sincerely,] : Sincerely, [DrCamilo  ___] : Dr. KIMBROUGH [DrCamilo ___] : Dr. KIMBROUGH [FreeTextEntry3] : Ron Bennett MD, MPH\par Attending Physician\par Hematology Oncology\par [facility] [place]\par [facility]\par

## 2021-06-18 NOTE — RESULTS/DATA
[FreeTextEntry1] : Labs reviewed, analyzed and discussed\par \par 6/17/21 wbc 50.3 hgb 11.0 hct 33.5 plts 100

## 2021-06-18 NOTE — HISTORY OF PRESENT ILLNESS
[de-identified] : Patient has  seen was in Feb 2021 due to recovering from strep throat and lyme infections. She has seen neurologist for chronic painful neuropathy on her lower ext with no abnormal findings, still taking Tramadol prn. \par Her urinary incontinence has gotten worse, she wants to do the bladder sling before proceed with any chemo treatment. \par s/p Modern vacinne in March 2021. \par She's doing well. Denies weight loss, fever, SOB/ARRIAGA, chest pain, palpitation, rash, bleeding. \par  [de-identified] : 73 year old female presents today for CLL, referred by Dr. Jim.  \par \par Around 2012- She was not feeling well, weakness, fatigue, frequent infections- saw her PCP- who did blood work which showed elevated WBC- was referred to hematologist (Dr Selina Carver) - who diagnosed with CLL- she has been following with her since. Has intermittently had issues, which were not satisfactorily answered. \par She was last seen 4-5 years back. SHe saw Dr Monet another oncologist in the same practice in 2016 who did the FISH panel which showed del 13 q\par SHe decided to switch care and is here to establish care\par \par She continues to be tired. Has to sit after minimal activity\par She does not sleep very well - due to neuropathy - has had tingling numbness, burning bl feet x 3 years\par \par No fevers chills night sweats\par No fatigue, tiredness, apathy\par No appetite loss or weight loss/weight gain\par No chest pain, shortness of breath, palpitation, dizziness\par No abdominal pain, nausea, vomiting, diarrhea, constipation\par No bright red blood per rectum, hematemesis or melena\par No hematuria, dysuria, frequency, urgency\par No headaches, visual changes, focal weakness, tingling, numbness\par  \par s/p IV injectafer on 5/21/19; next dose today\par \par She feels dramatic improvement in her symptoms\par She has more energy, is more awake\par \par She saw Dr Austen Haley (GI) -scheduled for CT, \par Had EGD (5/19/19)- FINAL ENDOSCOPIC DIAGNOSES:\par 1.  Erosive esophagitis with grade A erosions x2 in a patient with a previous fundoplication surgery but intractable symptoms of reflux and regurgitation and occasional dysphagia.\par 2.  Patchy granularity in the descending duodenum, of uncertain significance.\par \par Repeat EGD with Dr Haley (2/5/20)\par 1.  Esophageal ulcer, moderately large hiatal hernia recurrence, and a slight narrowing at the gastroesophageal junction, which was dilated.\par 2.  Multifocal mucosal abnormality with 3 plaques minimum noted in the descending duodenum, and APC treatment was not felt to be an appropriate intervention on the basis of this exam.\par \par Pathology\par A. Lower descending duodenum, biopsy:\par      B-cell lymphoma, follicular type.  (See comment)\par B. Mid descending duodenum, biopsy:\par      B-cell lymphoma, follicular type, low-grade.\par C. Duodenum jejunal junction, biopsy:\par      B-cell lymphoma, follicular type, low-grade.\par D. Stomach, biopsy:\par      Mild chronic gastritis without activity.\par      A Giemsa stain is negative for H. pylori.\par

## 2021-06-18 NOTE — PHYSICAL EXAM
[Ambulatory and capable of all self care but unable to carry out any work activities] : Status 2- Ambulatory and capable of all self care but unable to carry out any work activities. Up and about more than 50% of waking hours [Normal] : affect appropriate [de-identified] : palpable cervical lymphadenopathy

## 2021-06-18 NOTE — ASSESSMENT
[FreeTextEntry1] : #CLL - now with chronic fatigue \par Diagnosed in 2012\par Peripheral blood FISH showed 13q del (favorable prognosis)\par IgHV mutated (favorable prognosis)\par Previously cared for by Dr Selina Carver ([facility])\par CT 1/2017 without any lymphadenopathy\par Mcintyre stage 0, low risk\par \par #She has extreme fatigue/ exhaustion - which is possibly multifactorial\par No fevers, chills, night sweats or weight loss\par Labs reviewed and discussed\par Has anemia, thrombocytopenia, leucocytosis related to CLL\par Palpable cervical lymphadenopathy\par #Discussed about the treatment options including rituximab +/- ibrutinib, Gazvya+calquence, venetoclax+/-gazvya in Feb visit and patient's agreeing to treatment. \par She'll do another restaging CT scan and decide but if she is to proceed with treatment, she wants to do it after full recover from her bladder sling. \par \par #Duodenal type follicular lymphoma\par Incidental finding on EGD\par PET/CT (7/1/19) - Left retropharyngeal Lymph node (SUV 6.7)\par Unlikely causing any symptoms\par Repeat EGD (2/2020) showed 3 plaques (max 2.5 cm) with biopsy cw duodenal type follicular lymphoma\par \par #urinary incontinence\par Will reach out to her urologist for bladder sling surgery. \par \par #Neuropathy\par FOllows  with neurology\par \par s/p Moderna Vaccine in March 2021. \par \par Follow up in 2-3  weeks to go over CT scan and treatment plan. \par CBC, CMP, LDH

## 2021-06-22 ENCOUNTER — RESULT REVIEW (OUTPATIENT)
Age: 76
End: 2021-06-22

## 2021-06-23 ENCOUNTER — APPOINTMENT (OUTPATIENT)
Dept: GASTROENTEROLOGY | Facility: HOSPITAL | Age: 76
End: 2021-06-23

## 2021-06-23 DIAGNOSIS — Z11.59 ENCOUNTER FOR SCREENING FOR OTHER VIRAL DISEASES: ICD-10-CM

## 2021-06-25 ENCOUNTER — APPOINTMENT (OUTPATIENT)
Dept: HEMATOLOGY ONCOLOGY | Facility: CLINIC | Age: 76
End: 2021-06-25

## 2021-06-25 ENCOUNTER — RESULT REVIEW (OUTPATIENT)
Age: 76
End: 2021-06-25

## 2021-06-25 ENCOUNTER — APPOINTMENT (OUTPATIENT)
Dept: INTERNAL MEDICINE | Facility: CLINIC | Age: 76
End: 2021-06-25

## 2021-06-25 VITALS
BODY MASS INDEX: 24.34 KG/M2 | OXYGEN SATURATION: 97 % | SYSTOLIC BLOOD PRESSURE: 128 MMHG | HEART RATE: 77 BPM | TEMPERATURE: 97.6 F | HEIGHT: 63 IN | DIASTOLIC BLOOD PRESSURE: 72 MMHG | WEIGHT: 137.4 LBS | RESPIRATION RATE: 16 BRPM

## 2021-06-28 ENCOUNTER — RESULT REVIEW (OUTPATIENT)
Age: 76
End: 2021-06-28

## 2021-06-30 ENCOUNTER — APPOINTMENT (OUTPATIENT)
Dept: PAIN MANAGEMENT | Facility: CLINIC | Age: 76
End: 2021-06-30
Payer: MEDICARE

## 2021-06-30 VITALS
WEIGHT: 137 LBS | TEMPERATURE: 98 F | DIASTOLIC BLOOD PRESSURE: 73 MMHG | HEIGHT: 63 IN | SYSTOLIC BLOOD PRESSURE: 118 MMHG | BODY MASS INDEX: 24.27 KG/M2

## 2021-06-30 PROCEDURE — 99204 OFFICE O/P NEW MOD 45 MIN: CPT

## 2021-06-30 NOTE — CONSULT LETTER
[Dear  ___] : Dear  [unfilled], [Consult Letter:] : I had the pleasure of evaluating your patient, [unfilled]. [Please see my note below.] : Please see my note below. [Consult Closing:] : Thank you very much for allowing me to participate in the care of this patient.  If you have any questions, please do not hesitate to contact me. [Sincerely,] : Sincerely, [FreeTextEntry3] : Rush Cochran MD\par

## 2021-06-30 NOTE — ASSESSMENT
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 \par \par The patient has failed to have relief with over six weeks of physical therapy within the last three months and all medications. GIven their failure to improve with all other conservative measures recommend MRI lumbar spine. Patient will return to review imaging and plan for potential intervention.\par \par The patient has failed to have relief with medication management and physical therapy. Given the patients failure to improve with all other conservative measures, recommend spinal cord stimulator trial under fluoroscopic guidance. If significant relief proceed to implant.\par \par Continue excellent care w/ Dr. Leary.\par

## 2021-06-30 NOTE — REVIEW OF SYSTEMS
[Abdominal Pain] : abdominal pain [Back Pain] : back pain [Joint Pain] : joint pain [Joint Stiffness] : joint stiffness [Numbness] : numbness [Negative] : Heme/Lymph

## 2021-06-30 NOTE — HISTORY OF PRESENT ILLNESS
[8] : 3. What number best describes how, during the past week, pain has interfered with your general activity? 8/10 pain [___ yrs] : [unfilled] year(s) ago [Constant] : constant [9] : a maximum pain level of 9/10 [Sharp] : sharp [Aching] : aching [Throbbing] : throbbing [Burning] : burning [Electric] : electric [Medications] : medications [FreeTextEntry1] : 76 yof presents today for consultation with bilateral foot and leg pain. She has numbness and pain in her feet. She is endorsing intense muscle spasms and cramps.She has been losing balance and falling more frequently. She uses tramadol 50 mg prn. She had side effects w/ gabapentin and no relief w/ Lyrica. Her Lyme disease has resolved. Left leg is worse then the right. Left leg goes up to the upper leg. Pain stops at the right knee level. Also has pain in her left wrist. Dr. Leary manages her peripheral neuropathy. She has seen Dr. 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

## 2021-07-02 ENCOUNTER — RESULT REVIEW (OUTPATIENT)
Age: 76
End: 2021-07-02

## 2021-07-02 ENCOUNTER — APPOINTMENT (OUTPATIENT)
Dept: HEMATOLOGY ONCOLOGY | Facility: CLINIC | Age: 76
End: 2021-07-02
Payer: MEDICARE

## 2021-07-02 VITALS
HEIGHT: 62.99 IN | BODY MASS INDEX: 24.5 KG/M2 | DIASTOLIC BLOOD PRESSURE: 60 MMHG | WEIGHT: 138.3 LBS | HEART RATE: 86 BPM | OXYGEN SATURATION: 98 % | TEMPERATURE: 97.6 F | RESPIRATION RATE: 16 BRPM | SYSTOLIC BLOOD PRESSURE: 123 MMHG

## 2021-07-02 PROCEDURE — 99215 OFFICE O/P EST HI 40 MIN: CPT

## 2021-07-02 NOTE — RESULTS/DATA
[FreeTextEntry1] : Labs reviewed, analyzed and discussed\par \par HCV RNA (1/8/2019-> Weakly reactive

## 2021-07-02 NOTE — PHYSICAL EXAM
[Ambulatory and capable of all self care but unable to carry out any work activities] : Status 2- Ambulatory and capable of all self care but unable to carry out any work activities. Up and about more than 50% of waking hours [Normal] : affect appropriate [de-identified] : palpable cervical lymphadenopathy

## 2021-07-02 NOTE — ASSESSMENT
[FreeTextEntry1] : #CLL - now with chronic fatigue \par Diagnosed in 2012\par Peripheral blood FISH showed 13q del (favorable prognosis)\par IgHV mutated (favorable prognosis)\par Previously cared for by Dr Selina Carver ([facility])\par CT 1/2017 without any lymphadenopathy\par Mcintyre stage 0, low risk\par She has extreme fatigue/ exhaustion - which is possibly multifactorial\par No fevers, chills, night sweats or weight loss\par Labs reviewed and discussed\par Has anemia, thrombocytopenia, leucocytosis related to CLL\par CT shows slightly increase in the size of lymphadenopathy\par Discussed about the treatment options including rituximab +/- ibrutinib, Gazvya+calquence, venetoclax+/-gazvya\par She has multiple issues going on and wants to try rituximab weekly x 4 first. Also does not like swallowing pills\par Repeat hepatitis panel given the ? Hep C weakly reactive in the past\par Schedule rituximab 375 mg/m2 once a week x 4\par Depending on response will consider adding BTK-I\par \par #Duodenal type follicular lymphoma\par Incidental finding on EGD\par PET/CT (7/1/19) - Left retropharyngeal Lymph node (SUV 6.7)\par Unlikely causing any symptoms\par Repeat EGD (2/2020) showed 3 plaques (max 2.5 cm) with biopsy cw duodenal type follicular lymphoma\par Explained that rituximab may benefit \par \par #urinary incontinence\par Saw urology and scheduled bladder sling surgery. \par \par #Neuropathy\par FOllows  with neurology\par \par s/p Moderna Vaccine in March 2021. \par \par Follow up in 2-3  weeks for rituximab #1\par CBC, CMP, LDH

## 2021-07-02 NOTE — CONSULT LETTER
[Dear  ___] : Dear  [unfilled], [Courtesy Letter:] : I had the pleasure of seeing your patient, [unfilled], in my office today. [Please see my note below.] : Please see my note below. [Consult Closing:] : Thank you very much for allowing me to participate in the care of this patient.  If you have any questions, please do not hesitate to contact me. [Sincerely,] : Sincerely, [DrCamilo  ___] : Dr. KIMBROUGH [DrCamilo ___] : Dr. KIMBROUGH [FreeTextEntry3] : Ron Bennett MD, MPH\par Attending Physician\par Hematology Oncology\par [facility] [place]\par [facility]\par

## 2021-07-03 ENCOUNTER — RX RENEWAL (OUTPATIENT)
Age: 76
End: 2021-07-03

## 2021-07-09 ENCOUNTER — NON-APPOINTMENT (OUTPATIENT)
Age: 76
End: 2021-07-09

## 2021-07-09 ENCOUNTER — RESULT REVIEW (OUTPATIENT)
Age: 76
End: 2021-07-09

## 2021-07-11 ENCOUNTER — RESULT CHARGE (OUTPATIENT)
Age: 76
End: 2021-07-11

## 2021-07-12 ENCOUNTER — APPOINTMENT (OUTPATIENT)
Dept: NEUROLOGY | Facility: CLINIC | Age: 76
End: 2021-07-12
Payer: MEDICARE

## 2021-07-12 ENCOUNTER — APPOINTMENT (OUTPATIENT)
Dept: OBGYN | Facility: CLINIC | Age: 76
End: 2021-07-12
Payer: MEDICARE

## 2021-07-12 ENCOUNTER — RX RENEWAL (OUTPATIENT)
Age: 76
End: 2021-07-12

## 2021-07-12 ENCOUNTER — LABORATORY RESULT (OUTPATIENT)
Age: 76
End: 2021-07-12

## 2021-07-12 VITALS
TEMPERATURE: 97 F | WEIGHT: 136 LBS | DIASTOLIC BLOOD PRESSURE: 64 MMHG | HEART RATE: 83 BPM | SYSTOLIC BLOOD PRESSURE: 133 MMHG | HEIGHT: 60 IN | BODY MASS INDEX: 26.7 KG/M2

## 2021-07-12 VITALS
SYSTOLIC BLOOD PRESSURE: 130 MMHG | BODY MASS INDEX: 26.7 KG/M2 | HEIGHT: 60 IN | TEMPERATURE: 97.8 F | DIASTOLIC BLOOD PRESSURE: 70 MMHG | WEIGHT: 136 LBS

## 2021-07-12 DIAGNOSIS — B97.7 PAPILLOMAVIRUS AS THE CAUSE OF DISEASES CLASSIFIED ELSEWHERE: ICD-10-CM

## 2021-07-12 DIAGNOSIS — N95.2 POSTMENOPAUSAL ATROPHIC VAGINITIS: ICD-10-CM

## 2021-07-12 DIAGNOSIS — N89.8 OTHER SPECIFIED NONINFLAMMATORY DISORDERS OF VAGINA: ICD-10-CM

## 2021-07-12 PROCEDURE — 99214 OFFICE O/P EST MOD 30 MIN: CPT

## 2021-07-12 PROCEDURE — 81003 URINALYSIS AUTO W/O SCOPE: CPT | Mod: QW

## 2021-07-13 ENCOUNTER — NON-APPOINTMENT (OUTPATIENT)
Age: 76
End: 2021-07-13

## 2021-07-13 LAB — HPV HIGH+LOW RISK DNA PNL CVX: DETECTED

## 2021-07-13 NOTE — PHYSICAL EXAM
[Appropriately responsive] : appropriately responsive [Alert] : alert [No Acute Distress] : no acute distress [Soft] : soft [Non-tender] : non-tender [Non-distended] : non-distended [No HSM] : No HSM [No Mass] : no mass [Oriented x3] : oriented x3 [No Lesions] : no lesions  [Labia Majora] : normal [Labia Minora] : normal [Atrophy] : atrophy [Cystocele] : a cystocele [No Bleeding] : There was no active vaginal bleeding [Normal] : normal [Normal Position] : in a normal position [Tenderness] : nontender [Enlarged ___ wks] : not enlarged [Mass ___ cm] : no uterine mass was palpated [Uterine Adnexae] : normal [FreeTextEntry5] : no masses/polyps/cmt [FreeTextEntry9] : deferred [FreeTextEntry8] : no pelvic masses

## 2021-07-13 NOTE — REVIEW OF SYSTEMS
[Fever] : no fever [Fatigue] : no fatigue [Night Sweats] : no night sweats [Dry Eyes] : no dry eyes [Eye Discharge] : no eye discharge [Dec Hearing] : no decreased hearing [Dyspnea] : no dyspnea [Cough] : no cough [SOB on Exertion] : no shortness of breath on exertion [Chest Pain] : no chest pain [Palpitations] : no palpitations [Abdominal Pain] : no abdominal pain [Constipation] : no constipation [Diarrhea] : diarrhea [Heartburn] : heartburn [Vomiting] : no vomiting [Melena] : no melena [Urgency] : urgency [Frequency] : frequency [Incontinence] : incontinence [Urethral Discharge] : no urethral discharge [Abn Vaginal bleeding] : no abnormal vaginal bleeding [CVA Pain] : no CVA pain [Genital Rash/Irritation] : genital rash/irritation [Breast Pain] : no breast pain [Breast Lump] : no breast lump [Nipple Changes] : no nipple changes [Skin Lesion on Breast] : no skin lesion on breast [Skin Rash] : no skin rash [Mole Changes] : no mole changes [Headache] : no headache [Dizziness] : no dizziness [Anxiety] : no anxiety [Depression] : no depression [Deepening Voice] : no deepening voice [Feeling Weak] : not feeling weak [Easy Bleeding] : no easy bleeding [Easy Bruising] : no easy bruising [Negative] : Heme/Lymph

## 2021-07-13 NOTE — HISTORY OF PRESENT ILLNESS
[FreeTextEntry1] : Ms Carmichael is a 77 yo  who presents for vaginal irritation\par \par Also would like to repeat pap test give HPV positive\par knows that a little early (nov due)\par \par \par Reviewed interim changes in health\par She has a long history of incontinence\par has been seeing a urogyn and is scheduled for surgery\par continues to have vaginal irritation/dryness\par was prescribed estrace cream but too expensive - estrogen vaginal tablets are less expensive so would consider these\par \par lymphoma recurrence - starting chemo end of July [Patient reported mammogram was normal] : Patient reported mammogram was normal [Patient reported PAP Smear was abnormal] : Patient reported PAP Smear was abnormal [Patient reported bone density results were abnormal] : Patient reported bone density results were abnormal [Patient reported colonoscopy was abnormal] : Patient reported colonoscopy was abnormal [postmenopausal] : postmenopausal [N] : Patient is not sexually active [Mammogramdate] : 2020 [PapSmeardate] : 2020 [TextBox_31] : HPV positive [BoneDensityDate] : 2019 [TextBox_37] : osteopenia [ColonoscopyDate] : 2019 [TextBox_43] : recall 2022 [PGHxTotal] : 4 [[facility]] : 4 [PGHxPremature] : 0 [PGHxAbortions] : 0 [[facility]Living] : 5 [PGHxMultBirths] : 1

## 2021-07-13 NOTE — PLAN
[FreeTextEntry1] : Atrophic vagina\par Urinary incontinence\par Under care of Dr Melgar\par ultimately getting surgery\par will prescribe vaginal estrogen tablets\par \par HPV (+) pap test \par last pap test was in 60s\par repeat today\par if still positive - likely a colposcopy\par \par Answered all questions\par \par Rocio Frederick MD, PhD\par

## 2021-07-14 LAB
CANDIDA VAG CYTO: NOT DETECTED
G VAGINALIS+PREV SP MTYP VAG QL MICRO: NOT DETECTED
T VAGINALIS VAG QL WET PREP: NOT DETECTED

## 2021-07-15 ENCOUNTER — APPOINTMENT (OUTPATIENT)
Dept: PAIN MANAGEMENT | Facility: CLINIC | Age: 76
End: 2021-07-15
Payer: MEDICARE

## 2021-07-15 PROCEDURE — 99443: CPT | Mod: 95

## 2021-07-15 NOTE — HISTORY OF PRESENT ILLNESS
[Home] : at home, [unfilled] , at the time of the visit. [Medical Office: ([facility])___] : at the medical office located in  [Verbal consent obtained from patient] : the patient, [unfilled] [FreeTextEntry1] : 7/12/21\par Tana continues to have severe burning pain in her feet.  The pain is worse at night.  She is beginning chemotherapy on the 23rd for CLL.\par She also notes a lot of numbness in her left hand.  Finger brace made it worse.  There is a left ganglion cyst.  She has seen her hand surgeon.\par She has seen pain management and they recommend spinal cord stimulator.\par \par The pain is worse at night.  At present she is declining any intervention as her chemotherapy takes precedence over other interventions.\par \par She tolerates tramadol twice a day but only gets relief for 3 hours at a time.\par She could not tolerate Lamictal or Lyrica.  She did not do well on gabapentin.  Because of her other psychiatric diagnoses, we have to be careful about introduction of SSRIs or tricyclic antidepressants.\par \par 1/5/2021\par Patient being seen for follow up. She continues to have significant neuropathic pain. Discontinued Lyrica because she didn't think it was working. It did work initially. Increasing the dose didn't help her. She is trying to eat well and exercise regularly. She is walking. She describes numbness in her right leg up to her ankle. The left leg is numb up to the thigh. She had to massage it often. The tramadol with Tylenol has been beneficial and provides her 3 hours of relief at a time.\par With exercise balance has improved.\par \par She has been hesitant to add other medications but is willing to try Lamictal at this time.\par 9/2/2020\par The patient is here in followup. She is accompanied to this visit by her daughter. She continues to have significant numbness and tingling in her feet. She also has significant burning. Putting pressure on her feet makes it much worse especially when she walks.\par Patient has been isolating in her home stating her. Apparently she did have 4-5 tick bites. She started noticing excessive swelling involving her feet. Because of these and other symptoms which included a left facial droop she went to see her primary care physician. Dr. Elizondo removed a tick from her back. She was Lyme positive. She had developed some pleurisy. She was treated with doxycycline and Advil. Once her infection was treated, she noted swelling in her feet improved considerably. \par \par When I had first started on Lyrica, she noted tremendous improvement in her pain. She had been holding off the Lyrica because she thought that was causing her neuropathic symptoms and swollen feet.\par \par Gabapentin caused her headaches.\par \par Following closes with psychiatrist- currently taking Seroquel and Klonopin. Bipolar disease under good control.\par 2/4/2020\par Here in follow up. We discussed EMG results. She does have moderate peripheral neuropathy. Her strength is actually intact. She began Lyrica last week. Since beginning at, she notices at least a 50% improvement in her pain. She is able to walk better. Even the numbness is somewhat improved.\par \par 12/20/19\par This is a pleasant 74-year-old right-handed woman who is being seen in neurologic consultation for evaluation of numbness and pain in her feet. Patient states that the numbness and tingling has been present for greater than a year but of late has been getting very intense. She describes constantly having cold feet. There is intense tingling. There is a lot of pins and needles and pain in her feet. It is present day and night. It tends to bother her more at night because she cannot sleep. She is endorsing intense muscle spasms and cramps.She has been losing balance and falling more frequently. Patient also states that she has noticed her left eyelid drooping. She also has a facial droop and thinks it has been present for greater than a year. She does not recall any episode of Bell's palsy.

## 2021-07-15 NOTE — PHYSICAL EXAM
[FreeTextEntry1] : Physical examination \par General: No acute distress, Awake, Alert. \par \par Mental status \par Awake, alert, and oriented to person, time and place, Normal attention span and concentration, Recent and remote memory intact, Language intact, Fund of knowledge intact. \par Cranial Nerves \par II: VFF \par III, IV, VI: PERRL, EOMI. ?mild left eyelid ptosis\par V: Facial sensation is normal on the right, dec on the left.\par VII: dec NL fold on the left\par VIII: Gross hearing is intact. \par IX, X: Palate is midline and elevates symmetrically. \par XI: Trapezius normal strength. \par XII: Tongue midline without atrophy or fasciculations. \par \par Motor\par 5/5

## 2021-07-15 NOTE — DATA REVIEWED
[de-identified] : \par  [facility] MRI Report             Final\par \par No Documents Attached\par \par \par \par \par   [facility]\par                                          [address]\par                                    [place], [place]  [address]\par                                        Department of Radiology\par                                             [phone]\par \par \par Patient Name:      JARAD RAMIREZ                Location:       [facility]\par Med Rec #:        [id number]                    Account #:      [id number]\par YOB: 1945                    Ordering:       Angel Leary MD\par Age: 74               Sex:    F                 Attending:      Angel Leary MD\par PCP:        NO PRIMARY CARE PHYSICIAN\par ______________________________________________________________________________________\par \par Exam Date:      12/24/19\par Exam:         MRI BRAIN WAW IC\par Order#:       [id number]\par \par \par \par INDICATION: Left facial droop and left eyelid ptosis. Frequent falls.\par \par  TECHNIQUE: MRI of the brain was performed without and with contrast. 7 mL of Gadavist was\par administered intravenously. 0 mL was discarded.\par \par  COMPARISON: None.\par \par  FINDINGS:\par  There is no evidence of acute infarct, acute intracranial hemorrhage, mass effect or hydrocephalus.\par No extra-axial collection. Basal cisterns are patent.\par \par  No brain parenchymal signal abnormality.\par \par  There is no abnormal intracranial enhancement.\par \par  Ventricles and sulci are age-appropriate in size.\par \par  Major intracranial flow-voids are preserved.\par \par  Bilateral cataract surgery. The orbits, sellar and suprasellar structures, and craniocervical\par junction are otherwise unremarkable. Mucosal thickening involving the left maxillary sinus. Visu\par alized paranasal sinuses and mastoid air cells are otherwise clear.\par \par \par  IMPRESSION:\par  Unremarkable study.\par \par ***Electronically Signed ***\par -----------------------------------------------\par Ananth Masters MD              12/26/19 1415\par \par Dictated on 12/24/19 1455\par \par \par Report cc:  Angel Leary MD;\par \par  \par \par  Ordered by: ANGEL LEARY       Collected/Examined: 24Dec2019 01:18PM       \par Verified by: ANGEL LEARY 26Dec2019 02:37PM       \par  Result Communication: No patient communication needed at this time;\par Stage: Final       \par  Performed at: [facility]       Resulted: 24Dec2019 02:55PM       Last Updated: 26Dec2019 02:37PM       Accession: [id number]

## 2021-07-15 NOTE — ASSESSMENT
[FreeTextEntry1] : She will continue nonpharmacologic measures such as massaging using Epsom salt baths and exercise.  She is trying her very best from this perspective.  \par Tana is in a lot of pain.  I am going to increase her tramadol to 3 times a day.\par \par I would consider amitriptyline or Cymbalta but we will not do this without discussion with Dr. Is all kind.\par \par Encouraged her to follow-up with Dr. Clarke.\par \par \par If she has swelling issues again, I would consider trying her on Cymbalta. Would need to discuss with Dr. Epifanio Saba her psychiatrist ([phone])\par She will keep me posted. I will see her between 6 months.

## 2021-07-16 ENCOUNTER — TRANSCRIPTION ENCOUNTER (OUTPATIENT)
Age: 76
End: 2021-07-16

## 2021-07-16 LAB — CYTOLOGY CVX/VAG DOC THIN PREP: ABNORMAL

## 2021-07-19 ENCOUNTER — APPOINTMENT (OUTPATIENT)
Dept: PAIN MANAGEMENT | Facility: HOSPITAL | Age: 76
End: 2021-07-19

## 2021-07-20 ENCOUNTER — TRANSCRIPTION ENCOUNTER (OUTPATIENT)
Age: 76
End: 2021-07-20

## 2021-07-23 ENCOUNTER — APPOINTMENT (OUTPATIENT)
Dept: HEMATOLOGY ONCOLOGY | Facility: CLINIC | Age: 76
End: 2021-07-23
Payer: MEDICARE

## 2021-07-23 ENCOUNTER — RESULT REVIEW (OUTPATIENT)
Age: 76
End: 2021-07-23

## 2021-07-23 VITALS
OXYGEN SATURATION: 96 % | SYSTOLIC BLOOD PRESSURE: 118 MMHG | RESPIRATION RATE: 18 BRPM | TEMPERATURE: 97.1 F | DIASTOLIC BLOOD PRESSURE: 57 MMHG | BODY MASS INDEX: 27.09 KG/M2 | HEART RATE: 74 BPM | HEIGHT: 60 IN | WEIGHT: 138 LBS

## 2021-07-23 PROCEDURE — 99215 OFFICE O/P EST HI 40 MIN: CPT

## 2021-07-23 NOTE — HISTORY OF PRESENT ILLNESS
[de-identified] : Patient is seen today for follow up\par Accompanied by her daughter\par \par She continues to feel tired and fatigued\par Has problems swallowing\par She co pain from neuropathy\par \par s/p Modern vacinne in March 2021. \par \par  [de-identified] : 73 year old female presents today for CLL, referred by Dr. Jim.  \par \par Around 2012- She was not feeling well, weakness, fatigue, frequent infections- saw her PCP- who did blood work which showed elevated WBC- was referred to hematologist (Dr Selina Carver) - who diagnosed with CLL- she has been following with her since. Has intermittently had issues, which were not satisfactorily answered. \par She was last seen 4-5 years back. SHe saw Dr Monet another oncologist in the same practice in 2016 who did the FISH panel which showed del 13 q\par SHe decided to switch care and is here to establish care\par \par She continues to be tired. Has to sit after minimal activity\par She does not sleep very well - due to neuropathy - has had tingling numbness, burning bl feet x 3 years\par \par No fevers chills night sweats\par No fatigue, tiredness, apathy\par No appetite loss or weight loss/weight gain\par No chest pain, shortness of breath, palpitation, dizziness\par No abdominal pain, nausea, vomiting, diarrhea, constipation\par No bright red blood per rectum, hematemesis or melena\par No hematuria, dysuria, frequency, urgency\par No headaches, visual changes, focal weakness, tingling, numbness\par  \par s/p IV injectafer on 5/21/19; next dose today\par \par She feels dramatic improvement in her symptoms\par She has more energy, is more awake\par \par She saw Dr Austen Haley (GI) -scheduled for CT, \par Had EGD (5/19/19)- FINAL ENDOSCOPIC DIAGNOSES:\par 1.  Erosive esophagitis with grade A erosions x2 in a patient with a previous fundoplication surgery but intractable symptoms of reflux and regurgitation and occasional dysphagia.\par 2.  Patchy granularity in the descending duodenum, of uncertain significance.\par \par Repeat EGD with Dr Haley (2/5/20)\par 1.  Esophageal ulcer, moderately large hiatal hernia recurrence, and a slight narrowing at the gastroesophageal junction, which was dilated.\par 2.  Multifocal mucosal abnormality with 3 plaques minimum noted in the descending duodenum, and APC treatment was not felt to be an appropriate intervention on the basis of this exam.\par \par Pathology\par A. Lower descending duodenum, biopsy:\par      B-cell lymphoma, follicular type.  (See comment)\par B. Mid descending duodenum, biopsy:\par      B-cell lymphoma, follicular type, low-grade.\par C. Duodenum jejunal junction, biopsy:\par      B-cell lymphoma, follicular type, low-grade.\par D. Stomach, biopsy:\par      Mild chronic gastritis without activity.\par      A Giemsa stain is negative for H. pylori.\par \par CT neck, chest. abdomen and pelvis\par Large bilateral submandibular lymph nodes have mildly increased in size.\par No evidence of acute process.\par Splenomegaly likely unchanged as compared to prior exam\par No evidence of mass within the chest abdomen or pelvis.\par Stable 1 cm left axillary lymph node as compared to prior exam.\par No new adenopathy within the chest abdomen or pelvis\par Stable right upper lobe air cyst\par \par

## 2021-07-23 NOTE — PHYSICAL EXAM
[Ambulatory and capable of all self care but unable to carry out any work activities] : Status 2- Ambulatory and capable of all self care but unable to carry out any work activities. Up and about more than 50% of waking hours [Normal] : affect appropriate [de-identified] : palpable cervical lymphadenopathy

## 2021-07-23 NOTE — CONSULT LETTER
[Dear  ___] : Dear  [unfilled], [Courtesy Letter:] : I had the pleasure of seeing your patient, [unfilled], in my office today. [Please see my note below.] : Please see my note below. [Consult Closing:] : Thank you very much for allowing me to participate in the care of this patient.  If you have any questions, please do not hesitate to contact me. [Sincerely,] : Sincerely, [DrCamilo  ___] : Dr. KIMBROUGH [DrCamilo ___] : Dr. KIMBROUGH [FreeTextEntry3] : Ron Bennett MD, MPH\par Attending Physician\par Hematology Oncology\par [facility] [place]\par [facility]\par

## 2021-07-23 NOTE — ASSESSMENT
[FreeTextEntry1] : CLL \par Diagnosed in 2012\par Peripheral blood FISH showed 13q del (favorable prognosis)\par IgHV mutated (favorable prognosis)\par Previously cared for by Dr Selina Carver ([facility])\par CT 1/2017 without any lymphadenopathy\par Mcintyre stage 0, low risk\par \par Seen today for follow up\par Was scheduled for rituximab weekly x 4. Accompanied by her daughter.\par Different treatment options including rituximab +/- ibrutinib, Gazvya+calquence, venetoclax+/-gazvya discussed\par Patient now interested in calquence + gazvya\par I have reviewed the risks, benefits and side effects of chemotherapy with the patient. All questions were answered to satisfaction. Patient agrees to pursue the planned chemotherapy.\par Plan:\par Calquence 100 mg BID \par Gazvya x 6 cycles\par Allopurinol BID x 15 days - start along with calquence\par \par #Duodenal type follicular lymphoma\par Incidental finding on EGD\par PET/CT (7/1/19) - Left retropharyngeal Lymph node (SUV 6.7)\par Unlikely causing any symptoms\par Repeat EGD (2/2020) showed 3 plaques (max 2.5 cm) with biopsy cw duodenal type follicular lymphoma\par Explained that calquence/gazvya may benefit \par \par #urinary incontinence\par Saw urology and scheduled bladder sling surgery. \par \par #Neuropathy\par FOllows  with neurology\par \par s/p Moderna Vaccine in March 2021. \par \par Follow up in 2  weeks for gazvya C1D1\par Use labs from today

## 2021-07-29 LAB
BILIRUB UR QL STRIP: NORMAL
CLARITY UR: CLEAR
COLLECTION METHOD: NORMAL
GLUCOSE UR-MCNC: NORMAL
HCG UR QL: 0.2 EU/DL
HGB UR QL STRIP.AUTO: NORMAL
KETONES UR-MCNC: NORMAL
LEUKOCYTE ESTERASE UR QL STRIP: NORMAL
NITRITE UR QL STRIP: NORMAL
PH UR STRIP: 6
PROT UR STRIP-MCNC: NORMAL
SP GR UR STRIP: 1.02

## 2021-07-31 ENCOUNTER — RX RENEWAL (OUTPATIENT)
Age: 76
End: 2021-07-31

## 2021-08-05 ENCOUNTER — APPOINTMENT (OUTPATIENT)
Dept: HEMATOLOGY ONCOLOGY | Facility: CLINIC | Age: 76
End: 2021-08-05
Payer: MEDICARE

## 2021-08-05 ENCOUNTER — NON-APPOINTMENT (OUTPATIENT)
Age: 76
End: 2021-08-05

## 2021-08-05 VITALS
OXYGEN SATURATION: 97 % | HEART RATE: 79 BPM | HEIGHT: 60 IN | BODY MASS INDEX: 27.09 KG/M2 | RESPIRATION RATE: 18 BRPM | TEMPERATURE: 97.3 F | WEIGHT: 138 LBS

## 2021-08-05 DIAGNOSIS — R55 SYNCOPE AND COLLAPSE: ICD-10-CM

## 2021-08-05 PROCEDURE — 99214 OFFICE O/P EST MOD 30 MIN: CPT

## 2021-08-05 NOTE — PHYSICAL EXAM
[Ambulatory and capable of all self care but unable to carry out any work activities] : Status 2- Ambulatory and capable of all self care but unable to carry out any work activities. Up and about more than 50% of waking hours [Normal] : affect appropriate [de-identified] : palpable cervical lymphadenopathy

## 2021-08-05 NOTE — CONSULT LETTER
[Dear  ___] : Dear  [unfilled], [Courtesy Letter:] : I had the pleasure of seeing your patient, [unfilled], in my office today. [Please see my note below.] : Please see my note below. [Consult Closing:] : Thank you very much for allowing me to participate in the care of this patient.  If you have any questions, please do not hesitate to contact me. [Sincerely,] : Sincerely, [DrCamilo  ___] : Dr. KIMBROUGH [DrCamilo ___] : Dr. KIMBROUGH [FreeTextEntry3] : Ron Bennett MD, MPH\par Attending Physician\par Hematology Oncology\par [facility] [place]\par [facility]\par

## 2021-08-05 NOTE — HISTORY OF PRESENT ILLNESS
[de-identified] : Patient is seen today for follow up and C1D1 gazvya (8/5/21)\par Accompanied by her daughter\par \par Calquence was cost prohibitive nad patient declined \par She continues to feel tired and fatigued\par Has problems swallowing\par She co pain from neuropathy\par \par Addendum-\par few minutes after starting gazvya, patient had a feeling of having to defecate, suddenly became nauseous, hypotensive. Gazvya held and IV NS started. She got back to baseline and gazvya resumed without any issues\par Likely vasovagal\par  [de-identified] : 73 year old female presents today for CLL, referred by Dr. Jim.  \par \par Around 2012- She was not feeling well, weakness, fatigue, frequent infections- saw her PCP- who did blood work which showed elevated WBC- was referred to hematologist (Dr Selina Carver) - who diagnosed with CLL- she has been following with her since. Has intermittently had issues, which were not satisfactorily answered. \par She was last seen 4-5 years back. SHe saw Dr Monet another oncologist in the same practice in 2016 who did the FISH panel which showed del 13 q\par SHe decided to switch care and is here to establish care\par \par She continues to be tired. Has to sit after minimal activity\par She does not sleep very well - due to neuropathy - has had tingling numbness, burning bl feet x 3 years\par \par No fevers chills night sweats\par No fatigue, tiredness, apathy\par No appetite loss or weight loss/weight gain\par No chest pain, shortness of breath, palpitation, dizziness\par No abdominal pain, nausea, vomiting, diarrhea, constipation\par No bright red blood per rectum, hematemesis or melena\par No hematuria, dysuria, frequency, urgency\par No headaches, visual changes, focal weakness, tingling, numbness\par  \par s/p IV injectafer on 5/21/19; next dose today\par \par She feels dramatic improvement in her symptoms\par She has more energy, is more awake\par \par She saw Dr Austen Haley (GI) -scheduled for CT, \par Had EGD (5/19/19)- FINAL ENDOSCOPIC DIAGNOSES:\par 1.  Erosive esophagitis with grade A erosions x2 in a patient with a previous fundoplication surgery but intractable symptoms of reflux and regurgitation and occasional dysphagia.\par 2.  Patchy granularity in the descending duodenum, of uncertain significance.\par \par Repeat EGD with Dr Haley (2/5/20)\par 1.  Esophageal ulcer, moderately large hiatal hernia recurrence, and a slight narrowing at the gastroesophageal junction, which was dilated.\par 2.  Multifocal mucosal abnormality with 3 plaques minimum noted in the descending duodenum, and APC treatment was not felt to be an appropriate intervention on the basis of this exam.\par \par Pathology\par A. Lower descending duodenum, biopsy:\par      B-cell lymphoma, follicular type.  (See comment)\par B. Mid descending duodenum, biopsy:\par      B-cell lymphoma, follicular type, low-grade.\par C. Duodenum jejunal junction, biopsy:\par      B-cell lymphoma, follicular type, low-grade.\par D. Stomach, biopsy:\par      Mild chronic gastritis without activity.\par      A Giemsa stain is negative for H. pylori.\par \par CT neck, chest. abdomen and pelvis\par Large bilateral submandibular lymph nodes have mildly increased in size.\par No evidence of acute process.\par Splenomegaly likely unchanged as compared to prior exam\par No evidence of mass within the chest abdomen or pelvis.\par Stable 1 cm left axillary lymph node as compared to prior exam.\par No new adenopathy within the chest abdomen or pelvis\par Stable right upper lobe air cyst\par \par s/p Modern vacinne in March 2021. \par

## 2021-08-05 NOTE — ASSESSMENT
[FreeTextEntry1] : CLL \par Diagnosed in \par Peripheral blood FISH showed 13q del (favorable prognosis)\par IgHV mutated (favorable prognosis)\par Previously cared for by Dr Selina Carver ([facility])\par CT 2017 without any lymphadenopathy\par Mcintyre stage 0, low risk\par \par Seen today for follow up\par Starting Gazvya (21)\par Calquence obtained with assistance which was short term. She opted against it as she was unsure what to do after assistance \par At this point, she prefers to only continue gazvya, She will explore more options in future\par Vasovagal reaction after starting gazvya- likely from her urge to defecate. No interventions necessary\par Plan:\par Gazvya x 6 cycles\par Allopurinol BID x 15 days - start along with calquence\par \par #Duodenal type follicular lymphoma\par Incidental finding on EGD\par PET/CT (19) - Left retropharyngeal Lymph node (SUV 6.7)\par Unlikely causing any symptoms\par Repeat EGD (2020) showed 3 plaques (max 2.5 cm) with biopsy cw duodenal type follicular lymphoma\par Explained that calquence/gazvya may benefit \par \par #urinary incontinence\par Saw urology and scheduled bladder sling surgery. \par \par #Neuropathy\par FOllows  with neurology\par \par s/p Moderna Vaccine in 2021. \par \par Follow up in 4 weeks for gazvya C2\par CBC, CMP, LDH, iron studies, ferritin

## 2021-08-27 ENCOUNTER — NON-APPOINTMENT (OUTPATIENT)
Age: 76
End: 2021-08-27

## 2021-09-01 ENCOUNTER — RX RENEWAL (OUTPATIENT)
Age: 76
End: 2021-09-01

## 2021-09-02 ENCOUNTER — APPOINTMENT (OUTPATIENT)
Dept: HEMATOLOGY ONCOLOGY | Facility: CLINIC | Age: 76
End: 2021-09-02
Payer: MEDICARE

## 2021-09-02 ENCOUNTER — RESULT REVIEW (OUTPATIENT)
Age: 76
End: 2021-09-02

## 2021-09-02 VITALS
HEART RATE: 105 BPM | DIASTOLIC BLOOD PRESSURE: 73 MMHG | HEIGHT: 60 IN | BODY MASS INDEX: 27.92 KG/M2 | RESPIRATION RATE: 18 BRPM | WEIGHT: 142.19 LBS | OXYGEN SATURATION: 97 % | SYSTOLIC BLOOD PRESSURE: 142 MMHG | TEMPERATURE: 98.3 F

## 2021-09-02 PROCEDURE — 99215 OFFICE O/P EST HI 40 MIN: CPT

## 2021-09-02 NOTE — PHYSICAL EXAM
[Ambulatory and capable of all self care but unable to carry out any work activities] : Status 2- Ambulatory and capable of all self care but unable to carry out any work activities. Up and about more than 50% of waking hours [Normal] : affect appropriate [de-identified] : palpable cervical lymphadenopathy

## 2021-09-02 NOTE — ASSESSMENT
[FreeTextEntry1] : CLL \par Diagnosed in \par Peripheral blood FISH showed 13q del (favorable prognosis)\par IgHV mutated (favorable prognosis)\par Previously cared for by Dr Selina Carver ([facility])\par CT 2017 without any lymphadenopathy\par Mcintyre stage 0, low risk\par On Gazvya (21 - present)\par Calquence obtained with assistance which was short term. She opted against it as she was unsure what to do after assistance \par \par She is seen today for follow up and C2\par Overall feels the same since starting gazvya\par Labs reviewed, analyzed and discussed\par Excellent response in terms of WBC and lymphocyte counts\par Hgb/ platelets stable to better\par Explained that if her symptoms do not improve-> they are likely related to alternate causes and not CLL given excellent response in WBC\par Advised to monitor for fevers \par Plan:\par Gazvya x 6 cycles\par \par Duodenal type follicular lymphoma\par Incidental finding on EGD\par PET/CT (19) - Left retropharyngeal Lymph node (SUV 6.7)\par Unlikely causing any symptoms\par Repeat EGD (2020) showed 3 plaques (max 2.5 cm) with biopsy cw duodenal type follicular lymphoma\par Explained that gazvya may benefit \par \par #urinary incontinence\par Saw urology and scheduled bladder sling surgery. \par \par #Neuropathy\par FOllows  with neurology\par \par s/p Moderna Vaccine in 2021. \par \par Follow up in 4 weeks for gazvya C3\par CBC, CMP, LDH, iron studies, ferritin\par \par

## 2021-09-02 NOTE — CONSULT LETTER
[Dear  ___] : Dear  [unfilled], [Courtesy Letter:] : I had the pleasure of seeing your patient, [unfilled], in my office today. [Please see my note below.] : Please see my note below. [Consult Closing:] : Thank you very much for allowing me to participate in the care of this patient.  If you have any questions, please do not hesitate to contact me. [Sincerely,] : Sincerely, [DrCamilo  ___] : Dr. KIMBROUGH [DrCamilo ___] : Dr. KIMBROUGH [FreeTextEntry3] : Ron Bennett MD, MPH\par Attending Physician\par Hematology Oncology\par [facility] [place]\par [facility]\par

## 2021-09-02 NOTE — HISTORY OF PRESENT ILLNESS
[de-identified] : Patient is seen today for follow up and C2 gazvya (8/5/21 - present)\par \par She continues to feel tired and fatigued\par Problems swallowing continue\par She co pain from neuropathy which is unchanged\par \par overall she feels that her symptoms have not changed since starting gazvya\par \par  [de-identified] : 73 year old female presents today for CLL, referred by Dr. iJm.  \par \par Around 2012- She was not feeling well, weakness, fatigue, frequent infections- saw her PCP- who did blood work which showed elevated WBC- was referred to hematologist (Dr Selina Carver) - who diagnosed with CLL- she has been following with her since. Has intermittently had issues, which were not satisfactorily answered. \par She was last seen 4-5 years back. SHe saw Dr Monet another oncologist in the same practice in 2016 who did the FISH panel which showed del 13 q\par SHe decided to switch care and is here to establish care\par \par She continues to be tired. Has to sit after minimal activity\par She does not sleep very well - due to neuropathy - has had tingling numbness, burning bl feet x 3 years\par \par No fevers chills night sweats\par No fatigue, tiredness, apathy\par No appetite loss or weight loss/weight gain\par No chest pain, shortness of breath, palpitation, dizziness\par No abdominal pain, nausea, vomiting, diarrhea, constipation\par No bright red blood per rectum, hematemesis or melena\par No hematuria, dysuria, frequency, urgency\par No headaches, visual changes, focal weakness, tingling, numbness\par  \par s/p IV injectafer on 5/21/19; next dose today\par \par She feels dramatic improvement in her symptoms\par She has more energy, is more awake\par \par She saw Dr Austen Haley (GI) -scheduled for CT, \par Had EGD (5/19/19)- FINAL ENDOSCOPIC DIAGNOSES:\par 1.  Erosive esophagitis with grade A erosions x2 in a patient with a previous fundoplication surgery but intractable symptoms of reflux and regurgitation and occasional dysphagia.\par 2.  Patchy granularity in the descending duodenum, of uncertain significance.\par \par Repeat EGD with Dr Haley (2/5/20)\par 1.  Esophageal ulcer, moderately large hiatal hernia recurrence, and a slight narrowing at the gastroesophageal junction, which was dilated.\par 2.  Multifocal mucosal abnormality with 3 plaques minimum noted in the descending duodenum, and APC treatment was not felt to be an appropriate intervention on the basis of this exam.\par \par Pathology\par A. Lower descending duodenum, biopsy:\par      B-cell lymphoma, follicular type.  (See comment)\par B. Mid descending duodenum, biopsy:\par      B-cell lymphoma, follicular type, low-grade.\par C. Duodenum jejunal junction, biopsy:\par      B-cell lymphoma, follicular type, low-grade.\par D. Stomach, biopsy:\par      Mild chronic gastritis without activity.\par      A Giemsa stain is negative for H. pylori.\par \par CT neck, chest. abdomen and pelvis\par Large bilateral submandibular lymph nodes have mildly increased in size.\par No evidence of acute process.\par Splenomegaly likely unchanged as compared to prior exam\par No evidence of mass within the chest abdomen or pelvis.\par Stable 1 cm left axillary lymph node as compared to prior exam.\par No new adenopathy within the chest abdomen or pelvis\par Stable right upper lobe air cyst\par \par s/p Moderna vacinne in March 2021. \par \par Calquence was cost prohibitive nad patient declined \par

## 2021-09-13 ENCOUNTER — NON-APPOINTMENT (OUTPATIENT)
Age: 76
End: 2021-09-13

## 2021-09-13 ENCOUNTER — RX RENEWAL (OUTPATIENT)
Age: 76
End: 2021-09-13

## 2021-09-20 ENCOUNTER — NON-APPOINTMENT (OUTPATIENT)
Age: 76
End: 2021-09-20

## 2021-09-22 ENCOUNTER — NON-APPOINTMENT (OUTPATIENT)
Age: 76
End: 2021-09-22

## 2021-09-30 ENCOUNTER — RESULT REVIEW (OUTPATIENT)
Age: 76
End: 2021-09-30

## 2021-09-30 ENCOUNTER — APPOINTMENT (OUTPATIENT)
Dept: HEMATOLOGY ONCOLOGY | Facility: CLINIC | Age: 76
End: 2021-09-30
Payer: MEDICARE

## 2021-09-30 VITALS
HEIGHT: 60 IN | RESPIRATION RATE: 16 BRPM | HEART RATE: 75 BPM | BODY MASS INDEX: 28.72 KG/M2 | WEIGHT: 146.3 LBS | OXYGEN SATURATION: 96 % | DIASTOLIC BLOOD PRESSURE: 75 MMHG | TEMPERATURE: 98 F | SYSTOLIC BLOOD PRESSURE: 127 MMHG

## 2021-09-30 DIAGNOSIS — Z79.899 OTHER LONG TERM (CURRENT) DRUG THERAPY: ICD-10-CM

## 2021-09-30 PROCEDURE — 99214 OFFICE O/P EST MOD 30 MIN: CPT

## 2021-09-30 NOTE — HISTORY OF PRESENT ILLNESS
[de-identified] : Patient is seen today for follow up and C3 molina (8/5/21 - present)\par \par She continues to feel tired and fatigued\par Problems swallowing continue\par Also has night sweats\par She co pain from neuropathy which is unchanged\par \par She does reports significant improvement in the cervical lymphadenopathy\par  [de-identified] : 73 year old female presents today for CLL, referred by Dr. Jim.  \par \par Around 2012- She was not feeling well, weakness, fatigue, frequent infections- saw her PCP- who did blood work which showed elevated WBC- was referred to hematologist (Dr Selina Carver) - who diagnosed with CLL- she has been following with her since. Has intermittently had issues, which were not satisfactorily answered. \par She was last seen 4-5 years back. SHe saw Dr Monet another oncologist in the same practice in 2016 who did the FISH panel which showed del 13 q\par SHe decided to switch care and is here to establish care\par \par She continues to be tired. Has to sit after minimal activity\par She does not sleep very well - due to neuropathy - has had tingling numbness, burning bl feet x 3 years\par \par No fevers chills night sweats\par No fatigue, tiredness, apathy\par No appetite loss or weight loss/weight gain\par No chest pain, shortness of breath, palpitation, dizziness\par No abdominal pain, nausea, vomiting, diarrhea, constipation\par No bright red blood per rectum, hematemesis or melena\par No hematuria, dysuria, frequency, urgency\par No headaches, visual changes, focal weakness, tingling, numbness\par  \par s/p IV injectafer on 5/21/19; next dose today\par \par She feels dramatic improvement in her symptoms\par She has more energy, is more awake\par \par She saw Dr Austen Haley (GI) -scheduled for CT, \par Had EGD (5/19/19)- FINAL ENDOSCOPIC DIAGNOSES:\par 1.  Erosive esophagitis with grade A erosions x2 in a patient with a previous fundoplication surgery but intractable symptoms of reflux and regurgitation and occasional dysphagia.\par 2.  Patchy granularity in the descending duodenum, of uncertain significance.\par \par Repeat EGD with Dr Haley (2/5/20)\par 1.  Esophageal ulcer, moderately large hiatal hernia recurrence, and a slight narrowing at the gastroesophageal junction, which was dilated.\par 2.  Multifocal mucosal abnormality with 3 plaques minimum noted in the descending duodenum, and APC treatment was not felt to be an appropriate intervention on the basis of this exam.\par \par Pathology\par A. Lower descending duodenum, biopsy:\par      B-cell lymphoma, follicular type.  (See comment)\par B. Mid descending duodenum, biopsy:\par      B-cell lymphoma, follicular type, low-grade.\par C. Duodenum jejunal junction, biopsy:\par      B-cell lymphoma, follicular type, low-grade.\par D. Stomach, biopsy:\par      Mild chronic gastritis without activity.\par      A Giemsa stain is negative for H. pylori.\par \par CT neck, chest. abdomen and pelvis\par Large bilateral submandibular lymph nodes have mildly increased in size.\par No evidence of acute process.\par Splenomegaly likely unchanged as compared to prior exam\par No evidence of mass within the chest abdomen or pelvis.\par Stable 1 cm left axillary lymph node as compared to prior exam.\par No new adenopathy within the chest abdomen or pelvis\par Stable right upper lobe air cyst\par \par s/p Moderna vacinne in March 2021. \par \par Calquence was cost prohibitive nad patient declined \par

## 2021-09-30 NOTE — ASSESSMENT
[FreeTextEntry1] : CLL \par Diagnosed in \par Peripheral blood FISH showed 13q del (favorable prognosis)\par IgHV mutated (favorable prognosis)\par Previously cared for by Dr Selina Carver ([facility])\par CT 2017 without any lymphadenopathy\par Mcintyre stage 0, low risk\par On Gazvya (21 - present)\par Calquence obtained with assistance which was short term. She opted against it as she was unsure what to do after assistance \par \par She is seen today for follow up and C3\par Overall feels the same since starting gazvya\par Labs reviewed, analyzed and discussed\par Excellent response in terms of WBC and lymphocyte counts\par Hgb/ platelets stable to better\par Explained that if her symptoms do not improve-> they are likely related to alternate causes and not CLL given excellent response in WBC\par Plan:\par Gazvya x 6 cycles\par \par Duodenal type follicular lymphoma\par Incidental finding on EGD\par PET/CT (19) - Left retropharyngeal Lymph node (SUV 6.7)\par Unlikely causing any symptoms\par Repeat EGD (2020) showed 3 plaques (max 2.5 cm) with biopsy cw duodenal type follicular lymphoma\par Explained that gazvya may benefit \par \par #urinary incontinence\par Saw urology and scheduled bladder sling surgery. \par \par #Neuropathy\par FOllows  with neurology\par \par s/p Moderna Vaccine in 2021. \par \par Follow up in 4 weeks for gazvya C4\par CBC, CMP, LDH, iron studies, ferritin\par \par

## 2021-09-30 NOTE — PHYSICAL EXAM
[Ambulatory and capable of all self care but unable to carry out any work activities] : Status 2- Ambulatory and capable of all self care but unable to carry out any work activities. Up and about more than 50% of waking hours [Normal] : affect appropriate [de-identified] : palpable cervical lymphadenopathy

## 2021-09-30 NOTE — CONSULT LETTER
[Dear  ___] : Dear  [unfilled], [Courtesy Letter:] : I had the pleasure of seeing your patient, [unfilled], in my office today. [Please see my note below.] : Please see my note below. [Consult Closing:] : Thank you very much for allowing me to participate in the care of this patient.  If you have any questions, please do not hesitate to contact me. [Sincerely,] : Sincerely, [DrCamilo  ___] : Dr. KIMBROUGH [DrCamilo ___] : Dr. KIMBROUGH [FreeTextEntry3] : Ron Bennett MD, MPH\par Attending Physician\par Hematology Oncology\par [facility] [place]\par [facility]\par

## 2021-10-05 NOTE — HISTORY OF PRESENT ILLNESS
Chief Complaint   Patient presents with     Botox      Stephen Edmondson     [de-identified] : Patient is seen today for follow up\par \par She co pain from neuropathy\par Has problems swallowing\par Had EGD with Dr Austen Haley\par Per GI- need to start dexilant but has high copay\par s/p Modern vacinne in March 2021. \par \par CT neck, chest. abdomen and pelvis\par Large bilateral submandibular lymph nodes have mildly increased in size.\par No evidence of acute process.\par Splenomegaly likely unchanged as compared to prior exam\par No evidence of mass within the chest abdomen or pelvis.\par Stable 1 cm left axillary lymph node as compared to prior exam.\par No new adenopathy within the chest abdomen or pelvis\par Stable right upper lobe air cyst\par \par  [de-identified] : 73 year old female presents today for CLL, referred by Dr. Jim.  \par \par Around 2012- She was not feeling well, weakness, fatigue, frequent infections- saw her PCP- who did blood work which showed elevated WBC- was referred to hematologist (Dr Selina Carver) - who diagnosed with CLL- she has been following with her since. Has intermittently had issues, which were not satisfactorily answered. \par She was last seen 4-5 years back. SHe saw Dr Monet another oncologist in the same practice in 2016 who did the FISH panel which showed del 13 q\par SHe decided to switch care and is here to establish care\par \par She continues to be tired. Has to sit after minimal activity\par She does not sleep very well - due to neuropathy - has had tingling numbness, burning bl feet x 3 years\par \par No fevers chills night sweats\par No fatigue, tiredness, apathy\par No appetite loss or weight loss/weight gain\par No chest pain, shortness of breath, palpitation, dizziness\par No abdominal pain, nausea, vomiting, diarrhea, constipation\par No bright red blood per rectum, hematemesis or melena\par No hematuria, dysuria, frequency, urgency\par No headaches, visual changes, focal weakness, tingling, numbness\par  \par s/p IV injectafer on 5/21/19; next dose today\par \par She feels dramatic improvement in her symptoms\par She has more energy, is more awake\par \par She saw Dr Austen Haley (GI) -scheduled for CT, \par Had EGD (5/19/19)- FINAL ENDOSCOPIC DIAGNOSES:\par 1.  Erosive esophagitis with grade A erosions x2 in a patient with a previous fundoplication surgery but intractable symptoms of reflux and regurgitation and occasional dysphagia.\par 2.  Patchy granularity in the descending duodenum, of uncertain significance.\par \par Repeat EGD with Dr Haley (2/5/20)\par 1.  Esophageal ulcer, moderately large hiatal hernia recurrence, and a slight narrowing at the gastroesophageal junction, which was dilated.\par 2.  Multifocal mucosal abnormality with 3 plaques minimum noted in the descending duodenum, and APC treatment was not felt to be an appropriate intervention on the basis of this exam.\par \par Pathology\par A. Lower descending duodenum, biopsy:\par      B-cell lymphoma, follicular type.  (See comment)\par B. Mid descending duodenum, biopsy:\par      B-cell lymphoma, follicular type, low-grade.\par C. Duodenum jejunal junction, biopsy:\par      B-cell lymphoma, follicular type, low-grade.\par D. Stomach, biopsy:\par      Mild chronic gastritis without activity.\par      A Giemsa stain is negative for H. pylori.\par

## 2021-10-14 ENCOUNTER — APPOINTMENT (OUTPATIENT)
Dept: NEUROLOGY | Facility: CLINIC | Age: 76
End: 2021-10-14

## 2021-10-29 ENCOUNTER — RESULT REVIEW (OUTPATIENT)
Age: 76
End: 2021-10-29

## 2021-10-29 ENCOUNTER — APPOINTMENT (OUTPATIENT)
Dept: HEMATOLOGY ONCOLOGY | Facility: CLINIC | Age: 76
End: 2021-10-29
Payer: MEDICARE

## 2021-10-29 VITALS
WEIGHT: 147.7 LBS | SYSTOLIC BLOOD PRESSURE: 145 MMHG | HEIGHT: 60 IN | OXYGEN SATURATION: 98 % | RESPIRATION RATE: 16 BRPM | BODY MASS INDEX: 29 KG/M2 | TEMPERATURE: 97.7 F | HEART RATE: 78 BPM | DIASTOLIC BLOOD PRESSURE: 75 MMHG

## 2021-10-29 DIAGNOSIS — Z00.00 ENCOUNTER FOR GENERAL ADULT MEDICAL EXAMINATION W/OUT ABNORMAL FINDINGS: ICD-10-CM

## 2021-10-29 DIAGNOSIS — D69.6 THROMBOCYTOPENIA, UNSPECIFIED: ICD-10-CM

## 2021-10-29 PROCEDURE — 36415 COLL VENOUS BLD VENIPUNCTURE: CPT

## 2021-10-29 PROCEDURE — 99215 OFFICE O/P EST HI 40 MIN: CPT | Mod: 25

## 2021-10-29 RX ORDER — NAPROXEN 500 MG/1
TABLET ORAL
Refills: 0 | Status: DISCONTINUED | COMMUNITY
End: 2021-10-29

## 2021-10-29 RX ORDER — ALLOPURINOL 300 MG/1
300 TABLET ORAL TWICE DAILY
Qty: 30 | Refills: 0 | Status: DISCONTINUED | COMMUNITY
Start: 2021-07-23 | End: 2021-10-29

## 2021-10-29 RX ORDER — ACALABRUTINIB 100 MG/1
100 CAPSULE, GELATIN COATED ORAL
Qty: 60 | Refills: 11 | Status: DISCONTINUED | COMMUNITY
Start: 2021-07-23 | End: 2021-10-29

## 2021-10-29 NOTE — RESULTS/DATA
[FreeTextEntry1] : Labs reviewed, analyzed and discussed\par 10/26/21 wbc 3.4 Hgb 12.5 hct 36.8 plts 126

## 2021-10-29 NOTE — CONSULT LETTER
[Dear  ___] : Dear  [unfilled], [Courtesy Letter:] : I had the pleasure of seeing your patient, [unfilled], in my office today. [Please see my note below.] : Please see my note below. [Consult Closing:] : Thank you very much for allowing me to participate in the care of this patient.  If you have any questions, please do not hesitate to contact me. [Sincerely,] : Sincerely, [DrCamilo  ___] : Dr. KIMBROUGH [DrCamilo ___] : Dr. KIMBROUGH [FreeTextEntry3] : Ron Bennett MD, MPH\par Attending Physician\par Hematology Oncology\par [facility] [place]\par [facility]\par

## 2021-10-29 NOTE — HISTORY OF PRESENT ILLNESS
[de-identified] : 73 year old female presents today for CLL, referred by Dr. Jim.  \par \par Around 2012- She was not feeling well, weakness, fatigue, frequent infections- saw her PCP- who did blood work which showed elevated WBC- was referred to hematologist (Dr Selina Carver) - who diagnosed with CLL- she has been following with her since. Has intermittently had issues, which were not satisfactorily answered. \par She was last seen 4-5 years back. SHe saw Dr Monet another oncologist in the same practice in 2016 who did the FISH panel which showed del 13 q\par SHe decided to switch care and is here to establish care\par \par She continues to be tired. Has to sit after minimal activity\par She does not sleep very well - due to neuropathy - has had tingling numbness, burning bl feet x 3 years\par \par No fevers chills night sweats\par No fatigue, tiredness, apathy\par No appetite loss or weight loss/weight gain\par No chest pain, shortness of breath, palpitation, dizziness\par No abdominal pain, nausea, vomiting, diarrhea, constipation\par No bright red blood per rectum, hematemesis or melena\par No hematuria, dysuria, frequency, urgency\par No headaches, visual changes, focal weakness, tingling, numbness\par  \par s/p IV injectafer on 5/21/19; next dose today\par \par She feels dramatic improvement in her symptoms\par She has more energy, is more awake\par \par She saw Dr Austen Haley (GI) -scheduled for CT, \par Had EGD (5/19/19)- FINAL ENDOSCOPIC DIAGNOSES:\par 1.  Erosive esophagitis with grade A erosions x2 in a patient with a previous fundoplication surgery but intractable symptoms of reflux and regurgitation and occasional dysphagia.\par 2.  Patchy granularity in the descending duodenum, of uncertain significance.\par \par Repeat EGD with Dr Haley (2/5/20)\par 1.  Esophageal ulcer, moderately large hiatal hernia recurrence, and a slight narrowing at the gastroesophageal junction, which was dilated.\par 2.  Multifocal mucosal abnormality with 3 plaques minimum noted in the descending duodenum, and APC treatment was not felt to be an appropriate intervention on the basis of this exam.\par \par Pathology\par A. Lower descending duodenum, biopsy:\par      B-cell lymphoma, follicular type.  (See comment)\par B. Mid descending duodenum, biopsy:\par      B-cell lymphoma, follicular type, low-grade.\par C. Duodenum jejunal junction, biopsy:\par      B-cell lymphoma, follicular type, low-grade.\par D. Stomach, biopsy:\par      Mild chronic gastritis without activity.\par      A Giemsa stain is negative for H. pylori.\par \par CT neck, chest. abdomen and pelvis\par Large bilateral submandibular lymph nodes have mildly increased in size.\par No evidence of acute process.\par Splenomegaly likely unchanged as compared to prior exam\par No evidence of mass within the chest abdomen or pelvis.\par Stable 1 cm left axillary lymph node as compared to prior exam.\par No new adenopathy within the chest abdomen or pelvis\par Stable right upper lobe air cyst\par \par s/p Moderna vacinne in March 2021. \par \par Calquence was cost prohibitive nad patient declined \par  [de-identified] : Patient is seen today for follow up and C4 molina (8/5/21 - present)\par Accompanied by her daughter \par \par Patient with unchanged chronic fatigue, needing nap most of every day.  Her night sweats also stay the same. Her generalized body ache is better.\par She's been having abdominal discomfort and swallowing issue - unchanged from prior. She's taking Pantoprazole am and Sulcrafate pm. \par cervical lymphadenopathy have decreased in size since started treatment. \par \par

## 2021-10-29 NOTE — ASSESSMENT
[FreeTextEntry1] : CLL \par Diagnosed in \par Peripheral blood FISH showed 13q del (favorable prognosis)\par IgHV mutated (favorable prognosis)\par Previously cared for by Dr Selina Carver ([facility])\par CT 2017 without any lymphadenopathy\par Mcintyre stage 0, low risk\par On Gazvya (21 - present)\par Calquence obtained with assistance which was short term. She opted against it as she was unsure what to do after assistance \par \par She is seen today for follow up and C4\par Labs reviewed, analyzed and discussed\par Unchanged persistent fatigue and weakness but her WBCs and Lymphocytes have greatly reduced. \par Hgb/ platelets stable to better\par Explained that if her symptoms do not improve-> they are likely related to alternate causes and not CLL given \par proceed with treatment\par \par Plan:\par Gazvya x 6 cycles\par \par Duodenal type follicular lymphoma\par Incidental finding on EGD\par PET/CT (19) - Left retropharyngeal Lymph node (SUV 6.7)\par Unlikely causing any symptoms\par Repeat EGD (2020) showed 3 plaques (max 2.5 cm) with biopsy cw duodenal type follicular lymphoma\par Explained that gazvya may benefit \par \par #urinary incontinence - improved with increased hydration\par \par #Neuropathy\par FOllows  with neurology\par \par s/p Moderna Vaccine in 2021. \par \par d/w Dr. Bennett \par Follow up in 4 weeks for gazvya C5\par CBC, CMP, LDH, iron studies, ferritin\par \par

## 2021-10-29 NOTE — PHYSICAL EXAM
[Ambulatory and capable of all self care but unable to carry out any work activities] : Status 2- Ambulatory and capable of all self care but unable to carry out any work activities. Up and about more than 50% of waking hours [Normal] : affect appropriate [de-identified] : +palpable b/l 'marble' size  submandibular LNs.

## 2021-11-24 ENCOUNTER — RESULT REVIEW (OUTPATIENT)
Age: 76
End: 2021-11-24

## 2021-11-24 ENCOUNTER — APPOINTMENT (OUTPATIENT)
Dept: HEMATOLOGY ONCOLOGY | Facility: CLINIC | Age: 76
End: 2021-11-24
Payer: MEDICARE

## 2021-11-24 VITALS
HEIGHT: 60 IN | OXYGEN SATURATION: 98 % | DIASTOLIC BLOOD PRESSURE: 64 MMHG | TEMPERATURE: 96.9 F | RESPIRATION RATE: 16 BRPM | WEIGHT: 146 LBS | SYSTOLIC BLOOD PRESSURE: 129 MMHG | HEART RATE: 81 BPM | BODY MASS INDEX: 28.66 KG/M2

## 2021-11-24 PROCEDURE — 99214 OFFICE O/P EST MOD 30 MIN: CPT | Mod: 25

## 2021-11-24 PROCEDURE — 36415 COLL VENOUS BLD VENIPUNCTURE: CPT

## 2021-11-24 NOTE — HISTORY OF PRESENT ILLNESS
[de-identified] : 73 year old female presents today for CLL, referred by Dr. Jim.  \par \par Around 2012- She was not feeling well, weakness, fatigue, frequent infections- saw her PCP- who did blood work which showed elevated WBC- was referred to hematologist (Dr Selina Carver) - who diagnosed with CLL- she has been following with her since. Has intermittently had issues, which were not satisfactorily answered. \par She was last seen 4-5 years back. SHe saw Dr Monet another oncologist in the same practice in 2016 who did the FISH panel which showed del 13 q\par SHe decided to switch care and is here to establish care\par \par She continues to be tired. Has to sit after minimal activity\par She does not sleep very well - due to neuropathy - has had tingling numbness, burning bl feet x 3 years\par \par No fevers chills night sweats\par No fatigue, tiredness, apathy\par No appetite loss or weight loss/weight gain\par No chest pain, shortness of breath, palpitation, dizziness\par No abdominal pain, nausea, vomiting, diarrhea, constipation\par No bright red blood per rectum, hematemesis or melena\par No hematuria, dysuria, frequency, urgency\par No headaches, visual changes, focal weakness, tingling, numbness\par  \par s/p IV injectafer on 5/21/19; next dose today\par \par She feels dramatic improvement in her symptoms\par She has more energy, is more awake\par \par She saw Dr Austen Haley (GI) -scheduled for CT, \par Had EGD (5/19/19)- FINAL ENDOSCOPIC DIAGNOSES:\par 1.  Erosive esophagitis with grade A erosions x2 in a patient with a previous fundoplication surgery but intractable symptoms of reflux and regurgitation and occasional dysphagia.\par 2.  Patchy granularity in the descending duodenum, of uncertain significance.\par \par Repeat EGD with Dr Haley (2/5/20)\par 1.  Esophageal ulcer, moderately large hiatal hernia recurrence, and a slight narrowing at the gastroesophageal junction, which was dilated.\par 2.  Multifocal mucosal abnormality with 3 plaques minimum noted in the descending duodenum, and APC treatment was not felt to be an appropriate intervention on the basis of this exam.\par \par Pathology\par A. Lower descending duodenum, biopsy:\par      B-cell lymphoma, follicular type.  (See comment)\par B. Mid descending duodenum, biopsy:\par      B-cell lymphoma, follicular type, low-grade.\par C. Duodenum jejunal junction, biopsy:\par      B-cell lymphoma, follicular type, low-grade.\par D. Stomach, biopsy:\par      Mild chronic gastritis without activity.\par      A Giemsa stain is negative for H. pylori.\par \par CT neck, chest. abdomen and pelvis\par Large bilateral submandibular lymph nodes have mildly increased in size.\par No evidence of acute process.\par Splenomegaly likely unchanged as compared to prior exam\par No evidence of mass within the chest abdomen or pelvis.\par Stable 1 cm left axillary lymph node as compared to prior exam.\par No new adenopathy within the chest abdomen or pelvis\par Stable right upper lobe air cyst\par \par s/p Moderna vacinne in March 2021. \par \par Calquence was cost prohibitive nad patient declined \par  [de-identified] : Patient is seen today for follow up and C5 molina (8/5/21 - present)\par Accompanied by her daughter \par \par Patient is tolerating treatment well but has persistent fatigue and neuropathy on her b/l lower ext - she has tried B6+alpha lipoic acid in the last week. \par submandibular lymphadenopathy is smaller but still palpable - scheduled to see Dr. Haley on 1/25/21\par She would consider PT for neuropathy in Jan 2021. \par \par

## 2021-11-24 NOTE — PHYSICAL EXAM
[Ambulatory and capable of all self care but unable to carry out any work activities] : Status 2- Ambulatory and capable of all self care but unable to carry out any work activities. Up and about more than 50% of waking hours [Normal] : affect appropriate [de-identified] : +submandilar lymphadenopathy - smaller [de-identified] : +palpable b/l 'marble' size  submandibular LNs.

## 2021-11-24 NOTE — ASSESSMENT
[FreeTextEntry1] : CLL \par Diagnosed in \par Peripheral blood FISH showed 13q del (favorable prognosis)\par IgHV mutated (favorable prognosis)\par Previously cared for by Dr Selina Carver ([facility])\par CT 2017 without any lymphadenopathy\par Mcintyre stage 0, low risk\par On Gazvya (21 - present)\par Calquence obtained with assistance which was short term. She opted against it as she was unsure what to do after assistance \par \par She is seen today for follow up and C5\par Labs reviewed, analyzed and discussed\par Patient with persistent fatigue but not sleeping well at night due to her neuropathy. \par Tolerates treatment well.\par \par Plan:\par Gazvya x 6 cycles\par \par Duodenal type follicular lymphoma\par Incidental finding on EGD\par PET/CT (19) - Left retropharyngeal Lymph node (SUV 6.7)\par Unlikely causing any symptoms\par Repeat EGD (2020) showed 3 plaques (max 2.5 cm) with biopsy cw duodenal type follicular lymphoma\par Explained that gazvya may benefit \par \par #urinary incontinence - improved with increased hydration\par \par #Neuropathy\par FOllows  with neurology\par PT recommended, which she'll consider after the holidays. \par \par s/p Moderna Vaccine in 2021. \par \par d/w Dr. Bennett \par Follow up in 4 weeks for gazvya C6\par CBC, CMP, LDH, iron studies, ferritin\par \par

## 2021-11-24 NOTE — CONSULT LETTER
[Dear  ___] : Dear  [unfilled], [Courtesy Letter:] : I had the pleasure of seeing your patient, [unfilled], in my office today. [Please see my note below.] : Please see my note below. [Consult Closing:] : Thank you very much for allowing me to participate in the care of this patient.  If you have any questions, please do not hesitate to contact me. [Sincerely,] : Sincerely, [DrCamilo  ___] : Dr. KIMBROUGH [DrCamilo ___] : Dr. KIMBROUGH [FreeTextEntry3] : Ron Bennett MD, MPH\par Attending Physician\par Hematology Oncology\par [facility] [place]\par [facility]\par

## 2021-11-25 ENCOUNTER — RX RENEWAL (OUTPATIENT)
Age: 76
End: 2021-11-25

## 2021-12-02 ENCOUNTER — NON-APPOINTMENT (OUTPATIENT)
Age: 76
End: 2021-12-02

## 2021-12-21 ENCOUNTER — RX RENEWAL (OUTPATIENT)
Age: 76
End: 2021-12-21

## 2021-12-23 ENCOUNTER — APPOINTMENT (OUTPATIENT)
Dept: HEMATOLOGY ONCOLOGY | Facility: CLINIC | Age: 76
End: 2021-12-23

## 2022-01-16 ENCOUNTER — RX RENEWAL (OUTPATIENT)
Age: 77
End: 2022-01-16

## 2022-01-19 ENCOUNTER — RESULT REVIEW (OUTPATIENT)
Age: 77
End: 2022-01-19

## 2022-01-19 ENCOUNTER — APPOINTMENT (OUTPATIENT)
Dept: HEMATOLOGY ONCOLOGY | Facility: CLINIC | Age: 77
End: 2022-01-19
Payer: MEDICARE

## 2022-01-19 VITALS
WEIGHT: 147.13 LBS | HEIGHT: 60 IN | HEART RATE: 103 BPM | TEMPERATURE: 99.6 F | DIASTOLIC BLOOD PRESSURE: 83 MMHG | BODY MASS INDEX: 28.89 KG/M2 | SYSTOLIC BLOOD PRESSURE: 130 MMHG | OXYGEN SATURATION: 96 % | RESPIRATION RATE: 16 BRPM

## 2022-01-19 DIAGNOSIS — Z51.11 ENCOUNTER FOR ANTINEOPLASTIC CHEMOTHERAPY: ICD-10-CM

## 2022-01-19 PROCEDURE — 99214 OFFICE O/P EST MOD 30 MIN: CPT | Mod: 25

## 2022-01-19 PROCEDURE — 36415 COLL VENOUS BLD VENIPUNCTURE: CPT

## 2022-01-19 NOTE — ASSESSMENT
[FreeTextEntry1] : CLL \par Diagnosed in \par Peripheral blood FISH showed 13q del (favorable prognosis)\par IgHV mutated (favorable prognosis)\par Previously cared for by Dr Selina Carver ([facility])\par CT 2017 without any lymphadenopathy\par Mcintyre stage 0, low risk\par On Gazvya (21 - present)\par Calquence obtained with assistance which was short term. She opted against it as she was unsure what to do after assistance \par \par She is seen today for follow up and C6\par Labs reviewed, analyzed and discussed\par Clinically doing well and tolerating treatment\par persistent abdominal discomfort and swallowing difficulty - to follow up with Dr. Haley , perhaps another EGD for follow up. \par to have CT neck, chest, abd/pelvis prior to next fofice visit \par proceed with treatment\par \par Plan:\par Gazvya x 6 cycles\par \par Duodenal type follicular lymphoma\par Incidental finding on EGD\par PET/CT (19) - Left retropharyngeal Lymph node (SUV 6.7)\par Unlikely causing any symptoms\par Repeat EGD (2020) showed 3 plaques (max 2.5 cm) with biopsy cw duodenal type follicular lymphoma\par Explained that gazvya may benefit \par \par #urinary incontinence - improved with increased hydration\par \par #Neuropathy\par FOllows  with neurology\par \par s/p Moderna Vaccine in 2021. \par \par d/w Dr. Bennett \par Follow up in 4 weeks to go over restaging scan \par CBC, CMP, LDH, iron studies, ferritin\par \par

## 2022-01-19 NOTE — CONSULT LETTER
[FreeTextEntry3] : Ron Bennett MD, MPH\par Attending Physician\par Hematology Oncology\par [facility] [place]\par [facility]\par

## 2022-01-19 NOTE — HISTORY OF PRESENT ILLNESS
[de-identified] : 73 year old female presents today for CLL, referred by Dr. Jim.  \par \par Around 2012- She was not feeling well, weakness, fatigue, frequent infections- saw her PCP- who did blood work which showed elevated WBC- was referred to hematologist (Dr Selina Carver) - who diagnosed with CLL- she has been following with her since. Has intermittently had issues, which were not satisfactorily answered. \par She was last seen 4-5 years back. SHe saw Dr Monet another oncologist in the same practice in 2016 who did the FISH panel which showed del 13 q\par SHe decided to switch care and is here to establish care\par \par She continues to be tired. Has to sit after minimal activity\par She does not sleep very well - due to neuropathy - has had tingling numbness, burning bl feet x 3 years\par \par No fevers chills night sweats\par No fatigue, tiredness, apathy\par No appetite loss or weight loss/weight gain\par No chest pain, shortness of breath, palpitation, dizziness\par No abdominal pain, nausea, vomiting, diarrhea, constipation\par No bright red blood per rectum, hematemesis or melena\par No hematuria, dysuria, frequency, urgency\par No headaches, visual changes, focal weakness, tingling, numbness\par  \par s/p IV injectafer on 5/21/19; next dose today\par \par She feels dramatic improvement in her symptoms\par She has more energy, is more awake\par \par She saw Dr Austen Haley (GI) -scheduled for CT, \par Had EGD (5/19/19)- FINAL ENDOSCOPIC DIAGNOSES:\par 1.  Erosive esophagitis with grade A erosions x2 in a patient with a previous fundoplication surgery but intractable symptoms of reflux and regurgitation and occasional dysphagia.\par 2.  Patchy granularity in the descending duodenum, of uncertain significance.\par \par Repeat EGD with Dr Haley (2/5/20)\par 1.  Esophageal ulcer, moderately large hiatal hernia recurrence, and a slight narrowing at the gastroesophageal junction, which was dilated.\par 2.  Multifocal mucosal abnormality with 3 plaques minimum noted in the descending duodenum, and APC treatment was not felt to be an appropriate intervention on the basis of this exam.\par \par Pathology\par A. Lower descending duodenum, biopsy:\par      B-cell lymphoma, follicular type.  (See comment)\par B. Mid descending duodenum, biopsy:\par      B-cell lymphoma, follicular type, low-grade.\par C. Duodenum jejunal junction, biopsy:\par      B-cell lymphoma, follicular type, low-grade.\par D. Stomach, biopsy:\par      Mild chronic gastritis without activity.\par      A Giemsa stain is negative for H. pylori.\par \par CT neck, chest. abdomen and pelvis\par Large bilateral submandibular lymph nodes have mildly increased in size.\par No evidence of acute process.\par Splenomegaly likely unchanged as compared to prior exam\par No evidence of mass within the chest abdomen or pelvis.\par Stable 1 cm left axillary lymph node as compared to prior exam.\par No new adenopathy within the chest abdomen or pelvis\par Stable right upper lobe air cyst\par \par s/p Moderna vacinne in March 2021. \par \par Calquence was cost prohibitive nad patient declined \par  [de-identified] : Patient is seen today for follow up and C6 gazvya (8/5/21 - present)\par \par Patient postponed treatment by two weeks due to not having transportation, her daughter who usually brings her was exposed to Covid at work. \par She continues to do welll with no complaints.\par She's been having abdominal discomfort and swallowing issue - unchanged from prior. She's taking Pantoprazole am and Sulcrafate pm. \par cervical lymphadenopathy have decreased in size since started treatment. \par \par

## 2022-01-21 ENCOUNTER — RESULT REVIEW (OUTPATIENT)
Age: 77
End: 2022-01-21

## 2022-01-25 ENCOUNTER — APPOINTMENT (OUTPATIENT)
Dept: GASTROENTEROLOGY | Facility: CLINIC | Age: 77
End: 2022-01-25

## 2022-02-01 ENCOUNTER — APPOINTMENT (OUTPATIENT)
Dept: NEUROLOGY | Facility: CLINIC | Age: 77
End: 2022-02-01
Payer: MEDICARE

## 2022-02-01 ENCOUNTER — RX RENEWAL (OUTPATIENT)
Age: 77
End: 2022-02-01

## 2022-02-01 VITALS
TEMPERATURE: 98.6 F | HEART RATE: 77 BPM | BODY MASS INDEX: 28.86 KG/M2 | SYSTOLIC BLOOD PRESSURE: 127 MMHG | DIASTOLIC BLOOD PRESSURE: 75 MMHG | HEIGHT: 60 IN | WEIGHT: 147 LBS

## 2022-02-01 PROCEDURE — 99214 OFFICE O/P EST MOD 30 MIN: CPT

## 2022-02-01 RX ORDER — MAGNESIUM 200 MG
200 TABLET ORAL
Refills: 0 | Status: DISCONTINUED | COMMUNITY
End: 2022-02-01

## 2022-02-01 RX ORDER — SUCRALFATE 1 G/1
1 TABLET ORAL
Qty: 180 | Refills: 2 | Status: DISCONTINUED | COMMUNITY
Start: 2021-11-22 | End: 2022-02-01

## 2022-02-01 RX ORDER — PANTOPRAZOLE 40 MG/1
40 TABLET, DELAYED RELEASE ORAL
Qty: 180 | Refills: 0 | Status: DISCONTINUED | COMMUNITY
Start: 2019-12-23 | End: 2022-02-01

## 2022-02-01 RX ORDER — PNV NO.95/FERROUS FUM/FOLIC AC 28MG-0.8MG
TABLET ORAL
Refills: 0 | Status: DISCONTINUED | COMMUNITY
End: 2022-02-01

## 2022-02-01 RX ORDER — ESTRADIOL 10 UG/1
10 TABLET VAGINAL
Qty: 30 | Refills: 6 | Status: DISCONTINUED | COMMUNITY
Start: 2021-07-12 | End: 2022-02-01

## 2022-02-01 RX ORDER — SUCRALFATE 1 G/1
1 TABLET ORAL
Qty: 180 | Refills: 0 | Status: DISCONTINUED | COMMUNITY
Start: 2021-07-03 | End: 2022-02-01

## 2022-02-01 RX ORDER — ESTRADIOL 10 UG/1
10 TABLET, FILM COATED VAGINAL
Qty: 38 | Refills: 3 | Status: DISCONTINUED | COMMUNITY
Start: 2021-07-12 | End: 2022-02-01

## 2022-02-01 RX ORDER — PANTOPRAZOLE 40 MG/1
40 TABLET, DELAYED RELEASE ORAL DAILY
Qty: 90 | Refills: 0 | Status: DISCONTINUED | COMMUNITY
Start: 2021-11-22 | End: 2022-02-01

## 2022-02-01 NOTE — REVIEW OF SYSTEMS
[Numbness] : numbness [Limb Pain] : limb pain [As Noted in HPI] : as noted in HPI [Negative] : Musculoskeletal

## 2022-02-03 NOTE — DATA REVIEWED
[de-identified] : \par  [place] MRI Report             Final\par \par No Documents Attached\par \par \par \par \par   [facility]\par                                          [address]\par                                    [place], [place]  [address]\par                                        Department of Radiology\par                                             [phone]\par \par \par Patient Name:      JARAD RAMIREZ                Location:       [facility]\par Med Rec #:        [id number]                    Account #:      [id number]\par YOB: 1945                    Ordering:       Angel Leary MD\par Age: 74               Sex:    F                 Attending:      Angel Leary MD\par PCP:        NO PRIMARY CARE PHYSICIAN\par ______________________________________________________________________________________\par \par Exam Date:      12/24/19\par Exam:         MRI BRAIN WAW IC\par Order#:       [id number]\par \par \par \par INDICATION: Left facial droop and left eyelid ptosis. Frequent falls.\par \par  TECHNIQUE: MRI of the brain was performed without and with contrast. 7 mL of Gadavist was\par administered intravenously. 0 mL was discarded.\par \par  COMPARISON: None.\par \par  FINDINGS:\par  There is no evidence of acute infarct, acute intracranial hemorrhage, mass effect or hydrocephalus.\par No extra-axial collection. Basal cisterns are patent.\par \par  No brain parenchymal signal abnormality.\par \par  There is no abnormal intracranial enhancement.\par \par  Ventricles and sulci are age-appropriate in size.\par \par  Major intracranial flow-voids are preserved.\par \par  Bilateral cataract surgery. The orbits, sellar and suprasellar structures, and craniocervical\par junction are otherwise unremarkable. Mucosal thickening involving the left maxillary sinus. Visu\par alized paranasal sinuses and mastoid air cells are otherwise clear.\par \par \par  IMPRESSION:\par  Unremarkable study.\par \par ***Electronically Signed ***\par -----------------------------------------------\par Ananth Masters MD              12/26/19 1415\par \par Dictated on 12/24/19 1455\par \par \par Report cc:  Angel Leary MD;\par \par  \par \par  Ordered by: ANGEL LEARY       Collected/Examined: 24Dec2019 01:18PM       \par Verified by: ANGEL LEARY 26Dec2019 02:37PM       \par  Result Communication: No patient communication needed at this time;\par Stage: Final       \par  Performed at: [facility]       Resulted: 24Dec2019 02:55PM       Last Updated: 26Dec2019 02:37PM       Accession: [id number]

## 2022-02-03 NOTE — ASSESSMENT
[FreeTextEntry1] : She will continue nonpharmacologic measures such as massaging using Epsom salt baths and exercise.  She is trying her very best from this perspective.  \par \par She will continue tramadol 50 mg 3 times a day.  \par \par She is willing to try a low-dose Topamax.\par Side effects include, but are not limited to, decreased appetite, weight loss, numbness and tingling in the extremities, increased risk of kidney stones, word finding difficulties, and fatigue.\par \par She tells me that she has been on Lamictal in the past and it caused abnormal involuntary movements.\par I would consider amitriptyline or Cymbalta but we will not do this without discussion with her psychiatrist.\par In the past despite discussion with psychiatrist, she chooses not to try medications because she is very afraid that it will potentially worsen her psychiatric condition or cause side effects.\par \par She has to use a cane for longer distances.  She may get physical therapy but is being very careful due to being immune compromised.\par \par Encouraged her to follow-up with Dr. Clarke.\par \par If she has swelling issues again, I would consider trying her on Cymbalta. Would need to discuss with Dr. Epifanio Saba her psychiatrist [phone])\par

## 2022-02-03 NOTE — HISTORY OF PRESENT ILLNESS
[Home] : at home, [unfilled] , at the time of the visit. [Medical Office: ([facility])___] : at the medical office located in  [Verbal consent obtained from patient] : the patient, [unfilled] [FreeTextEntry1] : 2/1/22\par Finished with chemo for now.\par \par Continuing to have severe burning pain in her feet.\par Numb in entire left leg up to groin.\par Notes symptoms worsen with activity.\par Zapping pain and severe leg jolts.\par \par Chronic low back pain.\par Pain MD recommend spinal cord stimulator.\par \par She tolerates tramadol three times a day but only gets relief for 3 hours at a time.\par She could not tolerate Lamictal or Lyrica.  She did not do well on gabapentin.  Because of her other psychiatric diagnoses, we have to be careful about introduction of SSRIs or tricyclic antidepressants.\par \par \par 7/12/21\par Tana continues to have severe burning pain in her feet.  The pain is worse at night.  She is beginning chemotherapy on the 23rd for CLL.\par She also notes a lot of numbness in her left hand.  Finger brace made it worse.  There is a left ganglion cyst.  She has seen her hand surgeon.\par She has seen pain management and they recommend spinal cord stimulator.\par \par The pain is worse at night.  At present she is declining any intervention as her chemotherapy takes precedence over other interventions.\par \par She tolerates tramadol twice a day but only gets relief for 3 hours at a time.\par She could not tolerate Lamictal or Lyrica.  She did not do well on gabapentin.  Because of her other psychiatric diagnoses, we have to be careful about introduction of SSRIs or tricyclic antidepressants.\par \par 1/5/2021\par Patient being seen for follow up. She continues to have significant neuropathic pain. Discontinued Lyrica because she didn't think it was working. It did work initially. Increasing the dose didn't help her. She is trying to eat well and exercise regularly. She is walking. She describes numbness in her right leg up to her ankle. The left leg is numb up to the thigh. She had to massage it often. The tramadol with Tylenol has been beneficial and provides her 3 hours of relief at a time.\par With exercise balance has improved.\par \par She has been hesitant to add other medications but is willing to try Lamictal at this time.\par 9/2/2020\par The patient is here in followup. She is accompanied to this visit by her daughter. She continues to have significant numbness and tingling in her feet. She also has significant burning. Putting pressure on her feet makes it much worse especially when she walks.\par Patient has been isolating in her home stating her. Apparently she did have 4-5 tick bites. She started noticing excessive swelling involving her feet. Because of these and other symptoms which included a left facial droop she went to see her primary care physician. Dr. Elizondo removed a tick from her back. She was Lyme positive. She had developed some pleurisy. She was treated with doxycycline and Advil. Once her infection was treated, she noted swelling in her feet improved considerably. \par \par When I had first started on Lyrica, she noted tremendous improvement in her pain. She had been holding off the Lyrica because she thought that was causing her neuropathic symptoms and swollen feet.\par \par Gabapentin caused her headaches.\par \par Following closes with psychiatrist- currently taking Seroquel and Klonopin. Bipolar disease under good control.\par 2/4/2020\par Here in follow up. We discussed EMG results. She does have moderate peripheral neuropathy. Her strength is actually intact. She began Lyrica last week. Since beginning at, she notices at least a 50% improvement in her pain. She is able to walk better. Even the numbness is somewhat improved.\par \par 12/20/19\par This is a pleasant 74-year-old right-handed woman who is being seen in neurologic consultation for evaluation of numbness and pain in her feet. Patient states that the numbness and tingling has been present for greater than a year but of late has been getting very intense. She describes constantly having cold feet. There is intense tingling. There is a lot of pins and needles and pain in her feet. It is present day and night. It tends to bother her more at night because she cannot sleep. She is endorsing intense muscle spasms and cramps.She has been losing balance and falling more frequently. Patient also states that she has noticed her left eyelid drooping. She also has a facial droop and thinks it has been present for greater than a year. She does not recall any episode of Bell's palsy.

## 2022-02-16 ENCOUNTER — APPOINTMENT (OUTPATIENT)
Dept: HEMATOLOGY ONCOLOGY | Facility: CLINIC | Age: 77
End: 2022-02-16
Payer: MEDICARE

## 2022-02-18 ENCOUNTER — RESULT REVIEW (OUTPATIENT)
Age: 77
End: 2022-02-18

## 2022-02-18 ENCOUNTER — APPOINTMENT (OUTPATIENT)
Dept: HEMATOLOGY ONCOLOGY | Facility: CLINIC | Age: 77
End: 2022-02-18
Payer: MEDICARE

## 2022-02-18 VITALS
SYSTOLIC BLOOD PRESSURE: 134 MMHG | BODY MASS INDEX: 25 KG/M2 | HEART RATE: 92 BPM | WEIGHT: 127.31 LBS | RESPIRATION RATE: 18 BRPM | OXYGEN SATURATION: 97 % | HEIGHT: 60 IN | TEMPERATURE: 97.9 F | DIASTOLIC BLOOD PRESSURE: 70 MMHG

## 2022-02-18 PROCEDURE — 36415 COLL VENOUS BLD VENIPUNCTURE: CPT

## 2022-02-18 PROCEDURE — 99215 OFFICE O/P EST HI 40 MIN: CPT | Mod: 25

## 2022-02-18 NOTE — ASSESSMENT
[FreeTextEntry1] : CLL \par Diagnosed in 2012\par Peripheral blood FISH showed 13q del (favorable prognosis)\par IgHV mutated (favorable prognosis)\par Previously cared for by Dr Selina Carver ([facility])\par CT 1/2017 without any lymphadenopathy\par Mcintyre stage 0, low risk\par s/p C6 molina (8/5/21 - 1/19/22)\par Opted against calquence as it was cost prohibitive\par SHe has had an excellent clinical, lab and imaging response to the treatemnt\par reviewed and discussed with the patient\par Labs reviewed, analyzed and discussed\par Completed 6 cycles\par Now on surveillance\par Follow up Q3M and scans Q6-12 months or as needed\par \par Duodenal type follicular lymphoma\par Incidental finding on EGD\par PET/CT (7/1/19) - Left retropharyngeal Lymph node (SUV 6.7)\par Unlikely causing any symptoms\par Repeat EGD (2/2020) showed 3 plaques (max 2.5 cm) with biopsy cw duodenal type follicular lymphoma\par Explained that molina may benefit \par Only way to assess is to do EGD but i would NOT do EGD just for assessing the lesion\par \par COVID\par Encouraged to get booster shot\par Also discussed about Evusheld\par \par Urinary incontinence - improved with increased hydration\par \par Neuropathy\par FOllows  with neurology\par \par s/p Moderna Vaccine in March 2021. \par \par Follow up in 3 months\par CBC, CMP, LDH, iron studies, ferritin\par \par

## 2022-02-18 NOTE — HISTORY OF PRESENT ILLNESS
[de-identified] : 73 year old female presents today for CLL, referred by Dr. Jim.  \par \par Around 2012- She was not feeling well, weakness, fatigue, frequent infections- saw her PCP- who did blood work which showed elevated WBC- was referred to hematologist (Dr Selina Carver) - who diagnosed with CLL- she has been following with her since. Has intermittently had issues, which were not satisfactorily answered. \par She was last seen 4-5 years back. SHe saw Dr Monet another oncologist in the same practice in 2016 who did the FISH panel which showed del 13 q\par SHe decided to switch care and is here to establish care\par \par She continues to be tired. Has to sit after minimal activity\par She does not sleep very well - due to neuropathy - has had tingling numbness, burning bl feet x 3 years\par \par No fevers chills night sweats\par No fatigue, tiredness, apathy\par No appetite loss or weight loss/weight gain\par No chest pain, shortness of breath, palpitation, dizziness\par No abdominal pain, nausea, vomiting, diarrhea, constipation\par No bright red blood per rectum, hematemesis or melena\par No hematuria, dysuria, frequency, urgency\par No headaches, visual changes, focal weakness, tingling, numbness\par  \par s/p IV injectafer on 5/21/19; next dose today\par \par She feels dramatic improvement in her symptoms\par She has more energy, is more awake\par \par She saw Dr Austen Haley (GI) -scheduled for CT, \par Had EGD (5/19/19)- FINAL ENDOSCOPIC DIAGNOSES:\par 1.  Erosive esophagitis with grade A erosions x2 in a patient with a previous fundoplication surgery but intractable symptoms of reflux and regurgitation and occasional dysphagia.\par 2.  Patchy granularity in the descending duodenum, of uncertain significance.\par \par Repeat EGD with Dr Haley (2/5/20)\par 1.  Esophageal ulcer, moderately large hiatal hernia recurrence, and a slight narrowing at the gastroesophageal junction, which was dilated.\par 2.  Multifocal mucosal abnormality with 3 plaques minimum noted in the descending duodenum, and APC treatment was not felt to be an appropriate intervention on the basis of this exam.\par \par Pathology\par A. Lower descending duodenum, biopsy:\par      B-cell lymphoma, follicular type.  (See comment)\par B. Mid descending duodenum, biopsy:\par      B-cell lymphoma, follicular type, low-grade.\par C. Duodenum jejunal junction, biopsy:\par      B-cell lymphoma, follicular type, low-grade.\par D. Stomach, biopsy:\par      Mild chronic gastritis without activity.\par      A Giemsa stain is negative for H. pylori.\par \par CT neck, chest. abdomen and pelvis\par Large bilateral submandibular lymph nodes have mildly increased in size.\par No evidence of acute process.\par Splenomegaly likely unchanged as compared to prior exam\par No evidence of mass within the chest abdomen or pelvis.\par Stable 1 cm left axillary lymph node as compared to prior exam.\par No new adenopathy within the chest abdomen or pelvis\par Stable right upper lobe air cyst\par \par s/p Moderna vacinne in March 2021. \par \par Calquence was cost prohibitive nad patient declined \par  [de-identified] : Patient is seen today for follow up \par s/p C6 molina (8/5/21 - 1/19/22)\par Accompanied by her daughter\par \par She is seen today for follow up\par She and her daughter admit to patient feeling much better since she started the treatment\par She has more energy\par Occasionally has sore throat. \par \par CT neck chest abdomen and pelvis (1/21/22)\par Interval decrease in previously described bilateral adenopathy predominantly involving the\par submandibular station\par No new or suspicious mass or adenopathy within the chest, abdomen or pelvis.\par Decreased/resolved splenomegaly.\par \par

## 2022-02-18 NOTE — CONSULT LETTER
[FreeTextEntry3] : Ron Bennett MD, MPH\par Attending Physician\par Hematology Oncology\par [facility]\par [facility]\par

## 2022-03-01 ENCOUNTER — NON-APPOINTMENT (OUTPATIENT)
Age: 77
End: 2022-03-01

## 2022-03-03 ENCOUNTER — APPOINTMENT (OUTPATIENT)
Dept: GASTROENTEROLOGY | Facility: CLINIC | Age: 77
End: 2022-03-03
Payer: MEDICARE

## 2022-03-03 VITALS
TEMPERATURE: 97 F | HEART RATE: 87 BPM | OXYGEN SATURATION: 98 % | WEIGHT: 146 LBS | BODY MASS INDEX: 28.66 KG/M2 | HEIGHT: 60 IN | DIASTOLIC BLOOD PRESSURE: 80 MMHG | SYSTOLIC BLOOD PRESSURE: 140 MMHG

## 2022-03-03 DIAGNOSIS — Z87.19 OTHER SPECIFIED POSTPROCEDURAL STATES: ICD-10-CM

## 2022-03-03 DIAGNOSIS — Z98.890 OTHER SPECIFIED POSTPROCEDURAL STATES: ICD-10-CM

## 2022-03-03 PROCEDURE — 99214 OFFICE O/P EST MOD 30 MIN: CPT

## 2022-03-04 ENCOUNTER — APPOINTMENT (OUTPATIENT)
Dept: DISASTER EMERGENCY | Facility: CLINIC | Age: 77
End: 2022-03-04

## 2022-03-05 NOTE — HISTORY OF PRESENT ILLNESS
[FreeTextEntry1] : in office visit for this nice patient with CLL and also Follicular Lymphoma of the GI tract, found on egd in 2019, in the duodenum\par \par June 2021, erosive esophagitis above a three cm hh\par hx of fundo plication surgery\par \par difficulty with swallowing, for three to four months,\par getting worse\par \par cervical pain, upon swallowing\par \par odynophagia\par \par she could not get prescription for dexilant filled...\par \par we tried several times..\par she might be able to get rx filled for one month, through [facility]..\par \par

## 2022-03-05 NOTE — ASSESSMENT
[FreeTextEntry1] : needs egd and possible balloon dilatation of stricture\par having frequent dysphagia\par food hesitates for a few minutes\par \par also to reassess the status of her follicular lymphoma\par we want to try to get authorization for dexilant\par \par taking carafate tablets bid\par which we will continue\par \par More than 50% of the face to face time was devoted to counseling and /or coordination of care.  THis coordination of care may have included reviewing other medical notes and reports, and communicating with other health professionals\par AS WE OBTAIN INFORMED CONSENT FOR COLONOSCOPY, UPPER ENDOSCOPY [EGD], OR BOTH PROCEDURES TOGETHER;\par \par As with all procedures, there are risks of which the patient should be aware\par \par 1.  Anesthesia; deep sedation with Propofol;  there is a small risk of aspiration and pulmonary infection.  The Anesthesiologist meets with the patient the morning of the procedure to discuss in more detail\par \par 2.  risk of bleeding; from removal of a polyp, or rarely, from biopsies, 1 % or less\par \par 3.  risk of injury or perforation of the colon or upper GI tract; one in a thousand or less,  from removing a polyp or from advancing the instrument\par \par \par

## 2022-03-07 ENCOUNTER — RESULT REVIEW (OUTPATIENT)
Age: 77
End: 2022-03-07

## 2022-03-09 ENCOUNTER — RESULT REVIEW (OUTPATIENT)
Age: 77
End: 2022-03-09

## 2022-03-10 ENCOUNTER — APPOINTMENT (OUTPATIENT)
Dept: GASTROENTEROLOGY | Facility: HOSPITAL | Age: 77
End: 2022-03-10

## 2022-04-11 PROBLEM — Z11.59 SCREENING FOR VIRAL DISEASE: Status: ACTIVE | Noted: 2020-08-20

## 2022-04-19 ENCOUNTER — LABORATORY RESULT (OUTPATIENT)
Age: 77
End: 2022-04-19

## 2022-04-19 ENCOUNTER — APPOINTMENT (OUTPATIENT)
Dept: INTERNAL MEDICINE | Facility: CLINIC | Age: 77
End: 2022-04-19
Payer: MEDICARE

## 2022-04-19 VITALS
BODY MASS INDEX: 28.66 KG/M2 | OXYGEN SATURATION: 98 % | WEIGHT: 146 LBS | SYSTOLIC BLOOD PRESSURE: 110 MMHG | DIASTOLIC BLOOD PRESSURE: 68 MMHG | HEIGHT: 60 IN | HEART RATE: 101 BPM

## 2022-04-19 DIAGNOSIS — Z86.19 PERSONAL HISTORY OF OTHER INFECTIOUS AND PARASITIC DISEASES: ICD-10-CM

## 2022-04-19 DIAGNOSIS — L08.9 LOCAL INFECTION OF THE SKIN AND SUBCUTANEOUS TISSUE, UNSPECIFIED: ICD-10-CM

## 2022-04-19 PROCEDURE — 36415 COLL VENOUS BLD VENIPUNCTURE: CPT

## 2022-04-19 PROCEDURE — 99213 OFFICE O/P EST LOW 20 MIN: CPT | Mod: 25

## 2022-04-20 PROBLEM — Z86.19 HISTORY OF LYME DISEASE: Status: RESOLVED | Noted: 2020-08-28 | Resolved: 2022-04-20

## 2022-04-20 NOTE — HISTORY OF PRESENT ILLNESS
[FreeTextEntry1] : tick bite  [de-identified] : tick bite\par \par 2. 2 weeks ago, patient is complaining of night sweats and fatigue

## 2022-04-26 ENCOUNTER — APPOINTMENT (OUTPATIENT)
Dept: INTERNAL MEDICINE | Facility: CLINIC | Age: 77
End: 2022-04-26
Payer: MEDICARE
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
## 2022-04-26 PROCEDURE — 36415 COLL VENOUS BLD VENIPUNCTURE: CPT
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
## 2022-04-29 LAB
A PHAGOCYTOPH IGG TITR SER IF: ABNORMAL TITER
B BURGDOR AB SER QL IA: POSITIVE
B BURGDOR DNA SPEC QL NAA+PROBE: NEGATIVE
B MICROTI DNA BLD QL NAA+PROBE: NORMAL
B MICROTI IGG TITR SER: ABNORMAL TITER
E CHAFFEENSIS IGG TITR SER IF: NORMAL TITER

## 2022-05-03 ENCOUNTER — APPOINTMENT (OUTPATIENT)
Dept: NEUROLOGY | Facility: CLINIC | Age: 77
End: 2022-05-03

## 2022-05-05 ENCOUNTER — NON-APPOINTMENT (OUTPATIENT)
Age: 77
End: 2022-05-05

## 2022-05-05 ENCOUNTER — APPOINTMENT (OUTPATIENT)
Dept: NEUROLOGY | Facility: CLINIC | Age: 77
End: 2022-05-05
Payer: MEDICARE

## 2022-05-05 DIAGNOSIS — L29.9 PRURITUS, UNSPECIFIED: ICD-10-CM

## 2022-05-05 PROCEDURE — 99214 OFFICE O/P EST MOD 30 MIN: CPT

## 2022-05-05 PROCEDURE — 95911 NRV CNDJ TEST 9-10 STUDIES: CPT

## 2022-05-05 PROCEDURE — 95886 MUSC TEST DONE W/N TEST COMP: CPT

## 2022-05-05 NOTE — REVIEW OF SYSTEMS
[As Noted in HPI] : as noted in HPI [Numbness] : numbness [Limb Pain] : limb pain [Negative] : Heme/Lymph

## 2022-05-09 NOTE — HISTORY OF PRESENT ILLNESS
[Home] : at home, [unfilled] , at the time of the visit. [Medical Office: ([facility])___] : at the medical office located in  [Verbal consent obtained from patient] : the patient, [unfilled] [FreeTextEntry1] : 5/5/22\par Here in f/u. \par \par Still has burning pain.\par Numbness in both legs\par no weakness in legs.\par Walks daily and does elliptical.\par \par Tolerating Tramadol but pain controlled only for 3 hours\par \par 2/1/22\par Finished with chemo for now.\par \par Continuing to have severe burning pain in her feet.\par Numb in entire left leg up to groin.\par Notes symptoms worsen with activity.\par Zapping pain and severe leg jolts.\par \par Chronic low back pain.\par Pain MD recommend spinal cord stimulator.\par \par She tolerates tramadol three times a day but only gets relief for 3 hours at a time.\par She could not tolerate Lamictal or Lyrica.  She did not do well on gabapentin.  Because of her other psychiatric diagnoses, we have to be careful about introduction of SSRIs or tricyclic antidepressants.\par \par \par 7/12/21\par Tana continues to have severe burning pain in her feet.  The pain is worse at night.  She is beginning chemotherapy on the 23rd for CLL.\par She also notes a lot of numbness in her left hand.  Finger brace made it worse.  There is a left ganglion cyst.  She has seen her hand surgeon.\par She has seen pain management and they recommend spinal cord stimulator.\par \par The pain is worse at night.  At present she is declining any intervention as her chemotherapy takes precedence over other interventions.\par \par She tolerates tramadol twice a day but only gets relief for 3 hours at a time.\par She could not tolerate Lamictal or Lyrica.  She did not do well on gabapentin.  Because of her other psychiatric diagnoses, we have to be careful about introduction of SSRIs or tricyclic antidepressants.\par \par 1/5/2021\par Patient being seen for follow up. She continues to have significant neuropathic pain. Discontinued Lyrica because she didn't think it was working. It did work initially. Increasing the dose didn't help her. She is trying to eat well and exercise regularly. She is walking. She describes numbness in her right leg up to her ankle. The left leg is numb up to the thigh. She had to massage it often. The tramadol with Tylenol has been beneficial and provides her 3 hours of relief at a time.\par With exercise balance has improved.\par \par She has been hesitant to add other medications but is willing to try Lamictal at this time.\par 9/2/2020\par The patient is here in followup. She is accompanied to this visit by her daughter. She continues to have significant numbness and tingling in her feet. She also has significant burning. Putting pressure on her feet makes it much worse especially when she walks.\par Patient has been isolating in her home stating her. Apparently she did have 4-5 tick bites. She started noticing excessive swelling involving her feet. Because of these and other symptoms which included a left facial droop she went to see her primary care physician. Dr. Elizondo removed a tick from her back. She was Lyme positive. She had developed some pleurisy. She was treated with doxycycline and Advil. Once her infection was treated, she noted swelling in her feet improved considerably. \par \par When I had first started on Lyrica, she noted tremendous improvement in her pain. She had been holding off the Lyrica because she thought that was causing her neuropathic symptoms and swollen feet.\par \par Gabapentin caused her headaches.\par \par Following closes with psychiatrist- currently taking Seroquel and Klonopin. Bipolar disease under good control.\par 2/4/2020\par Here in follow up. We discussed EMG results. She does have moderate peripheral neuropathy. Her strength is actually intact. She began Lyrica last week. Since beginning at, she notices at least a 50% improvement in her pain. She is able to walk better. Even the numbness is somewhat improved.\par \par 12/20/19\par This is a pleasant 74-year-old right-handed woman who is being seen in neurologic consultation for evaluation of numbness and pain in her feet. Patient states that the numbness and tingling has been present for greater than a year but of late has been getting very intense. She describes constantly having cold feet. There is intense tingling. There is a lot of pins and needles and pain in her feet. It is present day and night. It tends to bother her more at night because she cannot sleep. She is endorsing intense muscle spasms and cramps.She has been losing balance and falling more frequently. Patient also states that she has noticed her left eyelid drooping. She also has a facial droop and thinks it has been present for greater than a year. She does not recall any episode of Bell's palsy.

## 2022-05-09 NOTE — ASSESSMENT
[FreeTextEntry1] : She will continue nonpharmacologic measures such as massaging using Epsom salt baths and exercise.  She is trying her very best from this perspective.  \par \par Increase Tramadol 50 mg 4 times a day.  \par \par Continue Topamax.?\par Side effects include, but are not limited to, decreased appetite, weight loss, numbness and tingling in the extremities, increased risk of kidney stones, word finding difficulties, and fatigue.\par \par She has to use a cane for longer distances.  \par \par Encouraged her to follow-up with Dr. Clarke.\par \par If she has swelling issues again, I would consider trying her on Cymbalta. Would need to discuss with Dr. Epifanio Saba her psychiatrist ([phone])\par

## 2022-05-09 NOTE — PHYSICAL EXAM
[FreeTextEntry1] : Physical examination \par General: No acute distress, Awake, Alert. \par \par Mental status \par Awake, alert, and oriented to person, time and place, Normal attention span and concentration, Recent and remote memory intact, Language intact, Fund of knowledge intact. \par Cranial Nerves \par II: VFF \par III, IV, VI: PERRL, EOMI. ?mild left eyelid ptosis\par V: Facial sensation is normal on the right, dec on the left.\par VII: dec NL fold on the left\par VIII: Gross hearing is intact. \par IX, X: Palate is midline and elevates symmetrically. \par XI: Trapezius normal strength. \par XII: Tongue midline without atrophy or fasciculations. \par \par Motor\par 5/5 \par \par Refelexes\par 1+ upper ext\par \par absent at knees and ankles\par downgoing toes\par \par Sensory\par Impaired vibration\par impaired proprioception\par Romberg present\par \par Gait\par cautious

## 2022-05-09 NOTE — ASSESSMENT
[FreeTextEntry1] : She will continue nonpharmacologic measures such as massaging using Epsom salt baths and exercise.  She is trying her very best from this perspective.  \par \par Increase Tramadol 50 mg 4 times a day.  \par \par Continue Topamax.?\par Side effects include, but are not limited to, decreased appetite, weight loss, numbness and tingling in the extremities, increased risk of kidney stones, word finding difficulties, and fatigue.\par \par She has to use a cane for longer distances.  \par \par Encouraged her to follow-up with Dr. Clarke.\par \par If she has swelling issues again, I would consider trying her on Cymbalta. Would need to discuss with Dr. Epifanio Saba her psychiatrist ([phone])\par

## 2022-05-09 NOTE — DATA REVIEWED
[de-identified] : \par  [facility] MRI Report             Final\par \par No Documents Attached\par \par \par \par \par   [facility]\par                                          [address]\par                                    [place], [place]  [address]\par                                        Department of Radiology\par                                             [phone]\par \par \par Patient Name:      JARAD RAMIREZ                Location:       [facility]\par Med Rec #:        [id number]                    Account #:      [id number]\par YOB: 1945                    Ordering:       Angel Leary MD\par Age: 74               Sex:    F                 Attending:      Angel Leary MD\par PCP:        NO PRIMARY CARE PHYSICIAN\par ______________________________________________________________________________________\par \par Exam Date:      12/24/19\par Exam:         MRI BRAIN WAW IC\par Order#:       [id number]\par \par \par \par INDICATION: Left facial droop and left eyelid ptosis. Frequent falls.\par \par  TECHNIQUE: MRI of the brain was performed without and with contrast. 7 mL of Gadavist was\par administered intravenously. 0 mL was discarded.\par \par  COMPARISON: None.\par \par  FINDINGS:\par  There is no evidence of acute infarct, acute intracranial hemorrhage, mass effect or hydrocephalus.\par No extra-axial collection. Basal cisterns are patent.\par \par  No brain parenchymal signal abnormality.\par \par  There is no abnormal intracranial enhancement.\par \par  Ventricles and sulci are age-appropriate in size.\par \par  Major intracranial flow-voids are preserved.\par \par  Bilateral cataract surgery. The orbits, sellar and suprasellar structures, and craniocervical\par junction are otherwise unremarkable. Mucosal thickening involving the left maxillary sinus. Visu\par alized paranasal sinuses and mastoid air cells are otherwise clear.\par \par \par  IMPRESSION:\par  Unremarkable study.\par \par ***Electronically Signed ***\par -----------------------------------------------\par Ananth Masters MD              12/26/19 1415\par \par Dictated on 12/24/19 1455\par \par \par Report cc:  Angel Leary MD;\par \par  \par \par  Ordered by: ANGEL LEARY       Collected/Examined: 24Dec2019 01:18PM       \par Verified by: ANGEL LEARY 26Dec2019 02:37PM       \par  Result Communication: No patient communication needed at this time;\par Stage: Final       \par  Performed at: [facility]       Resulted: 24Dec2019 02:55PM       Last Updated: 26Dec2019 02:37PM       Accession: [id number]

## 2022-05-09 NOTE — DATA REVIEWED
[de-identified] : \par  [place] MRI Report             Final\par \par No Documents Attached\par \par \par \par \par   [facility]\par                                          [address]\par                                    [place], [place]  [address]\par                                        Department of Radiology\par                                             [phone]\par \par \par Patient Name:      JARAD RAMIREZ                Location:       [facility]\par Med Rec #:        [id number]                    Account #:      [id number]\par YOB: 1945                    Ordering:       Angel Leary MD\par Age: 74               Sex:    F                 Attending:      Angel Leary MD\par PCP:        NO PRIMARY CARE PHYSICIAN\par ______________________________________________________________________________________\par \par Exam Date:      12/24/19\par Exam:         MRI BRAIN WAW IC\par Order#:       MRI [id number]\par \par \par \par INDICATION: Left facial droop and left eyelid ptosis. Frequent falls.\par \par  TECHNIQUE: MRI of the brain was performed without and with contrast. 7 mL of Gadavist was\par administered intravenously. 0 mL was discarded.\par \par  COMPARISON: None.\par \par  FINDINGS:\par  There is no evidence of acute infarct, acute intracranial hemorrhage, mass effect or hydrocephalus.\par No extra-axial collection. Basal cisterns are patent.\par \par  No brain parenchymal signal abnormality.\par \par  There is no abnormal intracranial enhancement.\par \par  Ventricles and sulci are age-appropriate in size.\par \par  Major intracranial flow-voids are preserved.\par \par  Bilateral cataract surgery. The orbits, sellar and suprasellar structures, and craniocervical\par junction are otherwise unremarkable. Mucosal thickening involving the left maxillary sinus. Visu\par alized paranasal sinuses and mastoid air cells are otherwise clear.\par \par \par  IMPRESSION:\par  Unremarkable study.\par \par ***Electronically Signed ***\par -----------------------------------------------\par Ananth Masters MD              12/26/19 1415\par \par Dictated on 12/24/19 1455\par \par \par Report cc:  Angel Leary MD;\par \par  \par \par  Ordered by: ANGEL LEARY       Collected/Examined: 24Dec2019 01:18PM       \par Verified by: ANGEL LEARY 26Dec2019 02:37PM       \par  Result Communication: No patient communication needed at this time;\par Stage: Final       \par  Performed at: [facility]       Resulted: 24Dec2019 02:55PM       Last Updated: 26Dec2019 02:37PM       Accession: [id number]

## 2022-05-09 NOTE — HISTORY OF PRESENT ILLNESS
[Home] : at home, [unfilled] , at the time of the visit. [Medical Office: ([facility])___] : at the medical office located in  [Verbal consent obtained from patient] : the patient, [unfilled] [FreeTextEntry1] : 5/5/22\par Here in f/u. \par \par Still has burning pain.\par Numbness in both legs\par no weakness in legs.\par Walks daily and does elliptical.\par \par Tolerating Tramadol but pain controlled only for 3 hours\par \par 2/1/22\par Finished with chemo for now.\par \par Continuing to have severe burning pain in her feet.\par Numb in entire left leg up to groin.\par Notes symptoms worsen with activity.\par Zapping pain and severe leg jolts.\par \par Chronic low back pain.\par Pain MD recommend spinal cord stimulator.\par \par She tolerates tramadol three times a day but only gets relief for 3 hours at a time.\par She could not tolerate Lamictal or Lyrica.  She did not do well on gabapentin.  Because of her other psychiatric diagnoses, we have to be careful about introduction of SSRIs or tricyclic antidepressants.\par \par \par 7/12/21\par Tana continues to have severe burning pain in her feet.  The pain is worse at night.  She is beginning chemotherapy on the 23rd for CLL.\par She also notes a lot of numbness in her left hand.  Finger brace made it worse.  There is a left ganglion cyst.  She has seen her hand surgeon.\par She has seen pain management and they recommend spinal cord stimulator.\par \par The pain is worse at night.  At present she is declining any intervention as her chemotherapy takes precedence over other interventions.\par \par She tolerates tramadol twice a day but only gets relief for 3 hours at a time.\par She could not tolerate Lamictal or Lyrica.  She did not do well on gabapentin.  Because of her other psychiatric diagnoses, we have to be careful about introduction of SSRIs or tricyclic antidepressants.\par \par 1/5/2021\par Patient being seen for follow up. She continues to have significant neuropathic pain. Discontinued Lyrica because she didn't think it was working. It did work initially. Increasing the dose didn't help her. She is trying to eat well and exercise regularly. She is walking. She describes numbness in her right leg up to her ankle. The left leg is numb up to the thigh. She had to massage it often. The tramadol with Tylenol has been beneficial and provides her 3 hours of relief at a time.\par With exercise balance has improved.\par \par She has been hesitant to add other medications but is willing to try Lamictal at this time.\par 9/2/2020\par The patient is here in followup. She is accompanied to this visit by her daughter. She continues to have significant numbness and tingling in her feet. She also has significant burning. Putting pressure on her feet makes it much worse especially when she walks.\par Patient has been isolating in her home stating her. Apparently she did have 4-5 tick bites. She started noticing excessive swelling involving her feet. Because of these and other symptoms which included a left facial droop she went to see her primary care physician. Dr. Elizondo removed a tick from her back. She was Lyme positive. She had developed some pleurisy. She was treated with doxycycline and Advil. Once her infection was treated, she noted swelling in her feet improved considerably. \par \par When I had first started on Lyrica, she noted tremendous improvement in her pain. She had been holding off the Lyrica because she thought that was causing her neuropathic symptoms and swollen feet.\par \par Gabapentin caused her headaches.\par \par Following closes with psychiatrist- currently taking Seroquel and Klonopin. Bipolar disease under good control.\par 2/4/2020\par Here in follow up. We discussed EMG results. She does have moderate peripheral neuropathy. Her strength is actually intact. She began Lyrica last week. Since beginning at, she notices at least a 50% improvement in her pain. She is able to walk better. Even the numbness is somewhat improved.\par \par 12/20/19\par This is a pleasant 74-year-old right-handed woman who is being seen in neurologic consultation for evaluation of numbness and pain in her feet. Patient states that the numbness and tingling has been present for greater than a year but of late has been getting very intense. She describes constantly having cold feet. There is intense tingling. There is a lot of pins and needles and pain in her feet. It is present day and night. It tends to bother her more at night because she cannot sleep. She is endorsing intense muscle spasms and cramps.She has been losing balance and falling more frequently. Patient also states that she has noticed her left eyelid drooping. She also has a facial droop and thinks it has been present for greater than a year. She does not recall any episode of Bell's palsy.

## 2022-05-11 RX ORDER — TOPIRAMATE 25 MG/1
25 TABLET, FILM COATED ORAL AT BEDTIME
Qty: 90 | Refills: 0 | Status: DISCONTINUED | COMMUNITY
Start: 2022-02-01 | End: 2022-05-11

## 2022-05-20 ENCOUNTER — APPOINTMENT (OUTPATIENT)
Dept: HEMATOLOGY ONCOLOGY | Facility: CLINIC | Age: 77
End: 2022-05-20
Payer: MEDICARE

## 2022-05-20 ENCOUNTER — APPOINTMENT (OUTPATIENT)
Dept: HEART AND VASCULAR | Facility: CLINIC | Age: 77
End: 2022-05-20
Payer: MEDICARE

## 2022-05-20 ENCOUNTER — NON-APPOINTMENT (OUTPATIENT)
Age: 77
End: 2022-05-20

## 2022-05-20 ENCOUNTER — RESULT REVIEW (OUTPATIENT)
Age: 77
End: 2022-05-20

## 2022-05-20 ENCOUNTER — LABORATORY RESULT (OUTPATIENT)
Age: 77
End: 2022-05-20

## 2022-05-20 VITALS
WEIGHT: 146 LBS | HEIGHT: 60 IN | SYSTOLIC BLOOD PRESSURE: 130 MMHG | BODY MASS INDEX: 28.66 KG/M2 | DIASTOLIC BLOOD PRESSURE: 72 MMHG | TEMPERATURE: 97.9 F | OXYGEN SATURATION: 99 % | RESPIRATION RATE: 16 BRPM | HEART RATE: 88 BPM

## 2022-05-20 VITALS
DIASTOLIC BLOOD PRESSURE: 70 MMHG | SYSTOLIC BLOOD PRESSURE: 110 MMHG | BODY MASS INDEX: 28.66 KG/M2 | HEART RATE: 73 BPM | HEIGHT: 60 IN | WEIGHT: 146 LBS

## 2022-05-20 DIAGNOSIS — A69.20 LYME DISEASE, UNSPECIFIED: ICD-10-CM

## 2022-05-20 DIAGNOSIS — R94.39 ABNORMAL RESULT OF OTHER CARDIOVASCULAR FUNCTION STUDY: ICD-10-CM

## 2022-05-20 PROCEDURE — 99214 OFFICE O/P EST MOD 30 MIN: CPT | Mod: 25

## 2022-05-20 PROCEDURE — 93000 ELECTROCARDIOGRAM COMPLETE: CPT

## 2022-05-20 PROCEDURE — 36415 COLL VENOUS BLD VENIPUNCTURE: CPT

## 2022-05-20 PROCEDURE — 99214 OFFICE O/P EST MOD 30 MIN: CPT

## 2022-05-20 RX ORDER — PANTOPRAZOLE 40 MG/1
40 TABLET, DELAYED RELEASE ORAL
Qty: 180 | Refills: 3 | Status: DISCONTINUED | COMMUNITY
Start: 2021-08-03 | End: 2022-05-20

## 2022-05-20 RX ORDER — ONDANSETRON 4 MG/1
4 TABLET, ORALLY DISINTEGRATING ORAL
Qty: 30 | Refills: 3 | Status: DISCONTINUED | COMMUNITY
Start: 2021-07-30 | End: 2022-05-20

## 2022-05-20 NOTE — END OF VISIT
THYROID ASSESSMENT    Visit Vitals  /70 (BP Location: LUE - Left upper extremity, Patient Position: Sitting, Cuff Size: Regular)   Pulse 72   Resp 16   Wt 76.6 kg   BMI 30.87 kg/m²     No LMP recorded. Patient is postmenopausal.  Recent Weight Change: No Tremors: No Heat/Cold Intolerance: No Energy: No Bowel: Constipation Difficulty Swallowing: Yes sometimes Palpitations: No  Hoarseness: Yes Skin / Hair:  No Present Thyroid Medication: No     [Time Spent: ___ minutes] : I have spent [unfilled] minutes of time on the encounter. [>50% of the face to face encounter time was spent on counseling and/or coordination of care for ___] : Greater than 50% of the face to face encounter time was spent on counseling and/or coordination of care for [unfilled]

## 2022-05-20 NOTE — ASSESSMENT
[FreeTextEntry1] : 76F \par Mild AI and MR\par Minimal CAD\par CLL -\par \par \par EKG NSR, septal infarct pattern\par Chest CT (jan 2022) - Major thoracic vessels are normal in appearance. Heart is normal in size. No pericardial effusion. \par TST (jan 2021) - Mild mid sternal chest pain at peak exercise. Normal ST segment response to stress.\par Holter (jan 2021) - Predominantly NSR with rare PACs and PVCs\par Echo (oct 2020) - EF 62% - Normal LV size, function and wall thickness. Mildly increased LA size. Mild AV and MV regurgitation. \par Cardiac CTA (oct 2020) - Ca+ score = 17. Minimal stenosis in prox LAD and D1. Minimal calcification of the AV. \par Nuclear stress test (sept 2020) - mildly positive stress test apical ischemia.\par \par - BP controlled\par - no active cardiac issues\par - imaging reviewed, minimal CAD on CCTA and mild AI/MR on ECHO.  No signs of HF or ischemia.\par - check lipids better assess CV risk

## 2022-05-20 NOTE — CONSULT LETTER
[Dear  ___] : Dear  [unfilled], [Courtesy Letter:] : I had the pleasure of seeing your patient, [unfilled], in my office today. [Please see my note below.] : Please see my note below. [Consult Closing:] : Thank you very much for allowing me to participate in the care of this patient.  If you have any questions, please do not hesitate to contact me. [Sincerely,] : Sincerely, [DrCamilo  ___] : Dr. KIMBROUGH [DrCamilo ___] : Dr. KIMBROUGH [FreeTextEntry3] : Ron Bennett MD, MPH\par Attending Physician\par Hematology Oncology\par [facility]\par [facility]\par

## 2022-05-20 NOTE — ASSESSMENT
[FreeTextEntry1] : CLL \par Diagnosed in 2012\par Peripheral blood FISH showed 13q del (favorable prognosis)\par IgHV mutated (favorable prognosis)\par Previously cared for by Dr Selina Carver ([facility])\par CT 1/2017 without any lymphadenopathy\par Mcintyre stage 0, low risk\par s/p C6 molina (8/5/21 - 1/19/22)\par Opted against calquence as it was cost prohibitive\par SHe has had an excellent clinical, lab and imaging response to the treatemnt\par Completed 6 cycles\par Now on surveillance\par Overall feels good\par Labs drawn in the office, reviewed, analyzed and discussed\par Follow up Q6M and scans Q6-12 months or as needed\par \par Duodenal type follicular lymphoma\par Incidental finding on EGD\par PET/CT (7/1/19) - Left retropharyngeal Lymph node (SUV 6.7)\par Unlikely causing any symptoms\par Repeat EGD (2/2020) showed 3 plaques (max 2.5 cm) with biopsy cw duodenal type follicular lymphoma\par Explained that molina may benefit \par She had repeat EGD with Dr Haley- plaque like lesion in the duodenum has resolved including a 3 cm lesion\par \par COVID\par Encouraged to get booster shot\par Also discussed about Evusheld\par \par Urinary incontinence - improved with increased hydration\par \par Neuropathy\par FOllows  with neurology\par \par s/p Moderna Vaccine in March 2021. \par \par Follow up in 6 months\par CBC, CMP, LDH, iron studies, ferritin\par \par

## 2022-05-20 NOTE — PHYSICAL EXAM
[Ambulatory and capable of all self care but unable to carry out any work activities] : Status 2- Ambulatory and capable of all self care but unable to carry out any work activities. Up and about more than 50% of waking hours [Normal] : affect appropriate [de-identified] : +palpable b/l 'marble' size  submandibular LNs.

## 2022-05-20 NOTE — HISTORY OF PRESENT ILLNESS
[de-identified] : 73 year old female presents today for CLL, referred by Dr. Jim.  \par \par Around 2012- She was not feeling well, weakness, fatigue, frequent infections- saw her PCP- who did blood work which showed elevated WBC- was referred to hematologist (Dr Selina Carver) - who diagnosed with CLL- she has been following with her since. Has intermittently had issues, which were not satisfactorily answered. \par She was last seen 4-5 years back. SHe saw Dr Monet another oncologist in the same practice in 2016 who did the FISH panel which showed del 13 q\par SHe decided to switch care and is here to establish care\par \par She continues to be tired. Has to sit after minimal activity\par She does not sleep very well - due to neuropathy - has had tingling numbness, burning bl feet x 3 years\par \par No fevers chills night sweats\par No fatigue, tiredness, apathy\par No appetite loss or weight loss/weight gain\par No chest pain, shortness of breath, palpitation, dizziness\par No abdominal pain, nausea, vomiting, diarrhea, constipation\par No bright red blood per rectum, hematemesis or melena\par No hematuria, dysuria, frequency, urgency\par No headaches, visual changes, focal weakness, tingling, numbness\par  \par s/p IV injectafer on 5/21/19; next dose today\par \par She feels dramatic improvement in her symptoms\par She has more energy, is more awake\par \par She saw Dr Austen Haley (GI) -scheduled for CT, \par Had EGD (5/19/19)- FINAL ENDOSCOPIC DIAGNOSES:\par 1.  Erosive esophagitis with grade A erosions x2 in a patient with a previous fundoplication surgery but intractable symptoms of reflux and regurgitation and occasional dysphagia.\par 2.  Patchy granularity in the descending duodenum, of uncertain significance.\par \par Repeat EGD with Dr Haley (2/5/20)\par 1.  Esophageal ulcer, moderately large hiatal hernia recurrence, and a slight narrowing at the gastroesophageal junction, which was dilated.\par 2.  Multifocal mucosal abnormality with 3 plaques minimum noted in the descending duodenum, and APC treatment was not felt to be an appropriate intervention on the basis of this exam.\par \par Pathology\par A. Lower descending duodenum, biopsy:\par      B-cell lymphoma, follicular type.  (See comment)\par B. Mid descending duodenum, biopsy:\par      B-cell lymphoma, follicular type, low-grade.\par C. Duodenum jejunal junction, biopsy:\par      B-cell lymphoma, follicular type, low-grade.\par D. Stomach, biopsy:\par      Mild chronic gastritis without activity.\par      A Giemsa stain is negative for H. pylori.\par \par CT neck, chest. abdomen and pelvis\par Large bilateral submandibular lymph nodes have mildly increased in size.\par No evidence of acute process.\par Splenomegaly likely unchanged as compared to prior exam\par No evidence of mass within the chest abdomen or pelvis.\par Stable 1 cm left axillary lymph node as compared to prior exam.\par No new adenopathy within the chest abdomen or pelvis\par Stable right upper lobe air cyst\par \par s/p Moderna vacinne in March 2021. \par \par Calquence was cost prohibitive nad patient declined \par \par CT neck chest abdomen and pelvis (1/21/22)\par Interval decrease in previously described bilateral adenopathy predominantly involving the\par submandibular station\par No new or suspicious mass or adenopathy within the chest, abdomen or pelvis.\par Decreased/resolved splenomegaly.\par \par  [de-identified] : Patient is seen today for follow up \par s/p C6 laureya (8/5/21 - 1/19/22)\par \par She feels good overall except for her neuropathy\par Is able to do more\par

## 2022-05-20 NOTE — HISTORY OF PRESENT ILLNESS
[FreeTextEntry1] : 76 F female with CLL in remission dx 2012, treated up until Feb 2022.  Originally seen by cardiology for atypical chest pain.  Had extensive testing, noted to have minimal CAD on CCTA, unremarkable ECHO.  \par \par Walks all day, does her housekeeping, cooking, goes on elliptical for ~ 15 mins usually twice a day. No chest pain. No leg swelling.  No orthopnea.  No palps.   Has chronic low back pain issues, peripheral neuropathy which is her biggest limiting factor when exerting her self, walking stairs.

## 2022-05-24 LAB
CHOLEST SERPL-MCNC: 252 MG/DL
HDLC SERPL-MCNC: 61 MG/DL
LDLC SERPL CALC-MCNC: 152 MG/DL
NONHDLC SERPL-MCNC: 191 MG/DL
TRIGL SERPL-MCNC: 196 MG/DL

## 2022-06-10 ENCOUNTER — NON-APPOINTMENT (OUTPATIENT)
Age: 77
End: 2022-06-10

## 2022-08-21 ENCOUNTER — RESULT REVIEW (OUTPATIENT)
Age: 77
End: 2022-08-21

## 2022-08-22 ENCOUNTER — APPOINTMENT (OUTPATIENT)
Dept: GASTROENTEROLOGY | Facility: HOSPITAL | Age: 77
End: 2022-08-22

## 2022-08-23 ENCOUNTER — APPOINTMENT (OUTPATIENT)
Dept: GASTROENTEROLOGY | Facility: CLINIC | Age: 77
End: 2022-08-23

## 2022-08-23 PROBLEM — Z98.890 HISTORY OF NISSEN FUNDOPLICATION: Status: ACTIVE | Noted: 2022-03-03

## 2022-08-23 NOTE — HISTORY OF PRESENT ILLNESS
[FreeTextEntry1] : telephonic visit consent on file, audio only patient at home, i am at home, just patient and i , \par \par 1.  severe esophagitis, grade d, status post anti reflux surgery,\par failed surgery\par much improved on dexilant, vs ppi...\par and dysphagia is much less\par last egd showed esophagitis, and stricture.\par \par i have done several dilatations\par \par 2.  follicular lymphoma of descending duodenum..\par patient on therapy with dr Bennett\par \par 3.  no cv disease..\par \par elevated lipids\par \par pcp is dr Ladd..\par

## 2022-08-23 NOTE — ASSESSMENT
[FreeTextEntry1] : patient is much improved..with Dexilant\par \par to have colonoscopy\par \par Dr Bennett is treating her lymphoma of the duodenum\par \par all is improved overall

## 2022-08-24 ENCOUNTER — TRANSCRIPTION ENCOUNTER (OUTPATIENT)
Age: 77
End: 2022-08-24

## 2022-08-24 ENCOUNTER — APPOINTMENT (OUTPATIENT)
Dept: GASTROENTEROLOGY | Facility: HOSPITAL | Age: 77
End: 2022-08-24
Payer: SUBSIDIZED

## 2022-08-24 DIAGNOSIS — Z98.890 OTHER SPECIFIED POSTPROCEDURAL STATES: ICD-10-CM

## 2022-08-24 PROCEDURE — 45378 DIAGNOSTIC COLONOSCOPY: CPT

## 2022-08-30 ENCOUNTER — NON-APPOINTMENT (OUTPATIENT)
Age: 77
End: 2022-08-30

## 2022-09-01 ENCOUNTER — LABORATORY RESULT (OUTPATIENT)
Age: 77
End: 2022-09-01

## 2022-09-01 ENCOUNTER — APPOINTMENT (OUTPATIENT)
Dept: INTERNAL MEDICINE | Facility: CLINIC | Age: 77
End: 2022-09-01
Payer: MEDICARE

## 2022-09-01 VITALS
HEART RATE: 91 BPM | TEMPERATURE: 97.3 F | DIASTOLIC BLOOD PRESSURE: 70 MMHG | HEIGHT: 60 IN | WEIGHT: 146 LBS | BODY MASS INDEX: 28.66 KG/M2 | OXYGEN SATURATION: 98 % | SYSTOLIC BLOOD PRESSURE: 120 MMHG

## 2022-09-01 DIAGNOSIS — M67.40 GANGLION, UNSPECIFIED SITE: ICD-10-CM

## 2022-09-01 DIAGNOSIS — R53.83 OTHER FATIGUE: ICD-10-CM

## 2022-09-01 DIAGNOSIS — Z71.89 OTHER SPECIFIED COUNSELING: ICD-10-CM

## 2022-09-01 DIAGNOSIS — R20.0 ANESTHESIA OF SKIN: ICD-10-CM

## 2022-09-01 DIAGNOSIS — Z87.898 PERSONAL HISTORY OF OTHER SPECIFIED CONDITIONS: ICD-10-CM

## 2022-09-01 PROCEDURE — 99214 OFFICE O/P EST MOD 30 MIN: CPT | Mod: 25

## 2022-09-01 PROCEDURE — 36415 COLL VENOUS BLD VENIPUNCTURE: CPT

## 2022-09-01 RX ORDER — TRAMADOL HYDROCHLORIDE 50 MG/1
50 TABLET, COATED ORAL 4 TIMES DAILY
Qty: 120 | Refills: 1 | Status: DISCONTINUED | COMMUNITY
Start: 2021-02-05 | End: 2022-09-01

## 2022-09-01 RX ORDER — PREGABALIN 50 MG/1
50 CAPSULE ORAL
Qty: 60 | Refills: 2 | Status: DISCONTINUED | COMMUNITY
Start: 2022-03-05 | End: 2022-09-01

## 2022-09-01 RX ORDER — ADHESIVE TAPE 3"X 2.3 YD
50 MCG TAPE, NON-MEDICATED TOPICAL
Refills: 0 | Status: DISCONTINUED | COMMUNITY
End: 2022-09-01

## 2022-09-01 RX ORDER — NITROGLYCERIN 0.4 MG/1
0.4 TABLET SUBLINGUAL
Qty: 15 | Refills: 1 | Status: DISCONTINUED | COMMUNITY
Start: 2021-02-09 | End: 2022-09-01

## 2022-09-01 RX ORDER — ALBUTEROL SULFATE 2.5 MG/3ML
(2.5 MG/3ML) SOLUTION RESPIRATORY (INHALATION)
Qty: 75 | Refills: 0 | Status: DISCONTINUED | COMMUNITY
Start: 2019-01-04 | End: 2022-09-01

## 2022-09-01 RX ORDER — MUPIROCIN 20 MG/G
2 OINTMENT TOPICAL 3 TIMES DAILY
Qty: 1 | Refills: 0 | Status: DISCONTINUED | COMMUNITY
Start: 2022-04-19 | End: 2022-09-01

## 2022-09-01 RX ORDER — DOXYCYCLINE HYCLATE 100 MG/1
100 TABLET ORAL TWICE DAILY
Qty: 28 | Refills: 2 | Status: DISCONTINUED | COMMUNITY
Start: 2022-04-21 | End: 2022-09-01

## 2022-09-01 NOTE — PHYSICAL EXAM
[No Acute Distress] : no acute distress [Well Nourished] : well nourished [Well-Appearing] : well-appearing [No Respiratory Distress] : no respiratory distress  [No Accessory Muscle Use] : no accessory muscle use [Clear to Auscultation] : lungs were clear to auscultation bilaterally [Normal Rate] : normal rate  [Regular Rhythm] : with a regular rhythm [Normal S1, S2] : normal S1 and S2 [No Murmur] : no murmur heard [No Edema] : there was no peripheral edema [Soft] : abdomen soft [Non Tender] : non-tender [Non-distended] : non-distended [Normal Bowel Sounds] : normal bowel sounds [Normal Gait] : normal gait [Normal Affect] : the affect was normal [Alert and Oriented x3] : oriented to person, place, and time [Normal Mood] : the mood was normal [Normal Insight/Judgement] : insight and judgment were intact

## 2022-09-01 NOTE — REVIEW OF SYSTEMS
[Fatigue] : fatigue [Night Sweats] : night sweats [Dysuria] : dysuria [Joint Swelling] : joint swelling [Unsteady Walking] : ataxia [Fever] : no fever [Chills] : no chills [Vision Problems] : no vision problems [Itching] : no itching [Nasal Discharge] : no nasal discharge [Sore Throat] : no sore throat [Postnasal Drip] : no postnasal drip [Chest Pain] : no chest pain [Palpitations] : no palpitations [Lower Ext Edema] : no lower extremity edema [Orthopnea] : no orthopnea [Paroxysmal Nocturnal Dyspnea] : no paroxysmal nocturnal dyspnea [Shortness Of Breath] : no shortness of breath [Abdominal Pain] : no abdominal pain [Nausea] : no nausea [Constipation] : no constipation [Diarrhea] : diarrhea [Vomiting] : no vomiting [Heartburn] : no heartburn [Melena] : no melena [Hematuria] : no hematuria [Frequency] : no frequency [Vaginal Discharge] : no vaginal discharge [Dysmenorrhea] : no dysmenorrhea [Skin Rash] : no skin rash [Dizziness] : no dizziness [Fainting] : no fainting [Insomnia] : no insomnia [Anxiety] : no anxiety [Depression] : no depression [FreeTextEntry2] : max temp during sweats 95  [FreeTextEntry8] : 2 days dysuria  [FreeTextEntry9] : both hands swelling more by nightime long time. NO pain, redness, swelling  [de-identified] : both feet more numb than usual . no change in difficulty walking a long time, using cane  [de-identified] : sleeps maybe 3 hours but in bed 10 hours

## 2022-09-01 NOTE — ASSESSMENT
[FreeTextEntry1] : Fatigue ddx pt ie possible leukemia related, insomnia but will check for Lyme. \par Recommended pt come for annual PE for preventive care since she has not done that yet. \par Instructions discussed with patient. Patient verbalized understanding. \par Labs drawn in office.\par

## 2022-09-01 NOTE — HISTORY OF PRESENT ILLNESS
[FreeTextEntry1] : c/o fatigue : low energy but not sleepy about 2 months. Has leukemia and last seen by oncology March of this year. Dx with Lyme this summer and tx. Worried about Lyme. No rash typical of EM.  [de-identified] : No new psych issues. Goes to psych [id number].

## 2022-09-02 ENCOUNTER — TRANSCRIPTION ENCOUNTER (OUTPATIENT)
Age: 77
End: 2022-09-02

## 2022-09-02 DIAGNOSIS — E55.9 VITAMIN D DEFICIENCY, UNSPECIFIED: ICD-10-CM

## 2022-09-06 ENCOUNTER — RX RENEWAL (OUTPATIENT)
Age: 77
End: 2022-09-06

## 2022-09-06 ENCOUNTER — TRANSCRIPTION ENCOUNTER (OUTPATIENT)
Age: 77
End: 2022-09-06

## 2022-09-06 LAB
25(OH)D3 SERPL-MCNC: 19.6 NG/ML
APPEARANCE: CLEAR
B BURGDOR AB SER-IMP: POSITIVE
B BURGDOR IGG+IGM SER QL: 9.56 INDEX
BASOPHILS # BLD AUTO: 0.07 K/UL
BASOPHILS NFR BLD AUTO: 1.4 %
BILIRUBIN URINE: ABNORMAL
BLOOD URINE: NORMAL
CHOLEST SERPL-MCNC: 211 MG/DL
COLOR: YELLOW
CREAT SPEC-SCNC: 427 MG/DL
EOSINOPHIL # BLD AUTO: 0.12 K/UL
EOSINOPHIL NFR BLD AUTO: 2.5 %
ESTIMATED AVERAGE GLUCOSE: 111 MG/DL
FERRITIN SERPL-MCNC: 267 NG/ML
GLUCOSE QUALITATIVE U: NEGATIVE
HBA1C MFR BLD HPLC: 5.5 %
HCT VFR BLD CALC: 41.4 %
HDLC SERPL-MCNC: 62 MG/DL
HGB BLD-MCNC: 13.4 G/DL
IMM GRANULOCYTES NFR BLD AUTO: 0.8 %
IRON SATN MFR SERPL: 40 %
IRON SERPL-MCNC: 119 UG/DL
KETONES URINE: NORMAL
LDLC SERPL CALC-MCNC: 115 MG/DL
LEUKOCYTE ESTERASE URINE: NEGATIVE
LYMPHOCYTES # BLD AUTO: 0.98 K/UL
LYMPHOCYTES NFR BLD AUTO: 20 %
MAN DIFF?: NORMAL
MCHC RBC-ENTMCNC: 31.2 PG
MCHC RBC-ENTMCNC: 32.4 GM/DL
MCV RBC AUTO: 96.5 FL
MICROALBUMIN 24H UR DL<=1MG/L-MCNC: 8 MG/DL
MICROALBUMIN/CREAT 24H UR-RTO: 19 MG/G
MONOCYTES # BLD AUTO: 0.52 K/UL
MONOCYTES NFR BLD AUTO: 10.6 %
NEUTROPHILS # BLD AUTO: 3.16 K/UL
NEUTROPHILS NFR BLD AUTO: 64.7 %
NITRITE URINE: NEGATIVE
NONHDLC SERPL-MCNC: 149 MG/DL
PH URINE: 6
PLATELET # BLD AUTO: 229 K/UL
PROTEIN URINE: ABNORMAL
RBC # BLD: 4.29 M/UL
RBC # BLD: 4.29 M/UL
RBC # FLD: 12.7 %
RETICS # AUTO: 1.4 %
RETICS AGGREG/RBC NFR: 61.3 K/UL
SPECIFIC GRAVITY URINE: >=1.03
T3FREE SERPL-MCNC: 2.9 PG/ML
T4 FREE SERPL-MCNC: 1.2 NG/DL
TIBC SERPL-MCNC: 299 UG/DL
TRIGL SERPL-MCNC: 165 MG/DL
TSH SERPL-ACNC: 2.97 UIU/ML
UIBC SERPL-MCNC: 180 UG/DL
UROBILINOGEN URINE: NORMAL
WBC # FLD AUTO: 4.89 K/UL

## 2022-09-09 ENCOUNTER — APPOINTMENT (OUTPATIENT)
Dept: DISASTER EMERGENCY | Facility: CLINIC | Age: 77
End: 2022-09-09

## 2022-09-12 LAB — B BURGDOR DNA SPEC QL NAA+PROBE: NEGATIVE

## 2022-09-14 ENCOUNTER — APPOINTMENT (OUTPATIENT)
Dept: INTERNAL MEDICINE | Facility: CLINIC | Age: 77
End: 2022-09-14

## 2022-09-14 PROCEDURE — 99441: CPT | Mod: CS,95

## 2022-09-14 RX ORDER — METHYLPREDNISOLONE 4 MG/1
4 TABLET ORAL
Qty: 1 | Refills: 0 | Status: DISCONTINUED | COMMUNITY
Start: 2022-05-05 | End: 2022-09-14

## 2022-09-16 ENCOUNTER — NON-APPOINTMENT (OUTPATIENT)
Age: 77
End: 2022-09-16

## 2022-09-20 ENCOUNTER — TRANSCRIPTION ENCOUNTER (OUTPATIENT)
Age: 77
End: 2022-09-20

## 2022-09-30 ENCOUNTER — APPOINTMENT (OUTPATIENT)
Dept: NEUROLOGY | Facility: CLINIC | Age: 77
End: 2022-09-30

## 2022-10-06 RX ORDER — ALBUTEROL SULFATE 90 UG/1
108 (90 BASE) INHALANT RESPIRATORY (INHALATION)
Qty: 1 | Refills: 0 | Status: ACTIVE | COMMUNITY
Start: 2022-10-06 | End: 1900-01-01

## 2022-10-31 NOTE — HISTORY OF PRESENT ILLNESS
[FreeTextEntry1] : This is a 72 y/o F with pmhx of bipolar d/o, CLL, hiatal hernia (prior fundoplication) with GERD & Meza's Esophagitis who presents to our office for a follow up appointment regarding repair of recurrent hiatal hernia.   \par \par She initially presented on 5/20/19 with GERD symptoms. She admits to epigastric pain, difficult and painful swallowing, choking on food at times. She denies weight loss, nausea, vomiting. She was told many years ago that her fundoplication had failed and she had a recurrent hiatal hernia.  At that time we recommended she undergo CT chest, endoscopy & barium esophagram.  She now presents to our office with this up to date imaging.\par \par 
5

## 2022-11-04 ENCOUNTER — APPOINTMENT (OUTPATIENT)
Dept: DISASTER EMERGENCY | Facility: CLINIC | Age: 77
End: 2022-11-04

## 2022-11-04 DIAGNOSIS — D64.9 ANEMIA, UNSPECIFIED: ICD-10-CM

## 2022-11-10 ENCOUNTER — APPOINTMENT (OUTPATIENT)
Dept: HEMATOLOGY ONCOLOGY | Facility: CLINIC | Age: 77
End: 2022-11-10

## 2022-11-10 ENCOUNTER — APPOINTMENT (OUTPATIENT)
Dept: HEART AND VASCULAR | Facility: CLINIC | Age: 77
End: 2022-11-10

## 2022-11-15 ENCOUNTER — APPOINTMENT (OUTPATIENT)
Dept: INTERNAL MEDICINE | Facility: CLINIC | Age: 77
End: 2022-11-15

## 2022-11-15 DIAGNOSIS — N64.4 MASTODYNIA: ICD-10-CM

## 2022-11-28 ENCOUNTER — RESULT REVIEW (OUTPATIENT)
Age: 77
End: 2022-11-28

## 2022-11-28 ENCOUNTER — APPOINTMENT (OUTPATIENT)
Dept: HEMATOLOGY ONCOLOGY | Facility: CLINIC | Age: 77
End: 2022-11-28

## 2022-11-28 ENCOUNTER — RX RENEWAL (OUTPATIENT)
Age: 77
End: 2022-11-28

## 2022-11-28 VITALS
BODY MASS INDEX: 29.06 KG/M2 | SYSTOLIC BLOOD PRESSURE: 143 MMHG | WEIGHT: 148 LBS | TEMPERATURE: 97.1 F | DIASTOLIC BLOOD PRESSURE: 80 MMHG | HEIGHT: 60 IN | HEART RATE: 92 BPM | RESPIRATION RATE: 18 BRPM | OXYGEN SATURATION: 92 %

## 2022-11-29 ENCOUNTER — APPOINTMENT (OUTPATIENT)
Dept: PAIN MANAGEMENT | Facility: CLINIC | Age: 77
End: 2022-11-29

## 2022-11-29 VITALS
SYSTOLIC BLOOD PRESSURE: 145 MMHG | DIASTOLIC BLOOD PRESSURE: 82 MMHG | WEIGHT: 148 LBS | HEIGHT: 60 IN | BODY MASS INDEX: 29.06 KG/M2 | TEMPERATURE: 97.6 F

## 2022-11-29 DIAGNOSIS — R20.0 ANESTHESIA OF SKIN: ICD-10-CM

## 2022-11-29 PROCEDURE — 99214 OFFICE O/P EST MOD 30 MIN: CPT

## 2022-11-29 NOTE — PHYSICAL EXAM
[de-identified] : Constitutional: Well-developed, in no acute distress\par \par Musculoskeletal:\par Lumbar Spine:   Gait: Antalgic			\par \par 		Sensation: normal and equal in bilateral lower extremities\par \par Extremity: mild edema noted, red marks on feet bilaterally\par Neurological: Memory normal, AAO x 3, Cranial nerves II - XII grossly normal\par Psychiatric: Appropriate mood and affect, oriented to time, place, person, and situation\par \par \par

## 2022-11-29 NOTE — ASSESSMENT
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 \par \par I have personally reviewed the patient's MRI in detail and discussed it with them which is significant for multiple levels of stenosis.\par \par The patient has failed to have relief with medication management. The patient has failed to have relief with more then six weeks of physical therapy within the last three months. Given the patients failure to improve with all other conservative measures, recommend L5-S1 interlaminar epidural steroid injection under fluoroscopic guidance. The patient will follow-up with me in my office two weeks following intervention.\par \par Physical therapy prescribed - goal will be to increase ROM, strengthening, postural training, other modalities ad letty which may include massage and stim. Goals of therapy discussed with the patient in detail and will be discussed with physical therapist. Patient will follow-up following course of physical therapy to monitor progress and adjust therapy as needed.\par \par Acetaminophen 1,000 mg q8h prn for moderate pain. Risks, benefits, and alternatives of acetaminophen discussed with patient.\par \par Diet and nutritional strategies discussed which may improve patients pain and will improve overall health.\par \par

## 2022-11-29 NOTE — DATA REVIEWED
[FreeTextEntry1] : 07/09/21\par MRI LUMBAR SPINE\par \par  Partially imaged left hip arthroplasty with associated hardware artifact.\par \par  When counting inferiorly from craniocervical junction on total spine localizer sequences, there\par appear to be 7 cervical type, 12 thoracic-type and 5 lumbar type vertebral bodies. For purposes of\par this report, vertebral body at level of the iliolumbar ligament will be designated as L5.\par Inferiormost well-formed intervertebral disc space will be designated as L5-S1. No MR evidence for\par transitional lumbosacral anatomy.\par \par \par  Lumbar lordosis is maintained. Moderate dextrocurvature of the lower thoracic and upper lumbar\par spine. There is mild L4-L5 anterolisthesis as well as minimal L2-L3 retrolisthesis, L1-L2\par retrolisthesis, T12-L1 retrolisthesis. Vertebral body heights are maintained. Vertebral body sacral\par bone marrow signal is markedly uniformly T1 hypointense, which may be related to underlying marrow\par infiltrative process in the setting of known lymphoma. There are multilevel degenerative endplate\par changes with osteophyte formation, intervertebral disc space narrowing/desiccation, Schmorl's nodes\par and endplate irregularity. No abnormal cord signal identified. Conus terminates at L1-L2. No\par significant periarticular or paraspinal soft tissue edema is identified. No suspicious expansile or\par destructive osseous lesion identified. Paraspinal soft tissues are unremarkable. There are partially\par imaged T2 hyperintense lesions within the kidneys, nonspecific incompletely characterized may\par reflect benign cystic lesions.\par \par \par  There are multilevel findings as follows:\par \par \par \par  T12-L1: Disc bulge, bilateral facet arthropathy, ligamentous hypertrophy contributing to\par significant canal stenosis and mild to moderate bilateral foraminal stenosis.\par \par  L1-L2: Disc bulge, bilateral facet arthropathy, ligamentous hypertrophy contributing to mild canal\par stenosis and mild to moderate bilateral foraminal stenosis.\par \par \par  L2-L3: Disc bulge, bilateral facet arthropathy, ligamentous hypertrophy contributing to moderate to\par severe canal stenosis and moderate to severe bilateral foraminal stenosis. There is moderate to\par severe bilateral lateral recess stenosis with involvement of descending L3 nerve roots.\par \par  L3-L4: Disc bulge, bilateral facet arthropathy, ligamentous hypertrophy contributing to mild to\par moderate canal stenosis and mild to moderate bilateral foraminal stenosis. There is mild to moderate\par bilateral lateral recess stenosis with involvement of descending L4 nerve roots.\par \par \par  L4-L5: Disc bulge with uncovering of the disc, bilateral facet arthropathy, ligamentous hypertrophy\par contributing to moderate to severe canal stenosis and moderate to severe bilateral foraminal\par stenosis. There is moderate to severe bilateral lateral recess stenosis with involvement of\par descending L5 nerve roots.\par \par \par  L5-S1: Disc bulge superimposed central disc protrusion, bilateral facet arthropathy and effusions,\par ligamentous hypertrophy contributing to no significant canal stenosis and mild to moderate bilateral\par foraminal stenosis.\par \par \par \par \par  IMPRESSION:\par \par  Multilevel lumbar spondylitic changes as detailed above notable for moderate to severe canal\par stenosis at L2-L3, L4-L5, moderate to severe bilateral foraminal stenosis at L2-L3, L4-L5 involving\par the exiting L2 and L4 nerve roots respectively as well as moderate to severe bilateral lateral\par recess stenosis at L2-L3, L4-L5 involving the descending L3 and L5 nerve roots, respectively.\par \par  Diffusely decreased T1-weighted signal of the visualized lumbosacral bone marrow which is\par nonspecific but may reflect underlying marrow infiltrative process in the setting of known lymphoma.\par \par \par \par

## 2022-11-29 NOTE — HISTORY OF PRESENT ILLNESS
[8] : 3. What number best describes how, during the past week, pain has interfered with your general activity? 8/10 pain [___ yrs] : [unfilled] year(s) ago [Constant] : constant [9] : a maximum pain level of 9/10 [Sharp] : sharp [Aching] : aching [Throbbing] : throbbing [Burning] : burning [Electric] : electric [Medications] : medications [FreeTextEntry1] : Interval HIstory:\par Patient returns for follow-up today. She continues to have back pain and severe pain down her legs. Left leg is worse then the right leg. She has significant numbness. She wishes to defer SCS therapy. No relief w/ medications. She wishes to consider an intervention for her back pain.\par \par HPI: 76 yof presents today for consultation with bilateral foot and leg pain. She has numbness and pain in her feet. She is endorsing intense muscle spasms and cramps.She has been losing balance and falling more frequently. She uses tramadol 50 mg prn. She had side effects w/ gabapentin and no relief w/ Lyrica. Her Lyme disease has resolved. Left leg is worse then the right. Left leg goes up to the upper leg. Pain stops at the right knee level. Also has pain in her left wrist. Dr. Leary manages her peripheral neuropathy. She has seen Dr. 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

## 2022-12-05 ENCOUNTER — APPOINTMENT (OUTPATIENT)
Dept: GASTROENTEROLOGY | Facility: CLINIC | Age: 77
End: 2022-12-05
Payer: MEDICARE

## 2022-12-05 DIAGNOSIS — C82.99 FOLLICULAR LYMPHOMA, UNSPECIFIED, EXTRANODAL AND SOLID ORGAN SITES: ICD-10-CM

## 2022-12-05 DIAGNOSIS — K22.10 ULCER OF ESOPHAGUS W/OUT BLEEDING: ICD-10-CM

## 2022-12-05 PROCEDURE — 99443: CPT | Mod: 95

## 2022-12-05 NOTE — ASSESSMENT
[FreeTextEntry1] : Fortunately the patient through the company that makes Dexilant has been able to get a supply of Dexilant which is kept her going for now.  This has kept her symptoms under very good control and there is no longer any consideration or need for us considering reoperation which would have been extraordinarily complicated\par \par Plan is to continue Dexilant and have the patient follow-up in 4 months time with a telehealth or telephonic visit

## 2022-12-12 ENCOUNTER — APPOINTMENT (OUTPATIENT)
Dept: PAIN MANAGEMENT | Facility: HOSPITAL | Age: 77
End: 2022-12-12

## 2023-01-30 NOTE — PHYSICAL EXAM
Chronic illness [de-identified] : Constitutional: Well-developed, in no acute distress\par \par Musculoskeletal:\par Lumbar Spine:   Gait: Antalgic			\par \par 		Sensation: normal and equal in bilateral lower extremities\par \par Extremity: mild edema noted, red marks on feet bilaterally\par Neurological: Memory normal, AAO x 3, Cranial nerves II - XII grossly normal\par Psychiatric: Appropriate mood and affect, oriented to time, place, person, and situation\par \par \par

## 2023-02-17 ENCOUNTER — NON-APPOINTMENT (OUTPATIENT)
Age: 78
End: 2023-02-17

## 2023-03-02 ENCOUNTER — APPOINTMENT (OUTPATIENT)
Dept: PAIN MANAGEMENT | Facility: CLINIC | Age: 78
End: 2023-03-02
Payer: MEDICARE

## 2023-03-02 ENCOUNTER — APPOINTMENT (OUTPATIENT)
Dept: HEART AND VASCULAR | Facility: CLINIC | Age: 78
End: 2023-03-02
Payer: MEDICARE

## 2023-03-02 ENCOUNTER — NON-APPOINTMENT (OUTPATIENT)
Age: 78
End: 2023-03-02

## 2023-03-02 VITALS
BODY MASS INDEX: 27.68 KG/M2 | OXYGEN SATURATION: 96 % | WEIGHT: 141 LBS | SYSTOLIC BLOOD PRESSURE: 130 MMHG | HEART RATE: 85 BPM | DIASTOLIC BLOOD PRESSURE: 90 MMHG | HEIGHT: 60 IN

## 2023-03-02 VITALS
WEIGHT: 141 LBS | BODY MASS INDEX: 27.68 KG/M2 | DIASTOLIC BLOOD PRESSURE: 81 MMHG | HEIGHT: 60 IN | TEMPERATURE: 98 F | SYSTOLIC BLOOD PRESSURE: 149 MMHG

## 2023-03-02 PROCEDURE — 99213 OFFICE O/P EST LOW 20 MIN: CPT

## 2023-03-02 PROCEDURE — 93000 ELECTROCARDIOGRAM COMPLETE: CPT

## 2023-03-02 PROCEDURE — 99214 OFFICE O/P EST MOD 30 MIN: CPT

## 2023-03-02 NOTE — PHYSICAL EXAM
[de-identified] : Constitutional: Well-developed, in no acute distress\par \par Musculoskeletal:\par Lumbar Spine:   Gait: Antalgic			\par \par 		Sensation: normal and equal in bilateral lower extremities\par \par Extremity: mild edema noted, red marks on feet bilaterally\par Neurological: Memory normal, AAO x 3, Cranial nerves II - XII grossly normal\par Psychiatric: Appropriate mood and affect, oriented to time, place, person, and situation\par \par \par

## 2023-03-02 NOTE — HISTORY OF PRESENT ILLNESS
[FreeTextEntry1] : 77 F female with CLL in remission dx 2012, treated up until Feb 2022.  Originally seen by cardiology for atypical chest pain.  Had extensive testing, noted to have minimal CAD on CCTA, unremarkable ECHO.  \par \par Walks all day, does her housekeeping, cooking, goes on elliptical for ~ 15 mins usually twice a day. No chest pain. No leg swelling.  No orthopnea.  No palps.   Has chronic low back pain issues, peripheral neuropathy which is her biggest limiting factor when exerting her self, walking stairs.  \par \par 3/2/23:  no cp/sob/palps.  no dizziness/syncope.  no leg swelling.

## 2023-03-02 NOTE — ASSESSMENT
[FreeTextEntry1] : 77F \par \par Mild AI and MR\par Minimal CAD\par CLL -\par HLD - \par \par EKG NSR, rate 76\par Chest CT (jan 2022) - Major thoracic vessels are normal in appearance. Heart is normal in size. No pericardial effusion. \par TST (jan 2021) - Mild mid sternal chest pain at peak exercise. Normal ST segment response to stress.\par Holter (jan 2021) - Predominantly NSR with rare PACs and PVCs\par Echo (oct 2020) - EF 62% - Normal LV size, function and wall thickness. Mildly increased LA size. Mild AV and MV regurgitation. \par Cardiac CTA (oct 2020) - Ca+ score = 17. Minimal stenosis in prox LAD and D1. Minimal calcification of the AV. \par Nuclear stress test (sept 2020) - mildly positive stress test apical ischemia.\par \par - no active cardiac issues\par - BP controlled\par - LDL trending down since starting lipitor 10mg,  Repeat lipids at next PCP visit in April.   Continue current dose.  \par

## 2023-03-02 NOTE — ASSESSMENT
[FreeTextEntry1] : 77 yof presents today for follow-up with severe back pain and peripheral neuropathy.\par \par I have personally reviewed and discussed the EMG with the patient which is significant for peripheral neuropathy, primarily axonal in nature, moderate to severe in degree. \par \par I have personally reviewed the patient's MRI in detail and discussed it with them which is significant for multiple levels of stenosis.\par \par The patient has failed to have relief with medication management. The patient has failed to have relief with more then six weeks of physical therapy within the last three months. Given the patients failure to improve with all other conservative measures, recommend L5-S1 interlaminar epidural steroid injection under fluoroscopic guidance. The patient will follow-up with me in my office two weeks following intervention.\par \par I have discussed in detail with the patient that an interventional spine procedure is associated with potential risks. The procedure may include an injection of steroid and potentially other medications (local anesthetic and normal saline) into the epidural space or surrounding tissue of the spine. There are significant risks of this procedure which include and are not limited to infection, bleeding, worsening pain, dural puncture leading to post-dural puncture headache, nerve damage, spinal cord injury, paralysis, stroke, and death. There is a chance that the procedure does not improve their pain. There are risks associated with the steroid being absorbed into the body systemically. These include dysphoria, difficulty sleeping, mood swings, and personality changes. Pre-menopausal women may notice a regularity his in her menstrual cycle for 2-3 months following the injection. Steroids can specifically affect patients with hypertension, diabetes, and peptic ulcers. The procedure may cause a temporary increase in blood pressure and blood glucose, and may adversely affect a peptic ulcer. Other, more rare complications, including avascular necrosis of the joints, glaucoma, and osteoporosis. I have discussed the risks of the procedure at length with the patient, and the potential benefits of pain relief. I have offered alternatives to the procedure. All questions were answered. The patient expressed understanding and wishes to proceed with the procedure.\par \par \par Physical therapy prescribed - goal will be to increase ROM, strengthening, postural training, other modalities ad letty which may include massage and stim. Goals of therapy discussed with the patient in detail and will be discussed with physical therapist. Patient will follow-up following course of physical therapy to monitor progress and adjust therapy as needed.\par \par Acetaminophen 1,000 mg q8h prn for moderate pain. Risks, benefits, and alternatives of acetaminophen discussed with patient.\par \par Diet and nutritional strategies discussed which may improve patients pain and will improve overall health.\par \par Consider cervical JAQUI if pain persists.\par \par

## 2023-03-06 NOTE — HISTORY OF PRESENT ILLNESS
[FreeTextEntry8] : new patient visit. \par \par Patient comes for several reasons.\par 1. She has a viral could that has been going on for a few days. She has clear cough no fever body aches or chills. \par 2. Patient would like to establish with a new pcp. \par 3. patient has been diagnosed with bipolar and she has not been going to a psychiatrist. Her old Pcp was prescribing ativan 4mg four times a day. Patient is aware that this is not proper management.  Niacinamide Pregnancy And Lactation Text: These medications are considered safe during pregnancy.

## 2023-03-07 ENCOUNTER — APPOINTMENT (OUTPATIENT)
Dept: PAIN MANAGEMENT | Facility: HOSPITAL | Age: 78
End: 2023-03-07

## 2023-03-09 ENCOUNTER — TRANSCRIPTION ENCOUNTER (OUTPATIENT)
Age: 78
End: 2023-03-09

## 2023-03-22 ENCOUNTER — APPOINTMENT (OUTPATIENT)
Dept: NEUROLOGY | Facility: CLINIC | Age: 78
End: 2023-03-22

## 2023-03-22 ENCOUNTER — APPOINTMENT (OUTPATIENT)
Dept: NEUROLOGY | Facility: CLINIC | Age: 78
End: 2023-03-22
Payer: MEDICARE

## 2023-03-22 DIAGNOSIS — M62.838 OTHER MUSCLE SPASM: ICD-10-CM

## 2023-03-22 PROCEDURE — 99214 OFFICE O/P EST MOD 30 MIN: CPT | Mod: 95

## 2023-03-22 PROCEDURE — ZZZZZ: CPT

## 2023-03-22 RX ORDER — ATORVASTATIN CALCIUM 10 MG/1
10 TABLET, FILM COATED ORAL
Refills: 0 | Status: COMPLETED | COMMUNITY
Start: 2022-09-14 | End: 2023-03-22

## 2023-03-22 RX ORDER — HYDROXYZINE HYDROCHLORIDE 25 MG/1
25 TABLET ORAL
Qty: 60 | Refills: 0 | Status: COMPLETED | COMMUNITY
Start: 2022-06-24 | End: 2023-03-22

## 2023-03-22 NOTE — ASSESSMENT
[FreeTextEntry1] : She will continue nonpharmacologic measures such as massaging using Epsom salt baths and exercise.  She is trying her very best from this perspective.  \par \par Increase Tramadol 50 mg 4 times a day.  \par \par She has to use a cane for longer distances.  \par \par Muscle spasm\par Tizanidine 2 mg twice daily as needed.\par \par Significant quality of life issues due to pain - \par \par Consider Lamotrigine or Cymbalta\par Would need to discuss with Dr. Epifanio Saba her psychiatrist ([phone])\par

## 2023-03-22 NOTE — HISTORY OF PRESENT ILLNESS
[Home] : at home, [unfilled] , at the time of the visit. [Medical Office: ([facility])___] : at the medical office located in  [Verbal consent obtained from patient] : the patient, [unfilled] [FreeTextEntry1] : Increased neuropathic symptoms\par \par back does not hurt after epidural \par \par both legs numb\par right arm numb\par this went away after epidural for 3 days \par has ganglion cyst \par \par Feels neuropathy worse by 50% \par increased pain \par feet feel like she is walking on a block\par Notes significant -- pins, needles\par \par Increased muscle spasms every night\par Can take a long time to get lose - \par \par Had stopped taking Tramadol a few months ago.\par Then contacted us again last week bc worse symptoms.\par \par Tramadol wasn't filled by insurance - only given 7 tabs which she refused.\par \par Recalls taking oxycodone without Tylenol - it did work - \par Notes severe pain. \par \par magnesium, zinc\par turmeric drink\par \par 5/5/22\par Here in f/u. \par \par Still has burning pain.\par Numbness in both legs\par no weakness in legs.\par Walks daily and does elliptical.\par \par Tolerating Tramadol but pain controlled only for 3 hours\par \par 2/1/22\par Finished with chemo for now.\par \par Continuing to have severe burning pain in her feet.\par Numb in entire left leg up to groin.\par Notes symptoms worsen with activity.\par Zapping pain and severe leg jolts.\par \par Chronic low back pain.\par Pain MD recommend spinal cord stimulator.\par \par She tolerates tramadol three times a day but only gets relief for 3 hours at a time.\par She could not tolerate Lamictal or Lyrica.  She did not do well on gabapentin.  Because of her other psychiatric diagnoses, we have to be careful about introduction of SSRIs or tricyclic antidepressants.\par \par \par 7/12/21\par Tana continues to have severe burning pain in her feet.  The pain is worse at night.  She is beginning chemotherapy on the 23rd for CLL.\par She also notes a lot of numbness in her left hand.  Finger brace made it worse.  There is a left ganglion cyst.  She has seen her hand surgeon.\par She has seen pain management and they recommend spinal cord stimulator.\par \par The pain is worse at night.  At present she is declining any intervention as her chemotherapy takes precedence over other interventions.\par \par She tolerates tramadol twice a day but only gets relief for 3 hours at a time.\par She could not tolerate Lamictal or Lyrica.  She did not do well on gabapentin.  Because of her other psychiatric diagnoses, we have to be careful about introduction of SSRIs or tricyclic antidepressants.\par \par 1/5/2021\par Patient being seen for follow up. She continues to have significant neuropathic pain. Discontinued Lyrica because she didn't think it was working. It did work initially. Increasing the dose didn't help her. She is trying to eat well and exercise regularly. She is walking. She describes numbness in her right leg up to her ankle. The left leg is numb up to the thigh. She had to massage it often. The tramadol with Tylenol has been beneficial and provides her 3 hours of relief at a time.\par With exercise balance has improved.\par \par She has been hesitant to add other medications but is willing to try Lamictal at this time.\par 9/2/2020\par The patient is here in followup. She is accompanied to this visit by her daughter. She continues to have significant numbness and tingling in her feet. She also has significant burning. Putting pressure on her feet makes it much worse especially when she walks.\par Patient has been isolating in her home stating her. Apparently she did have 4-5 tick bites. She started noticing excessive swelling involving her feet. Because of these and other symptoms which included a left facial droop she went to see her primary care physician. Dr. Elizondo removed a tick from her back. She was Lyme positive. She had developed some pleurisy. She was treated with doxycycline and Advil. Once her infection was treated, she noted swelling in her feet improved considerably. \par \par When I had first started on Lyrica, she noted tremendous improvement in her pain. She had been holding off the Lyrica because she thought that was causing her neuropathic symptoms and swollen feet.\par \par Gabapentin caused her headaches.\par \par Following closes with psychiatrist- currently taking Seroquel and Klonopin. Bipolar disease under good control.\par 2/4/2020\par Here in follow up. We discussed EMG results. She does have moderate peripheral neuropathy. Her strength is actually intact. She began Lyrica last week. Since beginning at, she notices at least a 50% improvement in her pain. She is able to walk better. Even the numbness is somewhat improved.\par \par 12/20/19\par This is a pleasant 74-year-old right-handed woman who is being seen in neurologic consultation for evaluation of numbness and pain in her feet. Patient states that the numbness and tingling has been present for greater than a year but of late has been getting very intense. She describes constantly having cold feet. There is intense tingling. There is a lot of pins and needles and pain in her feet. It is present day and night. It tends to bother her more at night because she cannot sleep. She is endorsing intense muscle spasms and cramps.She has been losing balance and falling more frequently. Patient also states that she has noticed her left eyelid drooping. She also has a facial droop and thinks it has been present for greater than a year. She does not recall any episode of Bell's palsy.

## 2023-03-22 NOTE — DATA REVIEWED
[de-identified] : \par  [facility] MRI Report             Final\par \par No Documents Attached\par \par \par \par \par   [facility]\par                                          [address]\par                                    [place], [place]  [address]\par                                        Department of Radiology\par                                             [phone]\par \par \par Patient Name:      JARAD RAMIREZ                Location:       [facility]\par Med Rec #:        [id number]                    Account #:      [id number]\par YOB: 1945                    Ordering:       Angel Leary MD\par Age: 74               Sex:    F                 Attending:      Angel Leary MD\par PCP:        NO PRIMARY CARE PHYSICIAN\par ______________________________________________________________________________________\par \par Exam Date:      12/24/19\par Exam:         MRI BRAIN WAW IC\par Order#:       MRI [id number]\par \par \par \par INDICATION: Left facial droop and left eyelid ptosis. Frequent falls.\par \par  TECHNIQUE: MRI of the brain was performed without and with contrast. 7 mL of Gadavist was\par administered intravenously. 0 mL was discarded.\par \par  COMPARISON: None.\par \par  FINDINGS:\par  There is no evidence of acute infarct, acute intracranial hemorrhage, mass effect or hydrocephalus.\par No extra-axial collection. Basal cisterns are patent.\par \par  No brain parenchymal signal abnormality.\par \par  There is no abnormal intracranial enhancement.\par \par  Ventricles and sulci are age-appropriate in size.\par \par  Major intracranial flow-voids are preserved.\par \par  Bilateral cataract surgery. The orbits, sellar and suprasellar structures, and craniocervical\par junction are otherwise unremarkable. Mucosal thickening involving the left maxillary sinus. Visu\par alized paranasal sinuses and mastoid air cells are otherwise clear.\par \par \par  IMPRESSION:\par  Unremarkable study.\par \par ***Electronically Signed ***\par -----------------------------------------------\par Ananth Masters MD              12/26/19 1415\par \par Dictated on 12/24/19 1455\par \par \par Report cc:  Angel Leary MD;\par \par  \par \par  Ordered by: ANGEL LEARY       Collected/Examined: 24Dec2019 01:18PM       \par Verified by: ANGEL LEARY 26Dec2019 02:37PM       \par  Result Communication: No patient communication needed at this time;\par Stage: Final       \par  Performed at: [facility]       Resulted: 24Dec2019 02:55PM       Last Updated: 26Dec2019 02:37PM       Accession: [id number]

## 2023-03-22 NOTE — CONSULT LETTER
[Dear  ___] : Dear  [unfilled], [Courtesy Letter:] : I had the pleasure of seeing your patient, [unfilled], in my office today. [Please see my note below.] : Please see my note below. [FreeTextEntry3] : Sincerely,\par \par Jermaine Leary M.D.\par  1 Principal Discharge DX:	Hyperglycemia

## 2023-04-03 ENCOUNTER — APPOINTMENT (OUTPATIENT)
Dept: PAIN MANAGEMENT | Facility: CLINIC | Age: 78
End: 2023-04-03

## 2023-04-03 ENCOUNTER — APPOINTMENT (OUTPATIENT)
Dept: HEMATOLOGY ONCOLOGY | Facility: CLINIC | Age: 78
End: 2023-04-03

## 2023-04-08 ENCOUNTER — FORM ENCOUNTER (OUTPATIENT)
Age: 78
End: 2023-04-08

## 2023-04-14 ENCOUNTER — NON-APPOINTMENT (OUTPATIENT)
Age: 78
End: 2023-04-14

## 2023-04-18 ENCOUNTER — APPOINTMENT (OUTPATIENT)
Dept: INTERNAL MEDICINE | Facility: CLINIC | Age: 78
End: 2023-04-18

## 2023-05-24 ENCOUNTER — APPOINTMENT (OUTPATIENT)
Dept: NEUROLOGY | Facility: CLINIC | Age: 78
End: 2023-05-24

## 2023-06-01 ENCOUNTER — APPOINTMENT (OUTPATIENT)
Dept: FAMILY MEDICINE | Facility: CLINIC | Age: 78
End: 2023-06-01
Payer: MEDICARE

## 2023-06-01 VITALS
DIASTOLIC BLOOD PRESSURE: 70 MMHG | HEART RATE: 95 BPM | TEMPERATURE: 97 F | RESPIRATION RATE: 18 BRPM | SYSTOLIC BLOOD PRESSURE: 126 MMHG | OXYGEN SATURATION: 98 % | HEIGHT: 60 IN | BODY MASS INDEX: 28.27 KG/M2 | WEIGHT: 144 LBS

## 2023-06-01 DIAGNOSIS — R30.0 DYSURIA: ICD-10-CM

## 2023-06-01 DIAGNOSIS — Z12.31 ENCOUNTER FOR SCREENING MAMMOGRAM FOR MALIGNANT NEOPLASM OF BREAST: ICD-10-CM
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
## 2023-06-01 PROCEDURE — 81003 URINALYSIS AUTO W/O SCOPE: CPT | Mod: QW

## 2023-06-01 PROCEDURE — 99214 OFFICE O/P EST MOD 30 MIN: CPT | Mod: 25

## 2023-06-02 ENCOUNTER — RESULT CHARGE (OUTPATIENT)
Age: 78
End: 2023-06-02

## 2023-06-02 LAB
APPEARANCE: CLEAR
BACTERIA: NEGATIVE /HPF
BILIRUB UR QL STRIP: NEGATIVE
BILIRUBIN URINE: NEGATIVE
BLOOD URINE: NEGATIVE
CALCIUM OXALATE CRYSTALS: PRESENT
CAST: 1 /LPF
CLARITY UR: CLEAR
COLLECTION METHOD: NORMAL
COLOR: NORMAL
EPITHELIAL CELLS: 4 /HPF
GLUCOSE QUALITATIVE U: NEGATIVE MG/DL
GLUCOSE UR-MCNC: NEGATIVE
HCG UR QL: 0.2 EU/DL
HGB UR QL STRIP.AUTO: NORMAL
KETONES UR-MCNC: NORMAL
KETONES URINE: ABNORMAL MG/DL
LEUKOCYTE ESTERASE UR QL STRIP: NORMAL
LEUKOCYTE ESTERASE URINE: ABNORMAL
MICROSCOPIC-UA: NORMAL
NITRITE UR QL STRIP: NEGATIVE
NITRITE URINE: NEGATIVE
PH UR STRIP: 6.5
PH URINE: 6.5
PROT UR STRIP-MCNC: NORMAL
PROTEIN URINE: 30 MG/DL
RED BLOOD CELLS URINE: NORMAL /HPF
REVIEW: NORMAL
SP GR UR STRIP: 1.02
SPECIFIC GRAVITY URINE: 1.02
UROBILINOGEN URINE: 0.2 MG/DL
WHITE BLOOD CELLS URINE: 1 /HPF

## 2023-06-03 LAB — BACTERIA UR CULT: NORMAL

## 2023-06-06 ENCOUNTER — NON-APPOINTMENT (OUTPATIENT)
Age: 78
End: 2023-06-06
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
## 2023-06-07 PROBLEM — Z12.31 BREAST CANCER SCREENING BY MAMMOGRAM: Status: ACTIVE | Noted: 2023-06-01

## 2023-06-07 PROBLEM — R30.0 DYSURIA: Status: ACTIVE | Noted: 2022-09-01

## 2023-06-07 NOTE — PHYSICAL EXAM
[No Acute Distress] : no acute distress [Well Nourished] : well nourished [Well Developed] : well developed [Well-Appearing] : well-appearing [Normal Voice/Communication] : normal voice/communication [Normal Sclera/Conjunctiva] : normal sclera/conjunctiva [PERRL] : pupils equal round and reactive to light [EOMI] : extraocular movements intact [Normal Outer Ear/Nose] : the outer ears and nose were normal in appearance [Normal Oropharynx] : the oropharynx was normal [No JVD] : no jugular venous distention [No Lymphadenopathy] : no lymphadenopathy [Supple] : supple [Thyroid Normal, No Nodules] : the thyroid was normal and there were no nodules present [No Respiratory Distress] : no respiratory distress  [No Accessory Muscle Use] : no accessory muscle use [Clear to Auscultation] : lungs were clear to auscultation bilaterally [Normal Percussion] : the chest was normal to percussion [Normal Rate] : normal rate  [Regular Rhythm] : with a regular rhythm [Normal S1, S2] : normal S1 and S2 [No Murmur] : no murmur heard [No Carotid Bruits] : no carotid bruits [Pedal Pulses Present] : the pedal pulses are present [No Edema] : there was no peripheral edema [Soft] : abdomen soft [Non Tender] : non-tender [No HSM] : no HSM [Normal Axillary Nodes] : no axillary lymphadenopathy [Normal Posterior Cervical Nodes] : no posterior cervical lymphadenopathy [Normal Anterior Cervical Nodes] : no anterior cervical lymphadenopathy [Normal Inguinal Nodes] : no inguinal lymphadenopathy [No CVA Tenderness] : no CVA  tenderness [No Spinal Tenderness] : no spinal tenderness [No Rash] : no rash [Coordination Grossly Intact] : coordination grossly intact [de-identified] : 1+ lateral chest wall compressive tenderness, no crepitus [de-identified] : Using cane for stability

## 2023-06-07 NOTE — HEALTH RISK ASSESSMENT
[0] : 2) Feeling down, depressed, or hopeless: Not at all (0) [PHQ-2 Negative - No further assessment needed] : PHQ-2 Negative - No further assessment needed [Former] : Former [0-4] : 0-4 [RNL3Poaid] : 0 [de-identified] :  1/4 pk/wk x8 yrs [id number]

## 2023-06-07 NOTE — HISTORY OF PRESENT ILLNESS
[Family Member] : family member [FreeTextEntry1] : Hospital/rehabilitation follow-up [de-identified] : 77-year-old female sustained a fall on 04/07/2023 at home with resultant fracture of 6 right ribs. Admitted to [facility] on 04/08/2023 and discharged on 04/12/2023. Evidently, hospitalization was uneventful and primarily done for pain management and placement. Transferred to [facility] on 04/12/2023 and was discharged home on 05/11/2023. While there received aggressive therapy to restore function and improved balance. Patient reports no change in medications during inpatient treatment. Since discharge, reports feeling reasonably well without any cough, shortness of breath or pleuritic chest pain. Tolerating all prescription medications. Concerned about "urinary tract infection" due to mild dysuria and frequency of urination. However, denies any gross hematuria, turbid or foul-smelling urine. Requesting requisition for annual screening mammography.

## 2023-06-07 NOTE — PLAN
[FreeTextEntry1] : 1)-Treatment plan as outlined above.\par 2)-No other prescription refills needed at this time.

## 2023-06-07 NOTE — REVIEW OF SYSTEMS
[Dysuria] : dysuria [Frequency] : frequency [Joint Pain] : joint pain [Muscle Pain] : muscle pain [Back Pain] : back pain [Negative] : Heme/Lymph [Fever] : no fever [Chills] : no chills [Fatigue] : no fatigue [Recent Change In Weight] : ~T no recent weight change [Chest Pain] : no chest pain [Palpitations] : no palpitations [Leg Claudication] : no leg claudication [Lower Ext Edema] : no lower extremity edema [Orthopnea] : no orthopnea [Paroxysmal Nocturnal Dyspnea] : no paroxysmal nocturnal dyspnea [Shortness Of Breath] : no shortness of breath [Wheezing] : no wheezing [Cough] : no cough [Dyspnea on Exertion] : no dyspnea on exertion [Abdominal Pain] : no abdominal pain [Incontinence] : no incontinence [Nocturia] : no nocturia [Hematuria] : no hematuria [Vaginal Discharge] : no vaginal discharge [Skin Rash] : no skin rash [Swollen Glands] : no swollen glands

## 2023-06-07 NOTE — HISTORY OF PRESENT ILLNESS
[Family Member] : family member [FreeTextEntry1] : Hospital/rehabilitation follow-up [de-identified] : 77-year-old female sustained a fall on 04/07/2023 at home with resultant fracture of 6 right ribs. Admitted to [facility] on 04/08/2023 and discharged on 04/12/2023. Evidently, hospitalization was uneventful and primarily done for pain management and placement. Transferred to [facility] on 04/12/2023 and was discharged home on 05/11/2023. While there received aggressive therapy to restore function and improved balance. Patient reports no change in medications during inpatient treatment. Since discharge, reports feeling reasonably well without any cough, shortness of breath or pleuritic chest pain. Tolerating all prescription medications. Concerned about "urinary tract infection" due to mild dysuria and frequency of urination. However, denies any gross hematuria, turbid or foul-smelling urine. Requesting requisition for annual screening mammography.

## 2023-06-07 NOTE — PHYSICAL EXAM
[No Acute Distress] : no acute distress [Well Nourished] : well nourished [Well Developed] : well developed [Well-Appearing] : well-appearing [Normal Voice/Communication] : normal voice/communication [Normal Sclera/Conjunctiva] : normal sclera/conjunctiva [PERRL] : pupils equal round and reactive to light [EOMI] : extraocular movements intact [Normal Outer Ear/Nose] : the outer ears and nose were normal in appearance [Normal Oropharynx] : the oropharynx was normal [No JVD] : no jugular venous distention [No Lymphadenopathy] : no lymphadenopathy [Supple] : supple [Thyroid Normal, No Nodules] : the thyroid was normal and there were no nodules present [No Respiratory Distress] : no respiratory distress  [No Accessory Muscle Use] : no accessory muscle use [Clear to Auscultation] : lungs were clear to auscultation bilaterally [Normal Percussion] : the chest was normal to percussion [Normal Rate] : normal rate  [Regular Rhythm] : with a regular rhythm [Normal S1, S2] : normal S1 and S2 [No Murmur] : no murmur heard [No Carotid Bruits] : no carotid bruits [Pedal Pulses Present] : the pedal pulses are present [No Edema] : there was no peripheral edema [Soft] : abdomen soft [Non Tender] : non-tender [No HSM] : no HSM [Normal Axillary Nodes] : no axillary lymphadenopathy [Normal Posterior Cervical Nodes] : no posterior cervical lymphadenopathy [Normal Anterior Cervical Nodes] : no anterior cervical lymphadenopathy [Normal Inguinal Nodes] : no inguinal lymphadenopathy [No CVA Tenderness] : no CVA  tenderness [No Spinal Tenderness] : no spinal tenderness [No Rash] : no rash [Coordination Grossly Intact] : coordination grossly intact [de-identified] : 1+ lateral chest wall compressive tenderness, no crepitus [de-identified] : Using cane for stability

## 2023-06-07 NOTE — HEALTH RISK ASSESSMENT
[0] : 2) Feeling down, depressed, or hopeless: Not at all (0) [PHQ-2 Negative - No further assessment needed] : PHQ-2 Negative - No further assessment needed [Former] : Former [0-4] : 0-4 [[id number]] : 0 [de-identified] :  1/4 pk/wk x8 yrs [id number]

## 2023-06-14 ENCOUNTER — RESULT REVIEW (OUTPATIENT)
Age: 78
End: 2023-06-14

## 2023-06-14 DIAGNOSIS — M81.0 AGE-RELATED OSTEOPOROSIS W/OUT CURRENT PATHOLOGICAL FRACTURE: ICD-10-CM

## 2023-06-14 DIAGNOSIS — M48.02 SPINAL STENOSIS, CERVICAL REGION: ICD-10-CM

## 2023-06-15 ENCOUNTER — APPOINTMENT (OUTPATIENT)
Dept: PAIN MANAGEMENT | Facility: CLINIC | Age: 78
End: 2023-06-15
Payer: MEDICARE

## 2023-06-15 VITALS
DIASTOLIC BLOOD PRESSURE: 67 MMHG | HEIGHT: 60 IN | WEIGHT: 144 LBS | SYSTOLIC BLOOD PRESSURE: 103 MMHG | BODY MASS INDEX: 28.27 KG/M2

## 2023-06-15 DIAGNOSIS — M54.16 RADICULOPATHY, LUMBAR REGION: ICD-10-CM

## 2023-06-15 PROBLEM — N64.4 BREAST PAIN, RIGHT: Status: ACTIVE | Noted: 2023-06-15

## 2023-06-15 PROCEDURE — 99213 OFFICE O/P EST LOW 20 MIN: CPT

## 2023-06-15 PROCEDURE — 99214 OFFICE O/P EST MOD 30 MIN: CPT

## 2023-06-15 NOTE — DATA REVIEWED
[FreeTextEntry1] : CT (04/08/23):\par \par Acute right 6, 7, 8, 9, 10, and 11th rib fractures. Small right-sided pleural effusion.\par \par 07/09/21\par MRI LUMBAR SPINE\par \par  Partially imaged left hip arthroplasty with associated hardware artifact.\par \par  When counting inferiorly from craniocervical junction on total spine localizer sequences, there\par appear to be 7 cervical type, 12 thoracic-type and 5 lumbar type vertebral bodies. For purposes of\par this report, vertebral body at level of the iliolumbar ligament will be designated as L5.\par Inferiormost well-formed intervertebral disc space will be designated as L5-S1. No MR evidence for\par transitional lumbosacral anatomy.\par \par \par  Lumbar lordosis is maintained. Moderate dextrocurvature of the lower thoracic and upper lumbar\par spine. There is mild L4-L5 anterolisthesis as well as minimal L2-L3 retrolisthesis, L1-L2\par retrolisthesis, T12-L1 retrolisthesis. Vertebral body heights are maintained. Vertebral body sacral\par bone marrow signal is markedly uniformly T1 hypointense, which may be related to underlying marrow\par infiltrative process in the setting of known lymphoma. There are multilevel degenerative endplate\par changes with osteophyte formation, intervertebral disc space narrowing/desiccation, Schmorl's nodes\par and endplate irregularity. No abnormal cord signal identified. Conus terminates at L1-L2. No\par significant periarticular or paraspinal soft tissue edema is identified. No suspicious expansile or\par destructive osseous lesion identified. Paraspinal soft tissues are unremarkable. There are partially\par imaged T2 hyperintense lesions within the kidneys, nonspecific incompletely characterized may\par reflect benign cystic lesions.\par \par \par  There are multilevel findings as follows:\par \par \par \par  T12-L1: Disc bulge, bilateral facet arthropathy, ligamentous hypertrophy contributing to\par significant canal stenosis and mild to moderate bilateral foraminal stenosis.\par \par  L1-L2: Disc bulge, bilateral facet arthropathy, ligamentous hypertrophy contributing to mild canal\par stenosis and mild to moderate bilateral foraminal stenosis.\par \par \par  L2-L3: Disc bulge, bilateral facet arthropathy, ligamentous hypertrophy contributing to moderate to\par severe canal stenosis and moderate to severe bilateral foraminal stenosis. There is moderate to\par severe bilateral lateral recess stenosis with involvement of descending L3 nerve roots.\par \par  L3-L4: Disc bulge, bilateral facet arthropathy, ligamentous hypertrophy contributing to mild to\par moderate canal stenosis and mild to moderate bilateral foraminal stenosis. There is mild to moderate\par bilateral lateral recess stenosis with involvement of descending L4 nerve roots.\par \par \par  L4-L5: Disc bulge with uncovering of the disc, bilateral facet arthropathy, ligamentous hypertrophy\par contributing to moderate to severe canal stenosis and moderate to severe bilateral foraminal\par stenosis. There is moderate to severe bilateral lateral recess stenosis with involvement of\par descending L5 nerve roots.\par \par \par  L5-S1: Disc bulge superimposed central disc protrusion, bilateral facet arthropathy and effusions,\par ligamentous hypertrophy contributing to no significant canal stenosis and mild to moderate bilateral\par foraminal stenosis.\par \par \par \par \par  IMPRESSION:\par \par  Multilevel lumbar spondylitic changes as detailed above notable for moderate to severe canal\par stenosis at L2-L3, L4-L5, moderate to severe bilateral foraminal stenosis at L2-L3, L4-L5 involving\par the exiting L2 and L4 nerve roots respectively as well as moderate to severe bilateral lateral\par recess stenosis at L2-L3, L4-L5 involving the descending L3 and L5 nerve roots, respectively.\par \par  Diffusely decreased T1-weighted signal of the visualized lumbosacral bone marrow which is\par nonspecific but may reflect underlying marrow infiltrative process in the setting of known lymphoma.\par \par \par \par

## 2023-06-15 NOTE — PHYSICAL EXAM
[de-identified] : Constitutional: Well-developed, in no acute distress\par \par Musculoskeletal:\par Lumbar Spine:   Gait: Antalgic			\par \par 		Sensation: normal and equal in bilateral lower extremities\par \par Extremity: mild edema noted, red marks on feet bilaterally\par Neurological: Memory normal, AAO x 3, Cranial nerves II - XII grossly normal\par Psychiatric: Appropriate mood and affect, oriented to time, place, person, and situation\par \par \par

## 2023-06-15 NOTE — HISTORY OF PRESENT ILLNESS
[8] : 3. What number best describes how, during the past week, pain has interfered with your general activity? 8/10 pain [___ yrs] : [unfilled] year(s) ago [Constant] : constant [9] : a maximum pain level of 9/10 [Sharp] : sharp [Aching] : aching [Throbbing] : throbbing [Burning] : burning [Electric] : electric [Medications] : medications [FreeTextEntry1] : Interval HIstory:\par Patient returns for follow-up today. Since her last visit she fell and broke her ribs on the right side on April 7, 2023. Her pain is severe and not improving. She continues to have back pain and severe pain down her legs. Left leg is worse then the right leg. She has significant numbness. She wishes to defer SCS therapy. No relief w/ medications. Quality of life is impaired. There has been a severe exacerbation of the patient's chronic pain. She did have significant relief from her previous JAQUI. \par \par HPI: 76 yof presents today for consultation with bilateral foot and leg pain. She has numbness and pain in her feet. She is endorsing intense muscle spasms and cramps.She has been losing balance and falling more frequently. She uses tramadol 50 mg prn. She had side effects w/ gabapentin and no relief w/ Lyrica. Her Lyme disease has resolved. Left leg is worse then the right. Left leg goes up to the upper leg. Pain stops at the right knee level. Also has pain in her left wrist. Dr. Leary manages her peripheral neuropathy. She has seen Dr. 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

## 2023-06-15 NOTE — ASSESSMENT
[FreeTextEntry1] : 77 yof presents today for follow-up with severe peripheral neuropathy and s/p fall with recent rib fractures.\par \par I have personally reviewed and discussed the EMG with the patient which is significant for peripheral neuropathy, primarily axonal in nature, moderate to severe in degree. \par \par I have personally reviewed the patient's MRI in detail and discussed it with them which is significant for multiple levels of stenosis.\par \par Lidoderm patches.\par \par Pennsaid.\par \par Consider intercostal nerve blocks if pain persists.\par \par Physical therapy prescribed - goal will be to increase ROM, strengthening, postural training, other modalities ad letty which may include massage and stim. Goals of therapy discussed with the patient in detail and will be discussed with physical therapist. Patient will follow-up following course of physical therapy to monitor progress and adjust therapy as needed.\par \par Acetaminophen 1,000 mg q8h prn for moderate pain. Risks, benefits, and alternatives of acetaminophen discussed with patient.\par \par Diet and nutritional strategies discussed which may improve patients pain and will improve overall health.
Yes

## 2023-06-20 ENCOUNTER — NON-APPOINTMENT (OUTPATIENT)
Age: 78
End: 2023-06-20

## 2023-07-31 ENCOUNTER — APPOINTMENT (OUTPATIENT)
Dept: PAIN MANAGEMENT | Facility: CLINIC | Age: 78
End: 2023-07-31

## 2023-08-31 ENCOUNTER — NON-APPOINTMENT (OUTPATIENT)
Age: 78
End: 2023-08-31

## 2023-09-08 ENCOUNTER — NON-APPOINTMENT (OUTPATIENT)
Age: 78
End: 2023-09-08

## 2023-09-08 ENCOUNTER — APPOINTMENT (OUTPATIENT)
Dept: HEART AND VASCULAR | Facility: CLINIC | Age: 78
End: 2023-09-08
Payer: MEDICARE

## 2023-09-08 VITALS
SYSTOLIC BLOOD PRESSURE: 120 MMHG | DIASTOLIC BLOOD PRESSURE: 80 MMHG | RESPIRATION RATE: 18 BRPM | OXYGEN SATURATION: 98 % | HEART RATE: 82 BPM

## 2023-09-08 DIAGNOSIS — R20.0 ANESTHESIA OF SKIN: ICD-10-CM

## 2023-09-08 DIAGNOSIS — C91.10 CHRONIC LYMPHOCYTIC LEUKEMIA OF B-CELL TYPE NOT HAVING ACHIEVED REMISSION: ICD-10-CM

## 2023-09-08 DIAGNOSIS — I25.10 ATHEROSCLEROTIC HEART DISEASE OF NATIVE CORONARY ARTERY W/OUT ANGINA PECTORIS: ICD-10-CM

## 2023-09-08 PROCEDURE — 99214 OFFICE O/P EST MOD 30 MIN: CPT

## 2023-09-08 PROCEDURE — 93000 ELECTROCARDIOGRAM COMPLETE: CPT

## 2023-09-08 RX ORDER — ATORVASTATIN CALCIUM 10 MG/1
10 TABLET, FILM COATED ORAL
Qty: 90 | Refills: 3 | Status: ACTIVE | COMMUNITY
Start: 2022-05-24 | End: 1900-01-01

## 2023-09-08 NOTE — HISTORY OF PRESENT ILLNESS
[FreeTextEntry1] : 78 F female with CLL in remission dx 2012, treated up until Feb 2022.  Originally seen by cardiology for atypical chest pain.  Had extensive testing, noted to have minimal CAD on CCTA, unremarkable ECHO.    Walks all day, does her housekeeping, cooking, goes on elliptical for ~ 15 mins usually twice a day. No chest pain. No leg swelling.  No orthopnea.  No palps.   Has chronic low back pain issues, peripheral neuropathy which is her biggest limiting factor when exerting her self, walking stairs.    3/2/23:  no cp/sob/palps.  no dizziness/syncope.  no leg swelling.    9/8/23:  broke 6 ribs in March after a fall, had long recovery, doing better now.   No limitations with walking.  No sob.  No cp.

## 2023-09-08 NOTE — ASSESSMENT
[FreeTextEntry1] : 78F   Mild AI and MR Minimal CAD CLL - HLD -   EKG NSR, rate 76 Chest CT (jan 2022) - Major thoracic vessels are normal in appearance. Heart is normal in size. No pericardial effusion.  TST (jan 2021) - Mild mid sternal chest pain at peak exercise. Normal ST segment response to stress. Holter (jan 2021) - Predominantly NSR with rare PACs and PVCs Echo (oct 2020) - EF 62% - Normal LV size, function and wall thickness. Mildly increased LA size. Mild AV and MV regurgitation.  Cardiac CTA (oct 2020) - Ca+ score = 17. Minimal stenosis in prox LAD and D1. Minimal calcification of the AV.  Nuclear stress test (sept 2020) - mildly positive stress test apical ischemia.  - no active cardiac issues, no signs of heart failure or ischemia - she has some chronic peripheral neuropathy, has not had vascular testing.  Check ABIs/PVRs eval for obstructive PAD.  No exertional calf/thigh pain - BP controlled  - continue lipitor 10mg,  She had run out of meds.  New script sent.   Follow up recent lipid panel from PCP.

## 2023-09-13 ENCOUNTER — APPOINTMENT (OUTPATIENT)
Dept: PAIN MANAGEMENT | Facility: CLINIC | Age: 78
End: 2023-09-13
Payer: MEDICARE

## 2023-09-13 ENCOUNTER — TRANSCRIPTION ENCOUNTER (OUTPATIENT)
Age: 78
End: 2023-09-13

## 2023-09-13 DIAGNOSIS — G89.4 CHRONIC PAIN SYNDROME: ICD-10-CM

## 2023-09-13 DIAGNOSIS — F31.9 BIPOLAR DISORDER, UNSPECIFIED: ICD-10-CM

## 2023-09-13 DIAGNOSIS — S22.41XD MULTIPLE FRACTURES OF RIBS, RIGHT SIDE, SUBSEQUENT ENCOUNTER FOR FRACTURE WITH ROUTINE HEALING: ICD-10-CM

## 2023-09-13 PROCEDURE — 99213 OFFICE O/P EST LOW 20 MIN: CPT | Mod: 95

## 2023-11-27 ENCOUNTER — APPOINTMENT (OUTPATIENT)
Dept: GASTROENTEROLOGY | Facility: CLINIC | Age: 78
End: 2023-11-27

## 2023-11-27 ENCOUNTER — NON-APPOINTMENT (OUTPATIENT)
Age: 78
End: 2023-11-27

## 2023-11-28 ENCOUNTER — NON-APPOINTMENT (OUTPATIENT)
Age: 78
End: 2023-11-28

## 2023-11-28 ENCOUNTER — RESULT REVIEW (OUTPATIENT)
Age: 78
End: 2023-11-28

## 2023-11-28 ENCOUNTER — APPOINTMENT (OUTPATIENT)
Dept: SURGERY | Facility: CLINIC | Age: 78
End: 2023-11-28
Payer: MEDICARE

## 2023-11-28 VITALS
DIASTOLIC BLOOD PRESSURE: 84 MMHG | WEIGHT: 141 LBS | HEIGHT: 60 IN | TEMPERATURE: 98.5 F | SYSTOLIC BLOOD PRESSURE: 146 MMHG | HEART RATE: 80 BPM | OXYGEN SATURATION: 98 % | BODY MASS INDEX: 27.68 KG/M2

## 2023-11-28 PROCEDURE — 99203 OFFICE O/P NEW LOW 30 MIN: CPT

## 2023-12-04 ENCOUNTER — NON-APPOINTMENT (OUTPATIENT)
Age: 78
End: 2023-12-04

## 2023-12-04 DIAGNOSIS — I70.223 ATHEROSCLEROSIS OF NATIVE ARTERIES OF EXTREMITIES WITH REST PAIN, BILATERAL LEGS: ICD-10-CM

## 2023-12-05 ENCOUNTER — APPOINTMENT (OUTPATIENT)
Dept: SURGERY | Facility: CLINIC | Age: 78
End: 2023-12-05
Payer: MEDICARE

## 2023-12-05 VITALS
TEMPERATURE: 98.7 F | HEART RATE: 88 BPM | SYSTOLIC BLOOD PRESSURE: 150 MMHG | DIASTOLIC BLOOD PRESSURE: 83 MMHG | OXYGEN SATURATION: 98 %

## 2023-12-05 PROCEDURE — 99213 OFFICE O/P EST LOW 20 MIN: CPT

## 2023-12-05 RX ORDER — LIDOCAINE 5% 700 MG/1
5 PATCH TOPICAL
Qty: 30 | Refills: 2 | Status: ACTIVE | COMMUNITY
Start: 2023-06-15 | End: 1900-01-01

## 2023-12-08 ENCOUNTER — APPOINTMENT (OUTPATIENT)
Dept: FAMILY MEDICINE | Facility: CLINIC | Age: 78
End: 2023-12-08

## 2023-12-13 ENCOUNTER — NON-APPOINTMENT (OUTPATIENT)
Age: 78
End: 2023-12-13

## 2023-12-13 ENCOUNTER — APPOINTMENT (OUTPATIENT)
Dept: INTERNAL MEDICINE | Facility: CLINIC | Age: 78
End: 2023-12-13
Payer: MEDICARE

## 2023-12-13 VITALS
OXYGEN SATURATION: 98 % | HEIGHT: 60 IN | RESPIRATION RATE: 18 BRPM | HEART RATE: 88 BPM | DIASTOLIC BLOOD PRESSURE: 72 MMHG | WEIGHT: 141 LBS | SYSTOLIC BLOOD PRESSURE: 130 MMHG | BODY MASS INDEX: 27.68 KG/M2

## 2023-12-13 DIAGNOSIS — K41.90 UNILATERAL FEMORAL HERNIA, W/OUT OBSTRUCTION OR GANGRENE, NOT SPECIFIED AS RECURRENT: ICD-10-CM

## 2023-12-13 DIAGNOSIS — Z01.818 ENCOUNTER FOR OTHER PREPROCEDURAL EXAMINATION: ICD-10-CM

## 2023-12-13 PROCEDURE — 99214 OFFICE O/P EST MOD 30 MIN: CPT | Mod: 25

## 2023-12-13 PROCEDURE — 93000 ELECTROCARDIOGRAM COMPLETE: CPT

## 2023-12-13 PROCEDURE — 36415 COLL VENOUS BLD VENIPUNCTURE: CPT

## 2023-12-14 ENCOUNTER — RESULT REVIEW (OUTPATIENT)
Age: 78
End: 2023-12-14

## 2023-12-14 ENCOUNTER — APPOINTMENT (OUTPATIENT)
Dept: HEART AND VASCULAR | Facility: CLINIC | Age: 78
End: 2023-12-14
Payer: MEDICARE

## 2023-12-14 ENCOUNTER — TRANSCRIPTION ENCOUNTER (OUTPATIENT)
Age: 78
End: 2023-12-14

## 2023-12-14 ENCOUNTER — NON-APPOINTMENT (OUTPATIENT)
Age: 78
End: 2023-12-14

## 2023-12-14 VITALS
BODY MASS INDEX: 27.18 KG/M2 | DIASTOLIC BLOOD PRESSURE: 80 MMHG | SYSTOLIC BLOOD PRESSURE: 136 MMHG | TEMPERATURE: 97.2 F | HEART RATE: 90 BPM | OXYGEN SATURATION: 98 % | WEIGHT: 138.44 LBS | RESPIRATION RATE: 18 BRPM | HEIGHT: 60 IN

## 2023-12-14 LAB
ABO + RH PNL BLD: NORMAL
ALBUMIN SERPL ELPH-MCNC: 4.1 G/DL
ALP BLD-CCNC: 71 U/L
ALT SERPL-CCNC: 9 U/L
ANION GAP SERPL CALC-SCNC: 11 MMOL/L
AST SERPL-CCNC: 15 U/L
BASOPHILS # BLD AUTO: 0.07 K/UL
BASOPHILS NFR BLD AUTO: 1.2 %
BILIRUB SERPL-MCNC: 0.5 MG/DL
BUN SERPL-MCNC: 22 MG/DL
CALCIUM SERPL-MCNC: 9.1 MG/DL
CHLORIDE SERPL-SCNC: 107 MMOL/L
CO2 SERPL-SCNC: 22 MMOL/L
CREAT SERPL-MCNC: 0.76 MG/DL
EGFR: 80 ML/MIN/1.73M2
EOSINOPHIL # BLD AUTO: 0.14 K/UL
EOSINOPHIL NFR BLD AUTO: 2.4 %
GLUCOSE SERPL-MCNC: 111 MG/DL
HCT VFR BLD CALC: 38.9 %
HGB BLD-MCNC: 12.3 G/DL
IMM GRANULOCYTES NFR BLD AUTO: 0.2 %
LYMPHOCYTES # BLD AUTO: 1.71 K/UL
LYMPHOCYTES NFR BLD AUTO: 29.4 %
MAN DIFF?: NORMAL
MCHC RBC-ENTMCNC: 31.5 PG
MCHC RBC-ENTMCNC: 31.6 GM/DL
MCV RBC AUTO: 99.5 FL
MONOCYTES # BLD AUTO: 0.53 K/UL
MONOCYTES NFR BLD AUTO: 9.1 %
NEUTROPHILS # BLD AUTO: 3.36 K/UL
NEUTROPHILS NFR BLD AUTO: 57.7 %
PLATELET # BLD AUTO: 212 K/UL
POTASSIUM SERPL-SCNC: 4.6 MMOL/L
PROT SERPL-MCNC: 6 G/DL
RBC # BLD: 3.91 M/UL
RBC # FLD: 12.7 %
SODIUM SERPL-SCNC: 141 MMOL/L
WBC # FLD AUTO: 5.82 K/UL

## 2023-12-14 PROCEDURE — 99204 OFFICE O/P NEW MOD 45 MIN: CPT | Mod: 25

## 2023-12-14 PROCEDURE — 93000 ELECTROCARDIOGRAM COMPLETE: CPT

## 2023-12-14 NOTE — PHYSICAL EXAM
[Well Developed] : well developed [Well Nourished] : well nourished [No Acute Distress] : no acute distress [Normal Conjunctiva] : normal conjunctiva [Normal Venous Pressure] : normal venous pressure [No Carotid Bruit] : no carotid bruit [Normal S1, S2] : normal S1, S2 [No Rub] : no rub [No Gallop] : no gallop [Clear Lung Fields] : clear lung fields [Good Air Entry] : good air entry [No Respiratory Distress] : no respiratory distress  [Soft] : abdomen soft [Non Tender] : non-tender [No Masses/organomegaly] : no masses/organomegaly [Normal Bowel Sounds] : normal bowel sounds [Normal Gait] : normal gait [No Edema] : no edema [No Cyanosis] : no cyanosis [No Clubbing] : no clubbing [No Varicosities] : no varicosities [No Rash] : no rash [No Skin Lesions] : no skin lesions [Moves all extremities] : moves all extremities [No Focal Deficits] : no focal deficits [Normal Speech] : normal speech [Alert and Oriented] : alert and oriented [Normal memory] : normal memory [de-identified] : Lt sternal border 2/6 systolic murmur

## 2023-12-14 NOTE — DISCUSSION/SUMMARY
[FreeTextEntry1] : 77 YO F asymptomatic with _ve CCTA/SPECT in the past 3 years, Nl EF and no sig valvular disease for a pre-op risk stratification. EKG is unchanged ( lead placement is different on the EKG today)  Patient is low risk for an intermediate risk surgery. No further cardiac testing needed

## 2023-12-14 NOTE — ASSESSMENT
[Patient Optimized for Surgery] : Patient optimized for surgery [FreeTextEntry4] : Patient's vitals are stable.  Patient blood work is stable.  Patient is aware of the risk and benefits of the surgery.  It is with this knowledge she would like to proceed.  Patient can be medically cleared for surgery.

## 2023-12-14 NOTE — HISTORY OF PRESENT ILLNESS
[FreeTextEntry1] : 77 YO F with hx of CLL in remission now, patient has no chest pain/ARRIAGA/syncope/palpitations. She has had CCTA/spect and TTE in the past 3 years which are unremarkable. No change in symptoms. EKG today is unchanged.  Mild AI and MR Minimal CAD EKG NSR, rate 76 Chest CT (jan 2022) - Major thoracic vessels are normal in appearance. Heart is normal in size. No pericardial effusion. TST (jan 2021) - Mild mid sternal chest pain at peak exercise. Normal ST segment response to stress. Holter (jan 2021) - Predominantly NSR with rare PACs and PVCs Echo (oct 2020) - EF 62% - Normal LV size, function and wall thickness. Mildly increased LA size. Mild AV and MV regurgitation. Cardiac CTA (oct 2020) - Ca+ score = 17. Minimal stenosis in prox LAD and D1. Minimal calcification of the AV. Nuclear stress test (sept 2020) - mildly positive stress test apical ischemia.

## 2023-12-14 NOTE — HISTORY OF PRESENT ILLNESS
[Coronary Artery Disease] : coronary artery disease [(Patient denies any chest pain, claudication, dyspnea on exertion, orthopnea, palpitations or syncope)] : Patient denies any chest pain, claudication, dyspnea on exertion, orthopnea, palpitations or syncope [FreeTextEntry4] : Patient is here for preop evaluation for femoral hernia surgery.  Patient is aware of the risk and benefits of the surgery does with this knowledge she would like to continue.  Patient has no acute complaints today.  Only has her chronic  pain complaints [FreeTextEntry7] : Patient has no chest pain EKG is stable.

## 2023-12-18 ENCOUNTER — APPOINTMENT (OUTPATIENT)
Dept: SURGERY | Facility: HOSPITAL | Age: 78
End: 2023-12-18

## 2023-12-18 ENCOUNTER — TRANSCRIPTION ENCOUNTER (OUTPATIENT)
Age: 78
End: 2023-12-18

## 2023-12-18 ENCOUNTER — RESULT REVIEW (OUTPATIENT)
Age: 78
End: 2023-12-18

## 2023-12-21 ENCOUNTER — NON-APPOINTMENT (OUTPATIENT)
Age: 78
End: 2023-12-21

## 2023-12-21 NOTE — CONSULT LETTER
[Dear  ___] : Dear  [unfilled], [Consult Letter:] : I had the pleasure of evaluating your patient, [unfilled]. [Please see my note below.] : Please see my note below. [Consult Closing:] : Thank you very much for allowing me to participate in the care of this patient.  If you have any questions, please do not hesitate to contact me. [Sincerely,] : Sincerely, [FreeTextEntry3] : Ron Bennett MD, MPH\par Attending Physician\par Hematology Oncology\par [facility]\par [facility]\par  0

## 2024-01-02 ENCOUNTER — NON-APPOINTMENT (OUTPATIENT)
Age: 79
End: 2024-01-02

## 2024-01-09 ENCOUNTER — APPOINTMENT (OUTPATIENT)
Dept: SURGERY | Facility: CLINIC | Age: 79
End: 2024-01-09
Payer: MEDICARE

## 2024-01-09 VITALS
SYSTOLIC BLOOD PRESSURE: 128 MMHG | TEMPERATURE: 98.1 F | OXYGEN SATURATION: 97 % | HEART RATE: 75 BPM | DIASTOLIC BLOOD PRESSURE: 74 MMHG

## 2024-01-09 PROCEDURE — 99024 POSTOP FOLLOW-UP VISIT: CPT

## 2024-01-22 ENCOUNTER — APPOINTMENT (OUTPATIENT)
Dept: GASTROENTEROLOGY | Facility: CLINIC | Age: 79
End: 2024-01-22

## 2024-02-05 ENCOUNTER — APPOINTMENT (OUTPATIENT)
Dept: GASTROENTEROLOGY | Facility: CLINIC | Age: 79
End: 2024-02-05
Payer: MEDICARE

## 2024-02-05 VITALS
BODY MASS INDEX: 27.48 KG/M2 | HEART RATE: 93 BPM | HEIGHT: 60 IN | OXYGEN SATURATION: 97 % | SYSTOLIC BLOOD PRESSURE: 112 MMHG | WEIGHT: 140 LBS | RESPIRATION RATE: 15 BRPM | DIASTOLIC BLOOD PRESSURE: 96 MMHG

## 2024-02-05 DIAGNOSIS — K59.09 OTHER CONSTIPATION: ICD-10-CM

## 2024-02-05 PROCEDURE — 99214 OFFICE O/P EST MOD 30 MIN: CPT

## 2024-02-05 RX ORDER — ERGOCALCIFEROL 1.25 MG/1
1.25 MG CAPSULE, LIQUID FILLED ORAL
Qty: 12 | Refills: 0 | Status: DISCONTINUED | COMMUNITY
Start: 2022-09-06 | End: 2024-02-05

## 2024-02-05 RX ORDER — DICLOFENAC SODIUM 20 MG/G
2 SOLUTION TOPICAL
Qty: 1 | Refills: 1 | Status: DISCONTINUED | COMMUNITY
Start: 2023-09-12 | End: 2024-02-05

## 2024-02-05 RX ORDER — TRAMADOL HYDROCHLORIDE 50 MG/1
50 TABLET, COATED ORAL 4 TIMES DAILY
Qty: 120 | Refills: 0 | Status: DISCONTINUED | COMMUNITY
Start: 2022-09-01 | End: 2024-02-05

## 2024-02-05 RX ORDER — PYRITHIONE ZINC 1 G/ML
LOTION/SHAMPOO TOPICAL
Refills: 0 | Status: ACTIVE | COMMUNITY

## 2024-02-05 RX ORDER — CASANTHRANOL/DOCUSATE SODIUM 30MG-100MG
CAPSULE ORAL
Refills: 0 | Status: ACTIVE | COMMUNITY

## 2024-02-05 RX ORDER — BENZONATATE 100 MG/1
100 CAPSULE ORAL
Qty: 15 | Refills: 0 | Status: DISCONTINUED | COMMUNITY
Start: 2022-10-06 | End: 2024-02-05

## 2024-02-05 RX ORDER — TIZANIDINE 2 MG/1
2 TABLET ORAL
Qty: 60 | Refills: 2 | Status: DISCONTINUED | COMMUNITY
Start: 2023-03-22 | End: 2024-02-05

## 2024-02-05 RX ORDER — ERGOCALCIFEROL 1.25 MG/1
1.25 MG CAPSULE ORAL
Qty: 8 | Refills: 0 | Status: DISCONTINUED | COMMUNITY
Start: 2022-09-02 | End: 2024-02-05

## 2024-02-06 LAB
ALBUMIN SERPL ELPH-MCNC: 4.4 G/DL
ALP BLD-CCNC: 78 U/L
ALT SERPL-CCNC: 13 U/L
ANION GAP SERPL CALC-SCNC: 14 MMOL/L
AST SERPL-CCNC: 17 U/L
BASOPHILS # BLD AUTO: 0.06 K/UL
BASOPHILS NFR BLD AUTO: 0.9 %
BILIRUB SERPL-MCNC: 0.6 MG/DL
BUN SERPL-MCNC: 16 MG/DL
CALCIUM SERPL-MCNC: 9.6 MG/DL
CHLORIDE SERPL-SCNC: 106 MMOL/L
CO2 SERPL-SCNC: 23 MMOL/L
CREAT SERPL-MCNC: 0.74 MG/DL
CRP SERPL-MCNC: <3 MG/L
EGFR: 82 ML/MIN/1.73M2
EOSINOPHIL # BLD AUTO: 0.13 K/UL
EOSINOPHIL NFR BLD AUTO: 1.9 %
GLIADIN IGA SER QL: <5 UNITS
GLIADIN IGG SER QL: <5 UNITS
GLIADIN PEPTIDE IGA SER-ACNC: NEGATIVE
GLIADIN PEPTIDE IGG SER-ACNC: NEGATIVE
GLUCOSE SERPL-MCNC: 95 MG/DL
HCT VFR BLD CALC: 39.3 %
HGB BLD-MCNC: 12.5 G/DL
IMM GRANULOCYTES NFR BLD AUTO: 0.1 %
LYMPHOCYTES # BLD AUTO: 1.71 K/UL
LYMPHOCYTES NFR BLD AUTO: 25.1 %
MAN DIFF?: NORMAL
MCHC RBC-ENTMCNC: 30.1 PG
MCHC RBC-ENTMCNC: 31.8 GM/DL
MCV RBC AUTO: 94.7 FL
MONOCYTES # BLD AUTO: 0.53 K/UL
MONOCYTES NFR BLD AUTO: 7.8 %
NEUTROPHILS # BLD AUTO: 4.36 K/UL
NEUTROPHILS NFR BLD AUTO: 64.2 %
PLATELET # BLD AUTO: 213 K/UL
POTASSIUM SERPL-SCNC: 4.3 MMOL/L
PROT SERPL-MCNC: 6.4 G/DL
RBC # BLD: 4.15 M/UL
RBC # FLD: 12.8 %
SODIUM SERPL-SCNC: 143 MMOL/L
TSH SERPL-ACNC: 2 UIU/ML
TTG IGA SER IA-ACNC: <1.2 U/ML
TTG IGA SER-ACNC: NEGATIVE
TTG IGG SER IA-ACNC: 4.2 U/ML
TTG IGG SER IA-ACNC: NEGATIVE
WBC # FLD AUTO: 6.8 K/UL

## 2024-02-06 NOTE — HISTORY OF PRESENT ILLNESS
[de-identified] : May18th, 2022 (Dr. Haley); findings of erosive esophagitis grade 4, no stricture, small hiatal hernia, [FreeTextEntry1] : August 2022 (Dr. Haley) -- normal mucosa, no polyps

## 2024-02-06 NOTE — ASSESSMENT
[FreeTextEntry1] : Labs in the office to r/o any infection vs inflammation vs celiac disease.  1. Reviewed dietary and lifestyle modifications  2. Encouraged High fiber diet (goal of 20-30 grams fiber/day) 3. Adequate water (40+ ounces/day) 4. Encouraged Aerobic exercise 30 mins/day as tolerated. 5. Daily MiraLAX up to twice a day for the next 5- 7 days than daily as needed. Can trial enema x 1 today prior to miralax to help with evacuation.  6. Stimulant/bulk laxatives as needed: Benefiter Vs. Citrucel; dosing discussed in length.  7. Reinforced importance of paying close attention to body's signals to have BM; Ignoring body's signals to have a bowel movement causes signals to become weaker over time. 8. Stress management/reduction  9. Keep a food diary log for the next 14 days.  10. Medical therapy to be considered if symptoms remain significant after lifestyle changes ( low dose antidepressants, anticholinergics, alternative medications such as aloe, peppermint oil ect.)  11. F/U in 4 weeks if symptoms persist  12. Consider nutritionist consult to help aide with constipation

## 2024-02-06 NOTE — PHYSICAL EXAM

## 2024-02-08 LAB — IGA SER QL IEP: 155 MG/DL

## 2024-02-09 LAB
ENDOMYSIUM IGA SER QL: NEGATIVE
ENDOMYSIUM IGA TITR SER: NORMAL

## 2024-02-16 ENCOUNTER — NON-APPOINTMENT (OUTPATIENT)
Age: 79
End: 2024-02-16

## 2024-03-07 NOTE — PHYSICAL EXAM
[General Appearance - Well Developed] : well developed [Normal Appearance] : normal appearance [General Appearance - Well Nourished] : well nourished [Well Groomed] : well groomed [No Deformities] : no deformities [General Appearance - In No Acute Distress] : no acute distress [Normal Conjunctiva] : the conjunctiva exhibited no abnormalities [Normal Oral Mucosa] : normal oral mucosa [Eyelids - No Xanthelasma] : the eyelids demonstrated no xanthelasmas [No Oral Pallor] : no oral pallor [Normal Jugular Venous A Waves Present] : normal jugular venous A waves present [No Oral Cyanosis] : no oral cyanosis [Normal Jugular Venous V Waves Present] : normal jugular venous V waves present [No Jugular Venous Phillips A Waves] : no jugular venous phillips A waves [Respiration, Rhythm And Depth] : normal respiratory rhythm and effort [Exaggerated Use Of Accessory Muscles For Inspiration] : no accessory muscle use [Auscultation Breath Sounds / Voice Sounds] : lungs were clear to auscultation bilaterally [Heart Sounds] : normal S1 and S2 [Heart Rate And Rhythm] : heart rate and rhythm were normal [Abdomen Soft] : soft [Murmurs] : no murmurs present [Abdomen Tenderness] : non-tender [Abdomen Mass (___ Cm)] : no abdominal mass palpated [Abnormal Walk] : normal gait [Gait - Sufficient For Exercise Testing] : the gait was sufficient for exercise testing [Nail Clubbing] : no clubbing of the fingernails [Cyanosis, Localized] : no localized cyanosis [Petechial Hemorrhages (___cm)] : no petechial hemorrhages [Skin Color & Pigmentation] : normal skin color and pigmentation [] : no rash [No Venous Stasis] : no venous stasis [No Skin Ulcers] : no skin ulcer [Skin Lesions] : no skin lesions [No Xanthoma] : no  xanthoma was observed [Affect] : the affect was normal [Oriented To Time, Place, And Person] : oriented to person, place, and time [Mood] : the mood was normal [No Anxiety] : not feeling anxious

## 2024-03-08 ENCOUNTER — APPOINTMENT (OUTPATIENT)
Dept: HEART AND VASCULAR | Facility: CLINIC | Age: 79
End: 2024-03-08

## 2024-03-18 ENCOUNTER — APPOINTMENT (OUTPATIENT)
Dept: SURGERY | Facility: CLINIC | Age: 79
End: 2024-03-18
Payer: MEDICARE

## 2024-03-18 VITALS
HEART RATE: 90 BPM | SYSTOLIC BLOOD PRESSURE: 126 MMHG | DIASTOLIC BLOOD PRESSURE: 88 MMHG | OXYGEN SATURATION: 98 % | TEMPERATURE: 98.8 F

## 2024-03-18 DIAGNOSIS — K40.90 UNILATERAL INGUINAL HERNIA, W/OUT OBSTRUCTION OR GANGRENE, NOT SPECIFIED AS RECURRENT: ICD-10-CM

## 2024-03-18 DIAGNOSIS — R10.32 LEFT LOWER QUADRANT PAIN: ICD-10-CM

## 2024-03-18 DIAGNOSIS — K41.90 UNILATERAL FEMORAL HERNIA, W/OUT OBSTRUCTION OR GANGRENE, NOT SPECIFIED AS RECURRENT: ICD-10-CM

## 2024-03-18 PROCEDURE — 99213 OFFICE O/P EST LOW 20 MIN: CPT

## 2024-03-18 NOTE — PLAN
[FreeTextEntry1] : This is a 78-year-old female who had an open right inguinal hernia repair with mesh done in December 2023.  Since that surgery she has felt well and had no complaints on that side.  She had a known left inguinal hernia with plans to have that fixed later this year after a full recovery from her initial surgery.  However, she has noticed increased symptoms and pain on the left side because of this new pain came in to be evaluated.  She denies nausea or vomiting.  She denies a noticeable lump.  She denies redness of her skin.  On exam in the standing and sitting position she has a reducible left inguinal hernia that is nontender.  She has no obvious femoral hernia or incarceration.  Plan: As she clinically has recovered from her initial surgery and now that her left hernia is becoming more symptomatic I recommended that she see Dr. Garcia to schedule repair of this left inguinal hernia sooner than was originally planned.  I explained to her that if she develops symptoms of incarceration she should go to the emergency room and she may need to be addressed emergently by suspect that this can be easily done electively in the near future.

## 2024-05-07 ENCOUNTER — APPOINTMENT (OUTPATIENT)
Dept: SURGERY | Facility: CLINIC | Age: 79
End: 2024-05-07

## 2024-06-11 ENCOUNTER — RESULT REVIEW (OUTPATIENT)
Age: 79
End: 2024-06-11

## 2024-06-11 ENCOUNTER — APPOINTMENT (OUTPATIENT)
Dept: HEMATOLOGY ONCOLOGY | Facility: CLINIC | Age: 79
End: 2024-06-11

## 2024-06-11 ENCOUNTER — APPOINTMENT (OUTPATIENT)
Dept: SURGERY | Facility: CLINIC | Age: 79
End: 2024-06-11
Payer: MEDICARE

## 2024-06-11 VITALS
BODY MASS INDEX: 28.66 KG/M2 | HEIGHT: 60 IN | SYSTOLIC BLOOD PRESSURE: 141 MMHG | OXYGEN SATURATION: 97 % | HEART RATE: 83 BPM | RESPIRATION RATE: 16 BRPM | TEMPERATURE: 98.2 F | DIASTOLIC BLOOD PRESSURE: 73 MMHG | WEIGHT: 146 LBS

## 2024-06-11 VITALS
HEART RATE: 84 BPM | TEMPERATURE: 98.3 F | OXYGEN SATURATION: 98 % | SYSTOLIC BLOOD PRESSURE: 147 MMHG | DIASTOLIC BLOOD PRESSURE: 75 MMHG

## 2024-06-11 PROCEDURE — 99213 OFFICE O/P EST LOW 20 MIN: CPT

## 2024-06-11 NOTE — ASSESSMENT
[FreeTextEntry1] : Patient presents today to discuss repairing her left inguinal hernia.  Patient states she has pain in the left groin and it is worse with exercise standing and walking.  She has had no nausea or vomiting.  She has no change in bowel habits.  Patient feels a lump in the left groin.  This lump gets more aggravated when she becomes constipated.  When she is constipated this causes her more discomfort in the left groin as well as in the buttock region.  She now wants to have her left inguinal hernia repaired  Physical examination patient examined erect and supine position.  In the erect position she has a mass noted in the left groin.  In the supine position this mass is still appreciated and noted just lateral to the pubic tubercle.  This mass is not reducible in the supine position.  No obvious findings in the right groin.  Chronically incarcerated left inguinal hernia, left groin pain, history of constipation: This is a 78-year-old female with a history of constipation who is also noted to have a left inguinal hernia.  Given her description lately I think her hernia has become symptomatic and at this point I would recommend repairing the hernia.  The patient should undergo an open repair.  Mesh will not be used unless necessary at the time of surgery.  The indications alternatives and complication of procedure discussed questions answered written consent obtained today

## 2024-06-27 ENCOUNTER — NON-APPOINTMENT (OUTPATIENT)
Age: 79
End: 2024-06-27

## 2024-06-28 ENCOUNTER — NON-APPOINTMENT (OUTPATIENT)
Age: 79
End: 2024-06-28

## 2024-06-28 ENCOUNTER — APPOINTMENT (OUTPATIENT)
Dept: HEART AND VASCULAR | Facility: CLINIC | Age: 79
End: 2024-06-28
Payer: MEDICARE

## 2024-06-28 VITALS
OXYGEN SATURATION: 97 % | BODY MASS INDEX: 28.07 KG/M2 | HEART RATE: 95 BPM | SYSTOLIC BLOOD PRESSURE: 142 MMHG | DIASTOLIC BLOOD PRESSURE: 82 MMHG | HEIGHT: 60 IN | WEIGHT: 143 LBS

## 2024-06-28 DIAGNOSIS — E78.49 OTHER HYPERLIPIDEMIA: ICD-10-CM

## 2024-06-28 DIAGNOSIS — Z01.810 ENCOUNTER FOR PREPROCEDURAL CARDIOVASCULAR EXAMINATION: ICD-10-CM

## 2024-06-28 DIAGNOSIS — G62.9 POLYNEUROPATHY, UNSPECIFIED: ICD-10-CM

## 2024-06-28 PROCEDURE — 93000 ELECTROCARDIOGRAM COMPLETE: CPT

## 2024-06-28 PROCEDURE — 99214 OFFICE O/P EST MOD 30 MIN: CPT

## 2024-07-03 ENCOUNTER — RESULT REVIEW (OUTPATIENT)
Age: 79
End: 2024-07-03

## 2024-07-03 ENCOUNTER — APPOINTMENT (OUTPATIENT)
Dept: FAMILY MEDICINE | Facility: CLINIC | Age: 79
End: 2024-07-03
Payer: MEDICARE

## 2024-07-03 VITALS
WEIGHT: 143 LBS | HEART RATE: 81 BPM | OXYGEN SATURATION: 98 % | SYSTOLIC BLOOD PRESSURE: 130 MMHG | BODY MASS INDEX: 28.07 KG/M2 | DIASTOLIC BLOOD PRESSURE: 80 MMHG | TEMPERATURE: 97.3 F | HEIGHT: 60 IN

## 2024-07-03 DIAGNOSIS — K40.90 UNILATERAL INGUINAL HERNIA, W/OUT OBSTRUCTION OR GANGRENE, NOT SPECIFIED AS RECURRENT: ICD-10-CM

## 2024-07-03 DIAGNOSIS — I25.10 ATHEROSCLEROTIC HEART DISEASE OF NATIVE CORONARY ARTERY W/OUT ANGINA PECTORIS: ICD-10-CM

## 2024-07-03 DIAGNOSIS — Z01.818 ENCOUNTER FOR OTHER PREPROCEDURAL EXAMINATION: ICD-10-CM

## 2024-07-03 DIAGNOSIS — E78.49 OTHER HYPERLIPIDEMIA: ICD-10-CM

## 2024-07-03 DIAGNOSIS — C91.10 CHRONIC LYMPHOCYTIC LEUKEMIA OF B-CELL TYPE NOT HAVING ACHIEVED REMISSION: ICD-10-CM

## 2024-07-03 PROCEDURE — 99215 OFFICE O/P EST HI 40 MIN: CPT

## 2024-07-06 PROBLEM — K40.90 NON-RECURRENT UNILATERAL INGUINAL HERNIA WITHOUT OBSTRUCTION OR GANGRENE: Status: ACTIVE | Noted: 2024-07-06

## 2024-07-06 PROBLEM — Z01.818 PREOPERATIVE CLEARANCE: Status: ACTIVE | Noted: 2024-07-06

## 2024-07-06 PROBLEM — I25.10 ATHEROSCLEROSIS OF NATIVE CORONARY ARTERY OF NATIVE HEART WITHOUT ANGINA PECTORIS: Status: ACTIVE | Noted: 2024-07-06

## 2024-07-12 ENCOUNTER — RESULT REVIEW (OUTPATIENT)
Age: 79
End: 2024-07-12

## 2024-07-12 ENCOUNTER — APPOINTMENT (OUTPATIENT)
Dept: SURGERY | Facility: HOSPITAL | Age: 79
End: 2024-07-12

## 2024-07-12 ENCOUNTER — TRANSCRIPTION ENCOUNTER (OUTPATIENT)
Age: 79
End: 2024-07-12

## 2024-07-17 ENCOUNTER — NON-APPOINTMENT (OUTPATIENT)
Age: 79
End: 2024-07-17

## 2024-07-18 ENCOUNTER — NON-APPOINTMENT (OUTPATIENT)
Age: 79
End: 2024-07-18

## 2024-07-23 ENCOUNTER — APPOINTMENT (OUTPATIENT)
Dept: SURGERY | Facility: CLINIC | Age: 79
End: 2024-07-23
Payer: MEDICARE

## 2024-07-23 VITALS
HEART RATE: 103 BPM | SYSTOLIC BLOOD PRESSURE: 124 MMHG | TEMPERATURE: 98 F | OXYGEN SATURATION: 98 % | DIASTOLIC BLOOD PRESSURE: 73 MMHG

## 2024-07-23 PROCEDURE — 99212 OFFICE O/P EST SF 10 MIN: CPT

## 2024-07-23 NOTE — ASSESSMENT
[FreeTextEntry1] : Patient presents today for follow-up after open left inguinal hernia repair.  She states she still has a significant amount of discomfort.  It is manageable with some narcotics here and there.  The discomfort is improving on a daily basis.  In terms of numbness patient states she is always numb in the extremities and lower abdomen secondary to her peripheral neuropathy.  She has no other complaints.  Physical examination: Patient examined erect and supine position.  Abdomen soft nontender nondistended incisions healed very nicely she has a significant amount of postoperative swelling still.  There is no erythema there is no ecchymosis no recurrence  Impression/plan status post open left inguinal hernia repair patient doing well surgically no acute findings.  Will return in 1 month's time for reevaluation.  I suspect her pain will continue to improve over the next month.

## 2024-08-02 ENCOUNTER — APPOINTMENT (OUTPATIENT)
Dept: INTERNAL MEDICINE | Facility: CLINIC | Age: 79
End: 2024-08-02
Payer: MEDICARE

## 2024-08-02 VITALS
SYSTOLIC BLOOD PRESSURE: 124 MMHG | HEART RATE: 87 BPM | DIASTOLIC BLOOD PRESSURE: 80 MMHG | TEMPERATURE: 97.7 F | HEIGHT: 60 IN | WEIGHT: 144 LBS | OXYGEN SATURATION: 97 % | BODY MASS INDEX: 28.27 KG/M2

## 2024-08-02 DIAGNOSIS — I51.7 CARDIOMEGALY: ICD-10-CM

## 2024-08-02 DIAGNOSIS — K22.10 ULCER OF ESOPHAGUS W/OUT BLEEDING: ICD-10-CM

## 2024-08-02 DIAGNOSIS — R53.83 OTHER FATIGUE: ICD-10-CM

## 2024-08-02 DIAGNOSIS — Z87.39 PERSONAL HISTORY OF OTHER DISEASES OF THE MUSCULOSKELETAL SYSTEM AND CONNECTIVE TISSUE: ICD-10-CM

## 2024-08-02 DIAGNOSIS — Z87.898 PERSONAL HISTORY OF OTHER SPECIFIED CONDITIONS: ICD-10-CM

## 2024-08-02 DIAGNOSIS — G25.81 RESTLESS LEGS SYNDROME: ICD-10-CM

## 2024-08-02 DIAGNOSIS — R29.6 REPEATED FALLS: ICD-10-CM

## 2024-08-02 DIAGNOSIS — F31.9 BIPOLAR DISORDER, UNSPECIFIED: ICD-10-CM

## 2024-08-02 PROCEDURE — 99214 OFFICE O/P EST MOD 30 MIN: CPT

## 2024-08-02 PROCEDURE — G0444 DEPRESSION SCREEN ANNUAL: CPT | Mod: 59

## 2024-08-02 PROCEDURE — G2211 COMPLEX E/M VISIT ADD ON: CPT

## 2024-08-02 RX ORDER — OXYCODONE 5 MG/1
5 TABLET ORAL EVERY 4 HOURS
Qty: 40 | Refills: 0 | Status: DISCONTINUED | COMMUNITY
Start: 2024-07-23 | End: 2024-08-02

## 2024-08-05 PROBLEM — Z87.898 HISTORY OF DYSURIA: Status: RESOLVED | Noted: 2022-09-01 | Resolved: 2024-08-05

## 2024-08-05 PROBLEM — Z87.898 HISTORY OF NECK SWELLING: Status: RESOLVED | Noted: 2019-04-10 | Resolved: 2024-08-05

## 2024-08-05 PROBLEM — Z87.39 HISTORY OF MUSCLE SPASM: Status: RESOLVED | Noted: 2020-07-14 | Resolved: 2024-08-05

## 2024-08-05 PROBLEM — R53.83 EXHAUSTION: Status: RESOLVED | Noted: 2020-04-29 | Resolved: 2024-08-05

## 2024-08-05 NOTE — HEALTH RISK ASSESSMENT
[No] : In the past 12 months have you used drugs other than those required for medical reasons? No [0] : 2) Feeling down, depressed, or hopeless: Not at all (0) [PHQ-2 Negative - No further assessment needed] : PHQ-2 Negative - No further assessment needed [Former] : Former [< 15 Years] : < 15 Years [Audit-CScore] : 0 [[id number]] : 0

## 2024-08-05 NOTE — PHYSICAL EXAM
[No Acute Distress] : no acute distress [Normal Voice/Communication] : normal voice/communication [No Lymphadenopathy] : no lymphadenopathy [Normal] : soft, non-tender, non-distended, no masses palpated, no HSM and normal bowel sounds [de-identified] : Mild tenderness right paravertebral muscle.  No spine tenderness [de-identified] : Gait is slow and shuffling.

## 2024-08-05 NOTE — PHYSICAL EXAM
[No Acute Distress] : no acute distress [Normal Voice/Communication] : normal voice/communication [No Lymphadenopathy] : no lymphadenopathy [Normal] : soft, non-tender, non-distended, no masses palpated, no HSM and normal bowel sounds [de-identified] : Mild tenderness right paravertebral muscle.  No spine tenderness [de-identified] : Gait is slow and shuffling.

## 2024-08-05 NOTE — REVIEW OF SYSTEMS
[Heartburn] : heartburn [Joint Stiffness] : joint stiffness [Muscle Pain] : muscle pain [Back Pain] : back pain [Unsteady Walking] : ataxia [Fever] : no fever [Nasal Discharge] : no nasal discharge [Chest Pain] : no chest pain [Palpitations] : no palpitations [Shortness Of Breath] : no shortness of breath [Cough] : no cough [Abdominal Pain] : no abdominal pain [Nausea] : no nausea [Constipation] : no constipation [Diarrhea] : diarrhea [Vomiting] : no vomiting [Dysuria] : no dysuria [Hematuria] : no hematuria [Frequency] : no frequency [Joint Swelling] : no joint swelling [Fainting] : no fainting [FreeTextEntry7] : No blood in stool

## 2024-08-05 NOTE — HISTORY OF PRESENT ILLNESS
[FreeTextEntry1] : Pt wants hospital bed. Walks with cane.  She has a history of recurrent falls because of unsteady walking because of spinal stenosis, leg neuropathy for which she is seeing neurology and pain management. She states she needs a hospital bed because of the neuropathy, acid reflux and other medical conditions.  She needs to have her legs elevated when she sleeps but the pillows do not help.  For the reflux, she wants to have pillows behind her head but she eventually moves her head in her sleep and wakes up with reflux.

## 2024-08-05 NOTE — HEALTH RISK ASSESSMENT
[No] : In the past 12 months have you used drugs other than those required for medical reasons? No [0] : 2) Feeling down, depressed, or hopeless: Not at all (0) [PHQ-2 Negative - No further assessment needed] : PHQ-2 Negative - No further assessment needed [Former] : Former [< 15 Years] : < 15 Years [Audit-CScore] : 0 [[id number]] : 0

## 2024-08-06 ENCOUNTER — TRANSCRIPTION ENCOUNTER (OUTPATIENT)
Age: 79
End: 2024-08-06

## 2024-08-06 ENCOUNTER — APPOINTMENT (OUTPATIENT)
Dept: NEUROLOGY | Facility: CLINIC | Age: 79
End: 2024-08-06

## 2024-08-06 PROCEDURE — 99215 OFFICE O/P EST HI 40 MIN: CPT

## 2024-08-06 NOTE — HISTORY OF PRESENT ILLNESS
[FreeTextEntry1] : Nancy Carmichael is a 80yo here for follow-up regarding peripheral polyneuropathy. Previously seen by MD Leary - last clinic visit 3/2023. EMG in 2022 showed moderate peripheral neuropathy. Reports neuropathic pain is persistent, described as burning, numbness from bilateral feet up to mid-thigh (sometimes up to groin) and bilateral hands up to shoulders.  Describes walking on painful pins and needles / sharp glass.  Muscle spasms in bilateral legs in evenings. Sensitivity to tight clothing. Previous medications tried- gabapentin - headaches lyrica- brain fog lamictal - could not tolerate Follows close with psychiatrist for bipolar - currently on seroquel and klonopin.  Re-establishing care with new provider MD Haynes due to previous provider retiring. Recently had inguinal hernia repair in which she takes oxycodone 5mg PRN for pain.  States does not take on daily basis, 2 pills left which does help with neuropathic pain as well. Tramadol in the past helped but for intervals of 2-3 hours.  Lidocaine patches helped as well but ran out.   She is interested in hospital bed due to pain exacerbated upon waking up mornings, difficulty getting out of bed.  Attempts to elevate feet so she is not flat on her back, but pillows tend to shift.   Most recent fall x 1 month ago, describes any uneven surfaces she tends to lose balance.  Uses cane for short distances, and rollator walker with seat at times.  Plans to see pain management Dr Cochran in upcoming weeks.  In the past interested in spinal cord stimulator to help manage symptoms. Stretching on daily basis.  Not interested in PT due to having to use bus for transportation.  Looking into trial of TENS machine.

## 2024-08-06 NOTE — DATA REVIEWED
[de-identified] :  [place] MRI Report             Final  No Documents Attached       [place] [address]                                        Department of Radiology                                             [phone]   Patient Name:      JARAD RAMIREZ                Location:       [facility] Rec #:        [id number]                    Account #:      [id number] YOB: 1945                    Ordering:       Angel Leary MD Age: 74               Sex:    F                 Attending:      Angel Leary MD PCP:        NO PRIMARY CARE PHYSICIAN ______________________________________________________________________________________  Exam Date:      12/24/19 Exam:         MRI BRAIN WAW IC Order#:       MRI [id number]    INDICATION: Left facial droop and left eyelid ptosis. Frequent falls.   TECHNIQUE: MRI of the brain was performed without and with contrast. 7 mL of Gadavist was administered intravenously. 0 mL was discarded.   COMPARISON: None.   FINDINGS:  There is no evidence of acute infarct, acute intracranial hemorrhage, mass effect or hydrocephalus. No extra-axial collection. Basal cisterns are patent.   No brain parenchymal signal abnormality.   There is no abnormal intracranial enhancement.   Ventricles and sulci are age-appropriate in size.   Major intracranial flow-voids are preserved.   Bilateral cataract surgery. The orbits, sellar and suprasellar structures, and craniocervical junction are otherwise unremarkable. Mucosal thickening involving the left maxillary sinus. Visu alized paranasal sinuses and mastoid air cells are otherwise clear.    IMPRESSION:  Unremarkable study.  ***Electronically Signed *** ----------------------------------------------- Ananth Masters MD              12/26/19 1415  Dictated on 12/24/19 1455   Report cc:  Angel Leary MD;      Ordered by: ANGEL LEARY       Collected/Examined: 24Dec2019 01:18PM        Verified by: ANGEL LEARY 26Dec2019 02:37PM         Result Communication: No patient communication needed at this time; Stage: Final         Performed at: 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  No active denervation in muscles at present.

## 2024-08-06 NOTE — ASSESSMENT
[FreeTextEntry1] : apply lidocaine patches to affected areas will restart tramadol-acetaminophen 37.5-325mg 1 tablet twice daily - can slowly increase depending on symptom management She has 2 pills left of oxycodone from s/p hernia surgery I have advised pt do NOT start tramadol while still taking oxycodone tizanidine 2mg PRN for muscle spasm.  Monitor for sedating side effects. 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  No active denervation in muscles at present.  Encouraged to restart physical therapy to help with strength, gait stability and balance - deferred at this time Encouraged to walk daily, wear proper fitting shoes, regular foot exams   She already has follow-up scheduled with MD Leary

## 2024-08-08 ENCOUNTER — TRANSCRIPTION ENCOUNTER (OUTPATIENT)
Age: 79
End: 2024-08-08

## 2024-08-12 ENCOUNTER — APPOINTMENT (OUTPATIENT)
Dept: PAIN MANAGEMENT | Facility: CLINIC | Age: 79
End: 2024-08-12
Payer: MEDICARE

## 2024-08-12 VITALS
SYSTOLIC BLOOD PRESSURE: 141 MMHG | HEIGHT: 60 IN | DIASTOLIC BLOOD PRESSURE: 82 MMHG | BODY MASS INDEX: 28.27 KG/M2 | WEIGHT: 144 LBS

## 2024-08-12 DIAGNOSIS — M54.16 RADICULOPATHY, LUMBAR REGION: ICD-10-CM

## 2024-08-12 DIAGNOSIS — G89.4 CHRONIC PAIN SYNDROME: ICD-10-CM

## 2024-08-12 DIAGNOSIS — R20.0 ANESTHESIA OF SKIN: ICD-10-CM

## 2024-08-12 DIAGNOSIS — F31.9 BIPOLAR DISORDER, UNSPECIFIED: ICD-10-CM

## 2024-08-12 PROCEDURE — 99214 OFFICE O/P EST MOD 30 MIN: CPT

## 2024-08-12 PROCEDURE — G2211 COMPLEX E/M VISIT ADD ON: CPT

## 2024-08-13 ENCOUNTER — APPOINTMENT (OUTPATIENT)
Dept: SURGERY | Facility: CLINIC | Age: 79
End: 2024-08-13

## 2024-08-13 ENCOUNTER — NON-APPOINTMENT (OUTPATIENT)
Age: 79
End: 2024-08-13

## 2024-08-13 NOTE — PHYSICAL EXAM
[de-identified] : Constitutional: Well-developed, in no acute distress\par  \par  Musculoskeletal:\par  Lumbar Spine:   Gait: Antalgic			\par  \par  		Sensation: normal and equal in bilateral lower extremities\par  \par  Extremity: mild edema noted, red marks on feet bilaterally\par  Neurological: Memory normal, AAO x 3, Cranial nerves II - XII grossly normal\par  Psychiatric: Appropriate mood and affect, oriented to time, place, person, and situation\par  \par  \par

## 2024-08-13 NOTE — PHYSICAL EXAM
[de-identified] : Constitutional: Well-developed, in no acute distress\par  \par  Musculoskeletal:\par  Lumbar Spine:   Gait: Antalgic			\par  \par  		Sensation: normal and equal in bilateral lower extremities\par  \par  Extremity: mild edema noted, red marks on feet bilaterally\par  Neurological: Memory normal, AAO x 3, Cranial nerves II - XII grossly normal\par  Psychiatric: Appropriate mood and affect, oriented to time, place, person, and situation\par  \par  \par

## 2024-08-13 NOTE — HISTORY OF PRESENT ILLNESS
[___ yrs] : [unfilled] year(s) ago [Constant] : constant [9] : a maximum pain level of 9/10 [Sharp] : sharp [Aching] : aching [Throbbing] : throbbing [Burning] : burning [Electric] : electric [Medications] : medications [8] : 3. What number best describes how, during the past week, pain has interfered with your general activity? 8/10 pain [FreeTextEntry1] : Interval History: Pt returns for follow-up today. She is now s/p hernia surgery. Had excellent relief from her last JAQUI and wishes for new JAQUI, however, wants to clear it with her surgeon first. Quality of life is impaired. There has been a severe exacerbation of the patient's chronic pain. Reports that her peripheral neuropathy is worsening in her arms, legs, hands, and feet.   On previous visit: Patient returns for follow-up today. Since her last visit she fell and broke her ribs on the right side on April 7, 2023. Her pain is severe and not improving. She continues to have back pain and severe pain down her legs. Left leg is worse then the right leg. She has significant numbness. She wishes to defer SCS therapy. No relief w/ medications. Quality of life is impaired. There has been a severe exacerbation of the patient's chronic pain. She did have significant relief from her previous JAQUI.   HPI: 76 yof presents today for consultation with bilateral foot and leg pain. She has numbness and pain in her feet. She is endorsing intense muscle spasms and cramps.She has been losing balance and falling more frequently. She uses tramadol 50 mg prn. She had side effects w/ gabapentin and no relief w/ Lyrica. Her Lyme disease has resolved. Left leg is worse then the right. Left leg goes up to the upper leg. Pain stops at the right knee level. Also has pain in her left wrist. Dr. Leary manages her peripheral neuropathy. She has seen Dr. Lazcano for orthopedic evaluation who referred her here.  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

## 2024-08-13 NOTE — PHYSICAL EXAM
[de-identified] : Constitutional: Well-developed, in no acute distress\par  \par  Musculoskeletal:\par  Lumbar Spine:   Gait: Antalgic			\par  \par  		Sensation: normal and equal in bilateral lower extremities\par  \par  Extremity: mild edema noted, red marks on feet bilaterally\par  Neurological: Memory normal, AAO x 3, Cranial nerves II - XII grossly normal\par  Psychiatric: Appropriate mood and affect, oriented to time, place, person, and situation\par  \par  \par

## 2024-08-13 NOTE — ASSESSMENT
[FreeTextEntry1] : 79 yof presents today for follow-up with severe peripheral neuropathy which is worsening. Reports she has pain after recent hernia surgery but is healing well. Reports last JAQUI was beneficial.  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   I have personally reviewed the patient's MRI in detail and discussed it with them which is significant for multiple levels of stenosis.  The patient has failed to have relief with medication management. The patient has failed to have relief with more then six weeks of physical therapy within the last three months. Given the patients failure to improve with all other conservative measures, recommend L5-S1 interlaminar epidural steroid injection under fluoroscopic guidance. The patient will follow-up with me in my office two weeks following intervention. Will pursue when cleared by surgery.   I have discussed in detail with the patient that an interventional spine procedure is associated with potential risks. The procedure may include an injection of steroid and potentially other medications (local anesthetic and normal saline) into the epidural space or surrounding tissue of the spine. There are significant risks of this procedure which include and are not limited to infection, bleeding, worsening pain, dural puncture leading to post-dural puncture headache, nerve damage, spinal cord injury, paralysis, stroke, and death. There is a chance that the procedure does not improve their pain. There are risks associated with the steroid being absorbed into the body systemically. These include dysphoria, difficulty sleeping, mood swings, and personality changes. Pre-menopausal women may notice a regularity his in her menstrual cycle for 2-3 months following the injection. Steroids can specifically affect patients with hypertension, diabetes, and peptic ulcers. The procedure may cause a temporary increase in blood pressure and blood glucose, and may adversely affect a peptic ulcer. Other, more rare complications, including avascular necrosis of the joints, glaucoma, and osteoporosis. I have discussed the risks of the procedure at length with the patient, and the potential benefits of pain relief. I have offered alternatives to the procedure. All questions were answered. The patient expressed understanding and wishes to proceed with the procedure.  Lidoderm patches.  Physical therapy prescribed - goal will be to increase ROM, strengthening, postural training, other modalities ad letty which may include massage and stim. Goals of therapy discussed with the patient in detail and will be discussed with physical therapist. Patient will follow-up following course of physical therapy to monitor progress and adjust therapy as needed.  Acetaminophen 1,000 mg q8h prn for moderate pain. Risks, benefits, and alternatives of acetaminophen discussed with patient.  Diet and nutritional strategies discussed which may improve patients pain and will improve overall health.  Based on the current evaluation and management, I will provide ongoing, longitudinal care for the complex painful condition described above. Patient is encouraged to reach out with any questions and/or concerns regarding their condition and care.

## 2024-09-10 ENCOUNTER — RX RENEWAL (OUTPATIENT)
Age: 79
End: 2024-09-10

## 2024-09-24 ENCOUNTER — APPOINTMENT (OUTPATIENT)
Dept: SURGERY | Facility: CLINIC | Age: 79
End: 2024-09-24
Payer: MEDICARE

## 2024-09-24 VITALS
OXYGEN SATURATION: 98 % | SYSTOLIC BLOOD PRESSURE: 128 MMHG | HEART RATE: 88 BPM | TEMPERATURE: 98.1 F | DIASTOLIC BLOOD PRESSURE: 60 MMHG

## 2024-09-24 PROCEDURE — 99213 OFFICE O/P EST LOW 20 MIN: CPT | Mod: 24

## 2024-09-24 NOTE — ASSESSMENT
[FreeTextEntry1] : Patient presents today as instructed she has no complaints regarding the left inguinal hernia repair.  She is very happy with her results she has no pain in the area.  Her biggest issue is her back and she has been dealing with Dr. Clarke from pain management.  Physical examination she has a very well-healed scar she still has a nice healing ridge.  There is no evidence of recurrence.  Impression/plan status post open left inguinal hernia repair patient doing well surgically no acute findings patient reassured and she has resumed all levels of activity.  Patient will now see me on appearing basis.

## 2024-09-26 ENCOUNTER — APPOINTMENT (OUTPATIENT)
Dept: NEUROLOGY | Facility: CLINIC | Age: 79
End: 2024-09-26

## 2024-10-10 ENCOUNTER — APPOINTMENT (OUTPATIENT)
Dept: INTERNAL MEDICINE | Facility: CLINIC | Age: 79
End: 2024-10-10

## 2024-12-02 ENCOUNTER — APPOINTMENT (OUTPATIENT)
Dept: PAIN MANAGEMENT | Facility: CLINIC | Age: 79
End: 2024-12-02

## 2024-12-16 ENCOUNTER — APPOINTMENT (OUTPATIENT)
Dept: PAIN MANAGEMENT | Facility: CLINIC | Age: 79
End: 2024-12-16
Payer: MEDICARE

## 2024-12-16 VITALS
HEART RATE: 91 BPM | RESPIRATION RATE: 16 BRPM | OXYGEN SATURATION: 97 % | SYSTOLIC BLOOD PRESSURE: 168 MMHG | DIASTOLIC BLOOD PRESSURE: 62 MMHG

## 2024-12-16 DIAGNOSIS — M54.16 RADICULOPATHY, LUMBAR REGION: ICD-10-CM

## 2024-12-16 PROCEDURE — 62323 NJX INTERLAMINAR LMBR/SAC: CPT

## 2024-12-31 RX ORDER — LINACLOTIDE 145 UG/1
145 CAPSULE, GELATIN COATED ORAL DAILY
Qty: 90 | Refills: 3 | Status: ACTIVE | COMMUNITY
Start: 2024-12-31 | End: 1900-01-01

## 2025-01-21 ENCOUNTER — APPOINTMENT (OUTPATIENT)
Dept: NEUROLOGY | Facility: CLINIC | Age: 80
End: 2025-01-21

## 2025-02-06 ENCOUNTER — APPOINTMENT (OUTPATIENT)
Dept: GASTROENTEROLOGY | Facility: CLINIC | Age: 80
End: 2025-02-06

## 2025-02-12 RX ORDER — CELECOXIB 100 MG/1
100 CAPSULE ORAL
Qty: 14 | Refills: 0 | Status: ACTIVE | COMMUNITY
Start: 2025-02-12 | End: 1900-01-01

## 2025-02-19 ENCOUNTER — APPOINTMENT (OUTPATIENT)
Dept: PAIN MANAGEMENT | Facility: CLINIC | Age: 80
End: 2025-02-19
Payer: MEDICARE

## 2025-02-19 DIAGNOSIS — M79.10 MYALGIA, UNSPECIFIED SITE: ICD-10-CM

## 2025-02-19 DIAGNOSIS — M54.16 RADICULOPATHY, LUMBAR REGION: ICD-10-CM

## 2025-02-19 DIAGNOSIS — G89.4 CHRONIC PAIN SYNDROME: ICD-10-CM

## 2025-02-19 PROCEDURE — 99214 OFFICE O/P EST MOD 30 MIN: CPT | Mod: 2W

## 2025-02-19 PROCEDURE — G2211 COMPLEX E/M VISIT ADD ON: CPT | Mod: 2W

## 2025-02-20 PROBLEM — M79.10 MYALGIA: Status: ACTIVE | Noted: 2025-02-20

## 2025-02-20 PROBLEM — G89.4 CHRONIC PAIN SYNDROME: Status: RESOLVED | Noted: 2021-06-30 | Resolved: 2025-02-20

## 2025-02-20 PROBLEM — G89.4 CHRONIC PAIN SYNDROME: Status: ACTIVE | Noted: 2025-02-20

## 2025-03-13 ENCOUNTER — NON-APPOINTMENT (OUTPATIENT)
Age: 80
End: 2025-03-13

## 2025-04-07 ENCOUNTER — APPOINTMENT (OUTPATIENT)
Dept: PAIN MANAGEMENT | Facility: CLINIC | Age: 80
End: 2025-04-07

## 2025-05-01 ENCOUNTER — APPOINTMENT (OUTPATIENT)
Dept: GASTROENTEROLOGY | Facility: CLINIC | Age: 80
End: 2025-05-01
Payer: MEDICARE

## 2025-05-01 DIAGNOSIS — D50.0 IRON DEFICIENCY ANEMIA SECONDARY TO BLOOD LOSS (CHRONIC): ICD-10-CM

## 2025-05-01 DIAGNOSIS — K59.09 OTHER CONSTIPATION: ICD-10-CM

## 2025-05-01 DIAGNOSIS — F31.9 BIPOLAR DISORDER, UNSPECIFIED: ICD-10-CM

## 2025-05-01 DIAGNOSIS — C91.10 CHRONIC LYMPHOCYTIC LEUKEMIA OF B-CELL TYPE NOT HAVING ACHIEVED REMISSION: ICD-10-CM

## 2025-05-01 DIAGNOSIS — K22.10 ULCER OF ESOPHAGUS W/OUT BLEEDING: ICD-10-CM

## 2025-05-01 DIAGNOSIS — Z98.890 OTHER SPECIFIED POSTPROCEDURAL STATES: ICD-10-CM

## 2025-05-01 PROCEDURE — 99203 OFFICE O/P NEW LOW 30 MIN: CPT | Mod: 2W

## 2025-05-01 RX ORDER — LACTULOSE 10 G/15ML
10 SOLUTION ORAL
Qty: 450 | Refills: 5 | Status: ACTIVE | COMMUNITY
Start: 2025-05-01 | End: 1900-01-01

## 2025-05-16 DIAGNOSIS — Z12.11 ENCOUNTER FOR SCREENING FOR MALIGNANT NEOPLASM OF COLON: ICD-10-CM

## 2025-05-16 RX ORDER — LACTULOSE 10 G/15ML
10 SOLUTION ORAL DAILY
Qty: 450 | Refills: 5 | Status: ACTIVE | COMMUNITY
Start: 2025-05-16 | End: 1900-01-01

## 2025-05-16 RX ORDER — SODIUM PICOSULFATE, MAGNESIUM OXIDE, AND ANHYDROUS CITRIC ACID 12; 3.5; 1 G/175ML; G/175ML; MG/175ML
10-3.5-12 MG-GM LIQUID ORAL
Qty: 2 | Refills: 1 | Status: ACTIVE | COMMUNITY
Start: 2025-05-16 | End: 1900-01-01

## 2025-05-27 RX ORDER — VONOPRAZAN FUMARATE 26.72 MG/1
20 TABLET ORAL
Qty: 45 | Refills: 0 | Status: ACTIVE | COMMUNITY
Start: 2025-05-27 | End: 1900-01-01

## 2025-05-27 RX ORDER — LANSOPRAZOLE 30 MG/1
30 CAPSULE, DELAYED RELEASE ORAL TWICE DAILY
Qty: 180 | Refills: 3 | Status: ACTIVE | COMMUNITY
Start: 2025-05-27 | End: 1900-01-01

## 2025-06-02 ENCOUNTER — APPOINTMENT (OUTPATIENT)
Dept: GASTROENTEROLOGY | Facility: HOSPITAL | Age: 80
End: 2025-06-02

## 2025-06-02 ENCOUNTER — RESULT REVIEW (OUTPATIENT)
Age: 80
End: 2025-06-02

## 2025-06-02 ENCOUNTER — TRANSCRIPTION ENCOUNTER (OUTPATIENT)
Age: 80
End: 2025-06-02

## 2025-06-03 ENCOUNTER — APPOINTMENT (OUTPATIENT)
Dept: GASTROENTEROLOGY | Facility: HOSPITAL | Age: 80
End: 2025-06-03

## 2025-06-04 ENCOUNTER — APPOINTMENT (OUTPATIENT)
Dept: GASTROENTEROLOGY | Facility: HOSPITAL | Age: 80
End: 2025-06-04

## 2025-06-04 ENCOUNTER — TRANSCRIPTION ENCOUNTER (OUTPATIENT)
Age: 80
End: 2025-06-04

## 2025-06-07 RX ORDER — TETRACYCLINE HYDROCHLORIDE 500 MG/1
500 TABLET, FILM COATED ORAL
Qty: 42 | Refills: 1 | Status: ACTIVE | COMMUNITY
Start: 2025-06-07 | End: 1900-01-01

## 2025-06-07 RX ORDER — LANSOPRAZOLE 30 MG/1
30 CAPSULE, DELAYED RELEASE ORAL
Qty: 90 | Refills: 0 | Status: ACTIVE | COMMUNITY
Start: 2025-06-07 | End: 1900-01-01

## 2025-06-07 RX ORDER — METRONIDAZOLE 500 MG/1
500 TABLET ORAL
Qty: 42 | Refills: 1 | Status: ACTIVE | COMMUNITY
Start: 2025-06-07 | End: 1900-01-01

## 2025-06-12 RX ORDER — TETRACYCLINE HYDROCHLORIDE 500 MG/1
500 CAPSULE ORAL
Qty: 56 | Refills: 1 | Status: ACTIVE | COMMUNITY
Start: 2025-06-12 | End: 1900-01-01

## 2025-06-13 RX ORDER — DOXYCYCLINE HYCLATE 100 MG/1
100 TABLET, COATED ORAL
Qty: 28 | Refills: 0 | Status: ACTIVE | COMMUNITY
Start: 2025-06-13 | End: 1900-01-01

## 2025-07-21 ENCOUNTER — RX RENEWAL (OUTPATIENT)
Age: 80
End: 2025-07-21

## 2025-07-22 ENCOUNTER — RX RENEWAL (OUTPATIENT)
Age: 80
End: 2025-07-22

## 2025-07-24 ENCOUNTER — APPOINTMENT (OUTPATIENT)
Dept: CARDIOLOGY | Facility: CLINIC | Age: 80
End: 2025-07-24
Payer: MEDICARE

## 2025-07-24 VITALS
OXYGEN SATURATION: 97 % | BODY MASS INDEX: 27.88 KG/M2 | HEART RATE: 79 BPM | SYSTOLIC BLOOD PRESSURE: 116 MMHG | DIASTOLIC BLOOD PRESSURE: 60 MMHG | HEIGHT: 60 IN | WEIGHT: 142 LBS

## 2025-07-24 DIAGNOSIS — M25.472 EFFUSION, RIGHT ANKLE: ICD-10-CM

## 2025-07-24 DIAGNOSIS — M25.471 EFFUSION, RIGHT ANKLE: ICD-10-CM

## 2025-07-24 DIAGNOSIS — E78.49 OTHER HYPERLIPIDEMIA: ICD-10-CM

## 2025-07-24 DIAGNOSIS — I25.10 ATHEROSCLEROTIC HEART DISEASE OF NATIVE CORONARY ARTERY W/OUT ANGINA PECTORIS: ICD-10-CM

## 2025-07-24 PROCEDURE — 36415 COLL VENOUS BLD VENIPUNCTURE: CPT

## 2025-07-24 PROCEDURE — 93000 ELECTROCARDIOGRAM COMPLETE: CPT

## 2025-07-24 PROCEDURE — 99214 OFFICE O/P EST MOD 30 MIN: CPT

## 2025-07-24 RX ORDER — QUETIAPINE FUMARATE 50 MG/1
50 TABLET ORAL
Refills: 0 | Status: ACTIVE | COMMUNITY
Start: 2025-07-24

## 2025-07-24 RX ORDER — QUETIAPINE FUMARATE 25 MG/1
25 TABLET ORAL DAILY
Refills: 0 | Status: ACTIVE | COMMUNITY
Start: 2025-07-24

## 2025-07-25 LAB
ANION GAP SERPL CALC-SCNC: 12 MMOL/L
BUN SERPL-MCNC: 19 MG/DL
CALCIUM SERPL-MCNC: 9.3 MG/DL
CHLORIDE SERPL-SCNC: 108 MMOL/L
CO2 SERPL-SCNC: 21 MMOL/L
CREAT SERPL-MCNC: 0.67 MG/DL
EGFRCR SERPLBLD CKD-EPI 2021: 88 ML/MIN/1.73M2
GLUCOSE SERPL-MCNC: 109 MG/DL
NT-PROBNP SERPL-MCNC: 129 PG/ML
POTASSIUM SERPL-SCNC: 4.4 MMOL/L
SODIUM SERPL-SCNC: 141 MMOL/L

## 2025-07-29 DIAGNOSIS — R60.0 LOCALIZED EDEMA: ICD-10-CM

## 2025-07-30 ENCOUNTER — RESULT REVIEW (OUTPATIENT)
Age: 80
End: 2025-07-30